# Patient Record
Sex: MALE | Race: WHITE | HISPANIC OR LATINO | Employment: FULL TIME | URBAN - METROPOLITAN AREA
[De-identification: names, ages, dates, MRNs, and addresses within clinical notes are randomized per-mention and may not be internally consistent; named-entity substitution may affect disease eponyms.]

---

## 2018-05-22 ENCOUNTER — OFFICE VISIT (OUTPATIENT)
Dept: URGENT CARE | Facility: CLINIC | Age: 53
End: 2018-05-22
Payer: COMMERCIAL

## 2018-05-22 VITALS
BODY MASS INDEX: 34.1 KG/M2 | SYSTOLIC BLOOD PRESSURE: 118 MMHG | RESPIRATION RATE: 16 BRPM | HEIGHT: 68 IN | TEMPERATURE: 98 F | DIASTOLIC BLOOD PRESSURE: 78 MMHG | HEART RATE: 76 BPM | WEIGHT: 225 LBS | OXYGEN SATURATION: 98 %

## 2018-05-22 DIAGNOSIS — L03.031 PARONYCHIA OF GREAT TOE OF RIGHT FOOT: ICD-10-CM

## 2018-05-22 DIAGNOSIS — L60.0 INGROWN RIGHT BIG TOENAIL: ICD-10-CM

## 2018-05-22 PROCEDURE — 99204 OFFICE O/P NEW MOD 45 MIN: CPT | Performed by: NURSE PRACTITIONER

## 2018-05-22 RX ORDER — CEPHALEXIN 500 MG/1
500 CAPSULE ORAL 4 TIMES DAILY
Qty: 28 CAP | Refills: 0 | Status: SHIPPED | OUTPATIENT
Start: 2018-05-22 | End: 2018-05-29

## 2018-05-22 ASSESSMENT — ENCOUNTER SYMPTOMS: FEVER: 0

## 2018-05-23 NOTE — PROGRESS NOTES
"Subjective:      Iain Costa is a 53 y.o. male who presents with Ingrown Toenail ((R) big toe x 1 week)            This is a new problem. Pt reports right great toe nail has been infected for over one week. + pain, swelling and drainage from great toe nail. He has been trying warm soaks and a homeopathic topical treatment without any improvement. He reports it hurts to stand on his foot all day and work. He denies fever. He attempted to get a pedicure last week but it was too painful.        Review of Systems   Constitutional: Negative for fever.   Musculoskeletal:        Right great toe infection   All other systems reviewed and are negative.    History reviewed. No pertinent past medical history. History reviewed. No pertinent surgical history.   Social History     Social History   • Marital status:      Spouse name: N/A   • Number of children: N/A   • Years of education: N/A     Occupational History   • Not on file.     Social History Main Topics   • Smoking status: Never Smoker   • Smokeless tobacco: Never Used   • Alcohol use No   • Drug use: No   • Sexual activity: Not on file     Other Topics Concern   • Not on file     Social History Narrative   • No narrative on file          Objective:     /78   Pulse 76   Temp 36.7 °C (98 °F)   Resp 16   Ht 1.727 m (5' 8\")   Wt 102.1 kg (225 lb)   SpO2 98%   BMI 34.21 kg/m²      Physical Exam   Constitutional: He is oriented to person, place, and time. Vital signs are normal. He appears well-developed and well-nourished.   HENT:   Head: Normocephalic and atraumatic.   Eyes: EOM are normal. Pupils are equal, round, and reactive to light.   Neck: Normal range of motion.   Cardiovascular: Normal rate and regular rhythm.    Pulmonary/Chest: Effort normal.   Musculoskeletal: Normal range of motion.        Right foot: There is tenderness and swelling.        Feet:    Neurological: He is alert and oriented to person, place, and time.   Skin: Skin is warm " and dry. Capillary refill takes less than 2 seconds.   Psychiatric: He has a normal mood and affect. His speech is normal and behavior is normal. Thought content normal.   Vitals reviewed.              Assessment/Plan:     1. Paronychia of great toe of right foot  - cephALEXin (KEFLEX) 500 MG Cap; Take 1 Cap by mouth 4 times a day for 7 days.  Dispense: 28 Cap; Refill: 0    2. Ingrown right big toenail  - cephALEXin (KEFLEX) 500 MG Cap; Take 1 Cap by mouth 4 times a day for 7 days.  Dispense: 28 Cap; Refill: 0    Advised pt and family that attempting to remove toenail today would prove to be very painful and likely unsuccessful due to the swelling and infection, they understand  Warm Epsom salt soaks TID  Alternate tylenol and ibuprofen PRN pain  When toe begins to heal and infection clears up, he is then instructed to have a pedicure to trim overgrown toe nail, he and his wife understand  Strict ER precautions for new onset fever, worsening pain and swelling  Supportive care, differential diagnoses, and indications for immediate follow-up discussed with patient.    Pathogenesis of diagnosis discussed including typical length and natural progression.      Instructed to return to  or nearest emergency department if symptoms fail to improve, for any change in condition, further concerns, or new concerning symptoms.  Patient states understanding of the plan of care and discharge instructions.

## 2020-01-01 ENCOUNTER — APPOINTMENT (OUTPATIENT)
Dept: RADIOLOGY | Facility: MEDICAL CENTER | Age: 55
DRG: 870 | End: 2020-01-01
Attending: INTERNAL MEDICINE
Payer: COMMERCIAL

## 2020-01-01 ENCOUNTER — OFFICE VISIT (OUTPATIENT)
Dept: URGENT CARE | Facility: CLINIC | Age: 55
End: 2020-01-01
Payer: COMMERCIAL

## 2020-01-01 ENCOUNTER — HOSPITAL ENCOUNTER (OUTPATIENT)
Facility: MEDICAL CENTER | Age: 55
End: 2020-11-06
Attending: FAMILY MEDICINE
Payer: COMMERCIAL

## 2020-01-01 ENCOUNTER — APPOINTMENT (OUTPATIENT)
Dept: RADIOLOGY | Facility: MEDICAL CENTER | Age: 55
DRG: 870 | End: 2020-01-01
Attending: EMERGENCY MEDICINE
Payer: COMMERCIAL

## 2020-01-01 ENCOUNTER — HOSPITAL ENCOUNTER (OUTPATIENT)
Dept: RADIOLOGY | Facility: MEDICAL CENTER | Age: 55
End: 2020-11-18
Attending: INTERNAL MEDICINE
Payer: COMMERCIAL

## 2020-01-01 ENCOUNTER — APPOINTMENT (OUTPATIENT)
Dept: RADIOLOGY | Facility: IMAGING CENTER | Age: 55
End: 2020-01-01
Attending: FAMILY MEDICINE
Payer: COMMERCIAL

## 2020-01-01 ENCOUNTER — HOSPITAL ENCOUNTER (INPATIENT)
Facility: MEDICAL CENTER | Age: 55
LOS: 21 days | DRG: 870 | End: 2020-12-03
Attending: EMERGENCY MEDICINE | Admitting: INTERNAL MEDICINE
Payer: COMMERCIAL

## 2020-01-01 VITALS
OXYGEN SATURATION: 93 % | TEMPERATURE: 98.8 F | HEIGHT: 68 IN | RESPIRATION RATE: 20 BRPM | DIASTOLIC BLOOD PRESSURE: 78 MMHG | BODY MASS INDEX: 34.56 KG/M2 | HEART RATE: 90 BPM | WEIGHT: 228 LBS | SYSTOLIC BLOOD PRESSURE: 120 MMHG

## 2020-01-01 VITALS
SYSTOLIC BLOOD PRESSURE: 82 MMHG | BODY MASS INDEX: 35.68 KG/M2 | WEIGHT: 235.45 LBS | DIASTOLIC BLOOD PRESSURE: 44 MMHG | HEIGHT: 68 IN | TEMPERATURE: 95.4 F

## 2020-01-01 DIAGNOSIS — U07.1 ACUTE RESPIRATORY DISTRESS SYNDROME (ARDS) DUE TO COVID-19 VIRUS (HCC): ICD-10-CM

## 2020-01-01 DIAGNOSIS — J18.9 MULTIFOCAL PNEUMONIA: ICD-10-CM

## 2020-01-01 DIAGNOSIS — R05.9 COUGH: ICD-10-CM

## 2020-01-01 DIAGNOSIS — U07.1 COVID-19 VIRUS INFECTION: ICD-10-CM

## 2020-01-01 DIAGNOSIS — R06.02 SHORTNESS OF BREATH: ICD-10-CM

## 2020-01-01 DIAGNOSIS — J96.01 ACUTE RESPIRATORY FAILURE WITH HYPOXIA (HCC): ICD-10-CM

## 2020-01-01 DIAGNOSIS — J80 ACUTE RESPIRATORY DISTRESS SYNDROME (ARDS) DUE TO COVID-19 VIRUS (HCC): ICD-10-CM

## 2020-01-01 DIAGNOSIS — M79.10 MYALGIA: ICD-10-CM

## 2020-01-01 LAB
ACTION RANGE TRIGGERED IACRT: NO
ACTION RANGE TRIGGERED IACRT: YES
ALBUMIN SERPL BCP-MCNC: 2.1 G/DL (ref 3.2–4.9)
ALBUMIN SERPL BCP-MCNC: 2.2 G/DL (ref 3.2–4.9)
ALBUMIN SERPL BCP-MCNC: 2.2 G/DL (ref 3.2–4.9)
ALBUMIN SERPL BCP-MCNC: 2.3 G/DL (ref 3.2–4.9)
ALBUMIN SERPL BCP-MCNC: 2.4 G/DL (ref 3.2–4.9)
ALBUMIN SERPL BCP-MCNC: 2.4 G/DL (ref 3.2–4.9)
ALBUMIN SERPL BCP-MCNC: 2.5 G/DL (ref 3.2–4.9)
ALBUMIN SERPL BCP-MCNC: 2.7 G/DL (ref 3.2–4.9)
ALBUMIN SERPL BCP-MCNC: 2.8 G/DL (ref 3.2–4.9)
ALBUMIN SERPL BCP-MCNC: 3 G/DL (ref 3.2–4.9)
ALBUMIN SERPL BCP-MCNC: 3.8 G/DL (ref 3.2–4.9)
ALBUMIN/GLOB SERPL: 0.6 G/DL
ALBUMIN/GLOB SERPL: 0.7 G/DL
ALBUMIN/GLOB SERPL: 0.8 G/DL
ALBUMIN/GLOB SERPL: 1.2 G/DL
ALP SERPL-CCNC: 56 U/L (ref 30–99)
ALP SERPL-CCNC: 60 U/L (ref 30–99)
ALP SERPL-CCNC: 61 U/L (ref 30–99)
ALP SERPL-CCNC: 61 U/L (ref 30–99)
ALP SERPL-CCNC: 64 U/L (ref 30–99)
ALP SERPL-CCNC: 65 U/L (ref 30–99)
ALP SERPL-CCNC: 70 U/L (ref 30–99)
ALP SERPL-CCNC: 72 U/L (ref 30–99)
ALP SERPL-CCNC: 74 U/L (ref 30–99)
ALP SERPL-CCNC: 77 U/L (ref 30–99)
ALP SERPL-CCNC: 80 U/L (ref 30–99)
ALP SERPL-CCNC: 86 U/L (ref 30–99)
ALP SERPL-CCNC: 98 U/L (ref 30–99)
ALT SERPL-CCNC: 102 U/L (ref 2–50)
ALT SERPL-CCNC: 38 U/L (ref 2–50)
ALT SERPL-CCNC: 40 U/L (ref 2–50)
ALT SERPL-CCNC: 46 U/L (ref 2–50)
ALT SERPL-CCNC: 52 U/L (ref 2–50)
ALT SERPL-CCNC: 53 U/L (ref 2–50)
ALT SERPL-CCNC: 55 U/L (ref 2–50)
ALT SERPL-CCNC: 65 U/L (ref 2–50)
ALT SERPL-CCNC: 65 U/L (ref 2–50)
ALT SERPL-CCNC: 71 U/L (ref 2–50)
ALT SERPL-CCNC: 78 U/L (ref 2–50)
ALT SERPL-CCNC: 81 U/L (ref 2–50)
ALT SERPL-CCNC: 88 U/L (ref 2–50)
ANION GAP SERPL CALC-SCNC: 10 MMOL/L (ref 7–16)
ANION GAP SERPL CALC-SCNC: 11 MMOL/L (ref 7–16)
ANION GAP SERPL CALC-SCNC: 11 MMOL/L (ref 7–16)
ANION GAP SERPL CALC-SCNC: 12 MMOL/L (ref 7–16)
ANION GAP SERPL CALC-SCNC: 16 MMOL/L (ref 7–16)
ANION GAP SERPL CALC-SCNC: 2 MMOL/L (ref 7–16)
ANION GAP SERPL CALC-SCNC: 3 MMOL/L (ref 7–16)
ANION GAP SERPL CALC-SCNC: 3 MMOL/L (ref 7–16)
ANION GAP SERPL CALC-SCNC: 4 MMOL/L (ref 7–16)
ANION GAP SERPL CALC-SCNC: 4 MMOL/L (ref 7–16)
ANION GAP SERPL CALC-SCNC: 5 MMOL/L (ref 7–16)
ANION GAP SERPL CALC-SCNC: 6 MMOL/L (ref 7–16)
ANION GAP SERPL CALC-SCNC: 7 MMOL/L (ref 7–16)
ANION GAP SERPL CALC-SCNC: 8 MMOL/L (ref 7–16)
ANISOCYTOSIS BLD QL SMEAR: ABNORMAL
ANISOCYTOSIS BLD QL SMEAR: ABNORMAL
APPEARANCE UR: CLEAR
APPEARANCE UR: CLEAR
AST SERPL-CCNC: 35 U/L (ref 12–45)
AST SERPL-CCNC: 36 U/L (ref 12–45)
AST SERPL-CCNC: 38 U/L (ref 12–45)
AST SERPL-CCNC: 44 U/L (ref 12–45)
AST SERPL-CCNC: 46 U/L (ref 12–45)
AST SERPL-CCNC: 52 U/L (ref 12–45)
AST SERPL-CCNC: 55 U/L (ref 12–45)
AST SERPL-CCNC: 61 U/L (ref 12–45)
AST SERPL-CCNC: 65 U/L (ref 12–45)
AST SERPL-CCNC: 72 U/L (ref 12–45)
AST SERPL-CCNC: 85 U/L (ref 12–45)
BACTERIA #/AREA URNS HPF: ABNORMAL /HPF
BACTERIA #/AREA URNS HPF: NEGATIVE /HPF
BACTERIA BLD CULT: ABNORMAL
BACTERIA BLD CULT: ABNORMAL
BACTERIA BLD CULT: NORMAL
BACTERIA UR CULT: NORMAL
BASE EXCESS BLDA CALC-SCNC: -3 MMOL/L (ref -4–3)
BASE EXCESS BLDA CALC-SCNC: -3 MMOL/L (ref -4–3)
BASE EXCESS BLDA CALC-SCNC: -4 MMOL/L (ref -4–3)
BASE EXCESS BLDA CALC-SCNC: 1 MMOL/L (ref -4–3)
BASE EXCESS BLDA CALC-SCNC: 10 MMOL/L (ref -4–3)
BASE EXCESS BLDA CALC-SCNC: 10 MMOL/L (ref -4–3)
BASE EXCESS BLDA CALC-SCNC: 11 MMOL/L (ref -4–3)
BASE EXCESS BLDA CALC-SCNC: 12 MMOL/L (ref -4–3)
BASE EXCESS BLDA CALC-SCNC: 12 MMOL/L (ref -4–3)
BASE EXCESS BLDA CALC-SCNC: 13 MMOL/L (ref -4–3)
BASE EXCESS BLDA CALC-SCNC: 13 MMOL/L (ref -4–3)
BASE EXCESS BLDA CALC-SCNC: 4 MMOL/L (ref -4–3)
BASE EXCESS BLDA CALC-SCNC: 5 MMOL/L (ref -4–3)
BASE EXCESS BLDA CALC-SCNC: 7 MMOL/L (ref -4–3)
BASE EXCESS BLDA CALC-SCNC: 8 MMOL/L (ref -4–3)
BASE EXCESS BLDA CALC-SCNC: 9 MMOL/L (ref -4–3)
BASO STIPL BLD QL SMEAR: NORMAL
BASOPHILS # BLD AUTO: 0 % (ref 0–1.8)
BASOPHILS # BLD AUTO: 0.1 % (ref 0–1.8)
BASOPHILS # BLD AUTO: 0.2 % (ref 0–1.8)
BASOPHILS # BLD AUTO: 0.3 % (ref 0–1.8)
BASOPHILS # BLD AUTO: 0.3 % (ref 0–1.8)
BASOPHILS # BLD AUTO: 0.4 % (ref 0–1.8)
BASOPHILS # BLD AUTO: 0.5 % (ref 0–1.8)
BASOPHILS # BLD AUTO: 0.9 % (ref 0–1.8)
BASOPHILS # BLD: 0 K/UL (ref 0–0.12)
BASOPHILS # BLD: 0.01 K/UL (ref 0–0.12)
BASOPHILS # BLD: 0.02 K/UL (ref 0–0.12)
BASOPHILS # BLD: 0.03 K/UL (ref 0–0.12)
BASOPHILS # BLD: 0.03 K/UL (ref 0–0.12)
BASOPHILS # BLD: 0.04 K/UL (ref 0–0.12)
BASOPHILS # BLD: 0.05 K/UL (ref 0–0.12)
BASOPHILS # BLD: 0.06 K/UL (ref 0–0.12)
BASOPHILS # BLD: 0.06 K/UL (ref 0–0.12)
BASOPHILS # BLD: 0.09 K/UL (ref 0–0.12)
BILIRUB SERPL-MCNC: 0.4 MG/DL (ref 0.1–1.5)
BILIRUB SERPL-MCNC: 0.5 MG/DL (ref 0.1–1.5)
BILIRUB SERPL-MCNC: 0.5 MG/DL (ref 0.1–1.5)
BILIRUB SERPL-MCNC: 0.6 MG/DL (ref 0.1–1.5)
BILIRUB SERPL-MCNC: 0.7 MG/DL (ref 0.1–1.5)
BILIRUB SERPL-MCNC: 0.8 MG/DL (ref 0.1–1.5)
BILIRUB SERPL-MCNC: 0.8 MG/DL (ref 0.1–1.5)
BILIRUB SERPL-MCNC: 0.9 MG/DL (ref 0.1–1.5)
BILIRUB UR QL STRIP.AUTO: NEGATIVE
BILIRUB UR QL STRIP.AUTO: NEGATIVE
BODY TEMPERATURE: ABNORMAL DEGREES
BUN SERPL-MCNC: 12 MG/DL (ref 8–22)
BUN SERPL-MCNC: 12 MG/DL (ref 8–22)
BUN SERPL-MCNC: 16 MG/DL (ref 8–22)
BUN SERPL-MCNC: 17 MG/DL (ref 8–22)
BUN SERPL-MCNC: 18 MG/DL (ref 8–22)
BUN SERPL-MCNC: 19 MG/DL (ref 8–22)
BUN SERPL-MCNC: 21 MG/DL (ref 8–22)
BUN SERPL-MCNC: 21 MG/DL (ref 8–22)
BUN SERPL-MCNC: 22 MG/DL (ref 8–22)
BUN SERPL-MCNC: 22 MG/DL (ref 8–22)
BUN SERPL-MCNC: 23 MG/DL (ref 8–22)
BUN SERPL-MCNC: 24 MG/DL (ref 8–22)
BUN SERPL-MCNC: 25 MG/DL (ref 8–22)
BUN SERPL-MCNC: 26 MG/DL (ref 8–22)
BUN SERPL-MCNC: 29 MG/DL (ref 8–22)
BUN SERPL-MCNC: 31 MG/DL (ref 8–22)
CALCIUM SERPL-MCNC: 7.9 MG/DL (ref 8.5–10.5)
CALCIUM SERPL-MCNC: 8 MG/DL (ref 8.5–10.5)
CALCIUM SERPL-MCNC: 8.1 MG/DL (ref 8.5–10.5)
CALCIUM SERPL-MCNC: 8.2 MG/DL (ref 8.5–10.5)
CALCIUM SERPL-MCNC: 8.3 MG/DL (ref 8.5–10.5)
CALCIUM SERPL-MCNC: 8.3 MG/DL (ref 8.5–10.5)
CALCIUM SERPL-MCNC: 8.4 MG/DL (ref 8.5–10.5)
CALCIUM SERPL-MCNC: 8.5 MG/DL (ref 8.5–10.5)
CALCIUM SERPL-MCNC: 8.6 MG/DL (ref 8.5–10.5)
CALCIUM SERPL-MCNC: 8.7 MG/DL (ref 8.5–10.5)
CALCIUM SERPL-MCNC: 8.9 MG/DL (ref 8.5–10.5)
CALCIUM SERPL-MCNC: 9 MG/DL (ref 8.5–10.5)
CALCIUM SERPL-MCNC: 9.1 MG/DL (ref 8.5–10.5)
CALCIUM SERPL-MCNC: 9.1 MG/DL (ref 8.5–10.5)
CHLORIDE SERPL-SCNC: 100 MMOL/L (ref 96–112)
CHLORIDE SERPL-SCNC: 100 MMOL/L (ref 96–112)
CHLORIDE SERPL-SCNC: 101 MMOL/L (ref 96–112)
CHLORIDE SERPL-SCNC: 105 MMOL/L (ref 96–112)
CHLORIDE SERPL-SCNC: 89 MMOL/L (ref 96–112)
CHLORIDE SERPL-SCNC: 90 MMOL/L (ref 96–112)
CHLORIDE SERPL-SCNC: 91 MMOL/L (ref 96–112)
CHLORIDE SERPL-SCNC: 92 MMOL/L (ref 96–112)
CHLORIDE SERPL-SCNC: 93 MMOL/L (ref 96–112)
CHLORIDE SERPL-SCNC: 94 MMOL/L (ref 96–112)
CHLORIDE SERPL-SCNC: 95 MMOL/L (ref 96–112)
CHLORIDE SERPL-SCNC: 96 MMOL/L (ref 96–112)
CHLORIDE SERPL-SCNC: 96 MMOL/L (ref 96–112)
CHLORIDE SERPL-SCNC: 97 MMOL/L (ref 96–112)
CHLORIDE SERPL-SCNC: 99 MMOL/L (ref 96–112)
CO2 BLDA-SCNC: 23 MMOL/L (ref 20–33)
CO2 BLDA-SCNC: 28 MMOL/L (ref 20–33)
CO2 BLDA-SCNC: 30 MMOL/L (ref 20–33)
CO2 BLDA-SCNC: 32 MMOL/L (ref 20–33)
CO2 BLDA-SCNC: 32 MMOL/L (ref 20–33)
CO2 BLDA-SCNC: 34 MMOL/L (ref 20–33)
CO2 BLDA-SCNC: 35 MMOL/L (ref 20–33)
CO2 BLDA-SCNC: 35 MMOL/L (ref 20–33)
CO2 BLDA-SCNC: 36 MMOL/L (ref 20–33)
CO2 BLDA-SCNC: 36 MMOL/L (ref 20–33)
CO2 BLDA-SCNC: 37 MMOL/L (ref 20–33)
CO2 BLDA-SCNC: 37 MMOL/L (ref 20–33)
CO2 BLDA-SCNC: 38 MMOL/L (ref 20–33)
CO2 BLDA-SCNC: 39 MMOL/L (ref 20–33)
CO2 BLDA-SCNC: 40 MMOL/L (ref 20–33)
CO2 BLDA-SCNC: 41 MMOL/L (ref 20–33)
CO2 BLDA-SCNC: 42 MMOL/L (ref 20–33)
CO2 BLDA-SCNC: 43 MMOL/L (ref 20–33)
CO2 BLDA-SCNC: 43 MMOL/L (ref 20–33)
CO2 BLDA-SCNC: 45 MMOL/L (ref 20–33)
CO2 SERPL-SCNC: 20 MMOL/L (ref 20–33)
CO2 SERPL-SCNC: 21 MMOL/L (ref 20–33)
CO2 SERPL-SCNC: 26 MMOL/L (ref 20–33)
CO2 SERPL-SCNC: 26 MMOL/L (ref 20–33)
CO2 SERPL-SCNC: 29 MMOL/L (ref 20–33)
CO2 SERPL-SCNC: 30 MMOL/L (ref 20–33)
CO2 SERPL-SCNC: 30 MMOL/L (ref 20–33)
CO2 SERPL-SCNC: 33 MMOL/L (ref 20–33)
CO2 SERPL-SCNC: 34 MMOL/L (ref 20–33)
CO2 SERPL-SCNC: 35 MMOL/L (ref 20–33)
CO2 SERPL-SCNC: 36 MMOL/L (ref 20–33)
CO2 SERPL-SCNC: 36 MMOL/L (ref 20–33)
CO2 SERPL-SCNC: 37 MMOL/L (ref 20–33)
CO2 SERPL-SCNC: 37 MMOL/L (ref 20–33)
CO2 SERPL-SCNC: 38 MMOL/L (ref 20–33)
CO2 SERPL-SCNC: 39 MMOL/L (ref 20–33)
CO2 SERPL-SCNC: 39 MMOL/L (ref 20–33)
COLOR UR: ABNORMAL
COLOR UR: YELLOW
CORTIS SERPL-MCNC: 27.5 UG/DL (ref 0–23)
COVID ORDER STATUS COVID19: NORMAL
CREAT SERPL-MCNC: 0.21 MG/DL (ref 0.5–1.4)
CREAT SERPL-MCNC: 0.28 MG/DL (ref 0.5–1.4)
CREAT SERPL-MCNC: 0.29 MG/DL (ref 0.5–1.4)
CREAT SERPL-MCNC: 0.32 MG/DL (ref 0.5–1.4)
CREAT SERPL-MCNC: 0.34 MG/DL (ref 0.5–1.4)
CREAT SERPL-MCNC: 0.35 MG/DL (ref 0.5–1.4)
CREAT SERPL-MCNC: 0.37 MG/DL (ref 0.5–1.4)
CREAT SERPL-MCNC: 0.39 MG/DL (ref 0.5–1.4)
CREAT SERPL-MCNC: 0.41 MG/DL (ref 0.5–1.4)
CREAT SERPL-MCNC: 0.43 MG/DL (ref 0.5–1.4)
CREAT SERPL-MCNC: 0.44 MG/DL (ref 0.5–1.4)
CREAT SERPL-MCNC: 0.44 MG/DL (ref 0.5–1.4)
CREAT SERPL-MCNC: 0.49 MG/DL (ref 0.5–1.4)
CREAT SERPL-MCNC: 0.52 MG/DL (ref 0.5–1.4)
CREAT SERPL-MCNC: 0.54 MG/DL (ref 0.5–1.4)
CREAT SERPL-MCNC: 0.57 MG/DL (ref 0.5–1.4)
CREAT SERPL-MCNC: 0.67 MG/DL (ref 0.5–1.4)
CREAT SERPL-MCNC: 0.67 MG/DL (ref 0.5–1.4)
CREAT SERPL-MCNC: 0.69 MG/DL (ref 0.5–1.4)
CREAT SERPL-MCNC: 0.74 MG/DL (ref 0.5–1.4)
CREAT SERPL-MCNC: 0.79 MG/DL (ref 0.5–1.4)
CREAT SERPL-MCNC: 0.83 MG/DL (ref 0.5–1.4)
CRP SERPL HS-MCNC: 17.02 MG/DL (ref 0–0.75)
CRP SERPL HS-MCNC: 25.08 MG/DL (ref 0–0.75)
CRP SERPL HS-MCNC: 29.76 MG/DL (ref 0–0.75)
CRP SERPL HS-MCNC: 29.92 MG/DL (ref 0–0.75)
CRP SERPL HS-MCNC: 32.77 MG/DL (ref 0–0.75)
CRP SERPL HS-MCNC: 8.42 MG/DL (ref 0–0.75)
D DIMER PPP IA.FEU-MCNC: 0.32 UG/ML (FEU) (ref 0–0.5)
D DIMER PPP IA.FEU-MCNC: 1.7 UG/ML (FEU) (ref 0–0.5)
EKG IMPRESSION: NORMAL
EOSINOPHIL # BLD AUTO: 0 K/UL (ref 0–0.51)
EOSINOPHIL # BLD AUTO: 0.01 K/UL (ref 0–0.51)
EOSINOPHIL # BLD AUTO: 0.03 K/UL (ref 0–0.51)
EOSINOPHIL # BLD AUTO: 0.08 K/UL (ref 0–0.51)
EOSINOPHIL # BLD AUTO: 0.09 K/UL (ref 0–0.51)
EOSINOPHIL # BLD AUTO: 0.1 K/UL (ref 0–0.51)
EOSINOPHIL # BLD AUTO: 0.17 K/UL (ref 0–0.51)
EOSINOPHIL # BLD AUTO: 0.17 K/UL (ref 0–0.51)
EOSINOPHIL # BLD AUTO: 0.22 K/UL (ref 0–0.51)
EOSINOPHIL # BLD AUTO: 0.28 K/UL (ref 0–0.51)
EOSINOPHIL # BLD AUTO: 0.29 K/UL (ref 0–0.51)
EOSINOPHIL # BLD AUTO: 0.3 K/UL (ref 0–0.51)
EOSINOPHIL # BLD AUTO: 0.3 K/UL (ref 0–0.51)
EOSINOPHIL # BLD AUTO: 0.46 K/UL (ref 0–0.51)
EOSINOPHIL # BLD AUTO: 0.6 K/UL (ref 0–0.51)
EOSINOPHIL # BLD AUTO: 0.65 K/UL (ref 0–0.51)
EOSINOPHIL # BLD AUTO: 0.83 K/UL (ref 0–0.51)
EOSINOPHIL # BLD AUTO: 1.44 K/UL (ref 0–0.51)
EOSINOPHIL NFR BLD: 0 % (ref 0–6.9)
EOSINOPHIL NFR BLD: 0.1 % (ref 0–6.9)
EOSINOPHIL NFR BLD: 0.2 % (ref 0–6.9)
EOSINOPHIL NFR BLD: 0.4 % (ref 0–6.9)
EOSINOPHIL NFR BLD: 0.8 % (ref 0–6.9)
EOSINOPHIL NFR BLD: 0.9 % (ref 0–6.9)
EOSINOPHIL NFR BLD: 0.9 % (ref 0–6.9)
EOSINOPHIL NFR BLD: 1 % (ref 0–6.9)
EOSINOPHIL NFR BLD: 1 % (ref 0–6.9)
EOSINOPHIL NFR BLD: 1.7 % (ref 0–6.9)
EOSINOPHIL NFR BLD: 14 % (ref 0–6.9)
EOSINOPHIL NFR BLD: 2.5 % (ref 0–6.9)
EOSINOPHIL NFR BLD: 2.5 % (ref 0–6.9)
EOSINOPHIL NFR BLD: 3 % (ref 0–6.9)
EOSINOPHIL NFR BLD: 4.5 % (ref 0–6.9)
EOSINOPHIL NFR BLD: 5.1 % (ref 0–6.9)
EOSINOPHIL NFR BLD: 5.3 % (ref 0–6.9)
EOSINOPHIL NFR BLD: 7.7 % (ref 0–6.9)
EPI CELLS #/AREA URNS HPF: NEGATIVE /HPF
EPI CELLS #/AREA URNS HPF: NEGATIVE /HPF
ERYTHROCYTE [DISTWIDTH] IN BLOOD BY AUTOMATED COUNT: 40.4 FL (ref 35.9–50)
ERYTHROCYTE [DISTWIDTH] IN BLOOD BY AUTOMATED COUNT: 41 FL (ref 35.9–50)
ERYTHROCYTE [DISTWIDTH] IN BLOOD BY AUTOMATED COUNT: 41.1 FL (ref 35.9–50)
ERYTHROCYTE [DISTWIDTH] IN BLOOD BY AUTOMATED COUNT: 41.5 FL (ref 35.9–50)
ERYTHROCYTE [DISTWIDTH] IN BLOOD BY AUTOMATED COUNT: 42.7 FL (ref 35.9–50)
ERYTHROCYTE [DISTWIDTH] IN BLOOD BY AUTOMATED COUNT: 42.8 FL (ref 35.9–50)
ERYTHROCYTE [DISTWIDTH] IN BLOOD BY AUTOMATED COUNT: 42.8 FL (ref 35.9–50)
ERYTHROCYTE [DISTWIDTH] IN BLOOD BY AUTOMATED COUNT: 42.9 FL (ref 35.9–50)
ERYTHROCYTE [DISTWIDTH] IN BLOOD BY AUTOMATED COUNT: 42.9 FL (ref 35.9–50)
ERYTHROCYTE [DISTWIDTH] IN BLOOD BY AUTOMATED COUNT: 43.2 FL (ref 35.9–50)
ERYTHROCYTE [DISTWIDTH] IN BLOOD BY AUTOMATED COUNT: 43.5 FL (ref 35.9–50)
ERYTHROCYTE [DISTWIDTH] IN BLOOD BY AUTOMATED COUNT: 43.7 FL (ref 35.9–50)
ERYTHROCYTE [DISTWIDTH] IN BLOOD BY AUTOMATED COUNT: 44 FL (ref 35.9–50)
ERYTHROCYTE [DISTWIDTH] IN BLOOD BY AUTOMATED COUNT: 44.9 FL (ref 35.9–50)
ERYTHROCYTE [DISTWIDTH] IN BLOOD BY AUTOMATED COUNT: 45.1 FL (ref 35.9–50)
ERYTHROCYTE [DISTWIDTH] IN BLOOD BY AUTOMATED COUNT: 45.1 FL (ref 35.9–50)
ERYTHROCYTE [DISTWIDTH] IN BLOOD BY AUTOMATED COUNT: 45.7 FL (ref 35.9–50)
ERYTHROCYTE [DISTWIDTH] IN BLOOD BY AUTOMATED COUNT: 47.9 FL (ref 35.9–50)
ERYTHROCYTE [DISTWIDTH] IN BLOOD BY AUTOMATED COUNT: 48.1 FL (ref 35.9–50)
ERYTHROCYTE [DISTWIDTH] IN BLOOD BY AUTOMATED COUNT: 48.2 FL (ref 35.9–50)
ERYTHROCYTE [DISTWIDTH] IN BLOOD BY AUTOMATED COUNT: 49.2 FL (ref 35.9–50)
ERYTHROCYTE [DISTWIDTH] IN BLOOD BY AUTOMATED COUNT: 50.1 FL (ref 35.9–50)
GLOBULIN SER CALC-MCNC: 3.3 G/DL (ref 1.9–3.5)
GLOBULIN SER CALC-MCNC: 3.5 G/DL (ref 1.9–3.5)
GLOBULIN SER CALC-MCNC: 3.6 G/DL (ref 1.9–3.5)
GLOBULIN SER CALC-MCNC: 3.6 G/DL (ref 1.9–3.5)
GLOBULIN SER CALC-MCNC: 3.7 G/DL (ref 1.9–3.5)
GLOBULIN SER CALC-MCNC: 3.8 G/DL (ref 1.9–3.5)
GLOBULIN SER CALC-MCNC: 4 G/DL (ref 1.9–3.5)
GLUCOSE BLD-MCNC: 111 MG/DL (ref 65–99)
GLUCOSE BLD-MCNC: 114 MG/DL (ref 65–99)
GLUCOSE BLD-MCNC: 120 MG/DL (ref 65–99)
GLUCOSE BLD-MCNC: 122 MG/DL (ref 65–99)
GLUCOSE BLD-MCNC: 124 MG/DL (ref 65–99)
GLUCOSE BLD-MCNC: 125 MG/DL (ref 65–99)
GLUCOSE BLD-MCNC: 126 MG/DL (ref 65–99)
GLUCOSE BLD-MCNC: 127 MG/DL (ref 65–99)
GLUCOSE BLD-MCNC: 129 MG/DL (ref 65–99)
GLUCOSE BLD-MCNC: 132 MG/DL (ref 65–99)
GLUCOSE BLD-MCNC: 133 MG/DL (ref 65–99)
GLUCOSE BLD-MCNC: 135 MG/DL (ref 65–99)
GLUCOSE BLD-MCNC: 144 MG/DL (ref 65–99)
GLUCOSE BLD-MCNC: 146 MG/DL (ref 65–99)
GLUCOSE BLD-MCNC: 147 MG/DL (ref 65–99)
GLUCOSE BLD-MCNC: 148 MG/DL (ref 65–99)
GLUCOSE BLD-MCNC: 148 MG/DL (ref 65–99)
GLUCOSE BLD-MCNC: 150 MG/DL (ref 65–99)
GLUCOSE BLD-MCNC: 150 MG/DL (ref 65–99)
GLUCOSE BLD-MCNC: 151 MG/DL (ref 65–99)
GLUCOSE BLD-MCNC: 155 MG/DL (ref 65–99)
GLUCOSE BLD-MCNC: 156 MG/DL (ref 65–99)
GLUCOSE BLD-MCNC: 156 MG/DL (ref 65–99)
GLUCOSE BLD-MCNC: 167 MG/DL (ref 65–99)
GLUCOSE BLD-MCNC: 171 MG/DL (ref 65–99)
GLUCOSE BLD-MCNC: 173 MG/DL (ref 65–99)
GLUCOSE BLD-MCNC: 177 MG/DL (ref 65–99)
GLUCOSE BLD-MCNC: 178 MG/DL (ref 65–99)
GLUCOSE BLD-MCNC: 180 MG/DL (ref 65–99)
GLUCOSE BLD-MCNC: 183 MG/DL (ref 65–99)
GLUCOSE BLD-MCNC: 188 MG/DL (ref 65–99)
GLUCOSE BLD-MCNC: 190 MG/DL (ref 65–99)
GLUCOSE BLD-MCNC: 195 MG/DL (ref 65–99)
GLUCOSE BLD-MCNC: 200 MG/DL (ref 65–99)
GLUCOSE SERPL-MCNC: 111 MG/DL (ref 65–99)
GLUCOSE SERPL-MCNC: 112 MG/DL (ref 65–99)
GLUCOSE SERPL-MCNC: 112 MG/DL (ref 65–99)
GLUCOSE SERPL-MCNC: 115 MG/DL (ref 65–99)
GLUCOSE SERPL-MCNC: 116 MG/DL (ref 65–99)
GLUCOSE SERPL-MCNC: 117 MG/DL (ref 65–99)
GLUCOSE SERPL-MCNC: 129 MG/DL (ref 65–99)
GLUCOSE SERPL-MCNC: 130 MG/DL (ref 65–99)
GLUCOSE SERPL-MCNC: 132 MG/DL (ref 65–99)
GLUCOSE SERPL-MCNC: 136 MG/DL (ref 65–99)
GLUCOSE SERPL-MCNC: 138 MG/DL (ref 65–99)
GLUCOSE SERPL-MCNC: 139 MG/DL (ref 65–99)
GLUCOSE SERPL-MCNC: 140 MG/DL (ref 65–99)
GLUCOSE SERPL-MCNC: 142 MG/DL (ref 65–99)
GLUCOSE SERPL-MCNC: 143 MG/DL (ref 65–99)
GLUCOSE SERPL-MCNC: 146 MG/DL (ref 65–99)
GLUCOSE SERPL-MCNC: 148 MG/DL (ref 65–99)
GLUCOSE SERPL-MCNC: 152 MG/DL (ref 65–99)
GLUCOSE SERPL-MCNC: 165 MG/DL (ref 65–99)
GLUCOSE SERPL-MCNC: 171 MG/DL (ref 65–99)
GLUCOSE SERPL-MCNC: 175 MG/DL (ref 65–99)
GLUCOSE SERPL-MCNC: 93 MG/DL (ref 65–99)
GLUCOSE UR STRIP.AUTO-MCNC: NEGATIVE MG/DL
GLUCOSE UR STRIP.AUTO-MCNC: NEGATIVE MG/DL
HCO3 BLDA-SCNC: 22.1 MMOL/L (ref 17–25)
HCO3 BLDA-SCNC: 25.9 MMOL/L (ref 17–25)
HCO3 BLDA-SCNC: 27.8 MMOL/L (ref 17–25)
HCO3 BLDA-SCNC: 29.7 MMOL/L (ref 17–25)
HCO3 BLDA-SCNC: 31.3 MMOL/L (ref 17–25)
HCO3 BLDA-SCNC: 32.5 MMOL/L (ref 17–25)
HCO3 BLDA-SCNC: 33.5 MMOL/L (ref 17–25)
HCO3 BLDA-SCNC: 33.8 MMOL/L (ref 17–25)
HCO3 BLDA-SCNC: 34.3 MMOL/L (ref 17–25)
HCO3 BLDA-SCNC: 34.6 MMOL/L (ref 17–25)
HCO3 BLDA-SCNC: 34.6 MMOL/L (ref 17–25)
HCO3 BLDA-SCNC: 35.4 MMOL/L (ref 17–25)
HCO3 BLDA-SCNC: 36 MMOL/L (ref 17–25)
HCO3 BLDA-SCNC: 36.4 MMOL/L (ref 17–25)
HCO3 BLDA-SCNC: 36.5 MMOL/L (ref 17–25)
HCO3 BLDA-SCNC: 36.5 MMOL/L (ref 17–25)
HCO3 BLDA-SCNC: 36.7 MMOL/L (ref 17–25)
HCO3 BLDA-SCNC: 37.9 MMOL/L (ref 17–25)
HCO3 BLDA-SCNC: 38.1 MMOL/L (ref 17–25)
HCO3 BLDA-SCNC: 38.3 MMOL/L (ref 17–25)
HCO3 BLDA-SCNC: 38.7 MMOL/L (ref 17–25)
HCO3 BLDA-SCNC: 39.2 MMOL/L (ref 17–25)
HCO3 BLDA-SCNC: 39.5 MMOL/L (ref 17–25)
HCO3 BLDA-SCNC: 40 MMOL/L (ref 17–25)
HCO3 BLDA-SCNC: 40.7 MMOL/L (ref 17–25)
HCO3 BLDA-SCNC: 41.4 MMOL/L (ref 17–25)
HCT VFR BLD AUTO: 33.4 % (ref 42–52)
HCT VFR BLD AUTO: 34.1 % (ref 42–52)
HCT VFR BLD AUTO: 34.1 % (ref 42–52)
HCT VFR BLD AUTO: 34.9 % (ref 42–52)
HCT VFR BLD AUTO: 35.2 % (ref 42–52)
HCT VFR BLD AUTO: 36.4 % (ref 42–52)
HCT VFR BLD AUTO: 36.9 % (ref 42–52)
HCT VFR BLD AUTO: 38.4 % (ref 42–52)
HCT VFR BLD AUTO: 38.4 % (ref 42–52)
HCT VFR BLD AUTO: 38.6 % (ref 42–52)
HCT VFR BLD AUTO: 39.7 % (ref 42–52)
HCT VFR BLD AUTO: 39.8 % (ref 42–52)
HCT VFR BLD AUTO: 40 % (ref 42–52)
HCT VFR BLD AUTO: 41.8 % (ref 42–52)
HCT VFR BLD AUTO: 42.6 % (ref 42–52)
HCT VFR BLD AUTO: 43.9 % (ref 42–52)
HCT VFR BLD AUTO: 44.5 % (ref 42–52)
HCT VFR BLD AUTO: 45.9 % (ref 42–52)
HCT VFR BLD AUTO: 46.1 % (ref 42–52)
HCT VFR BLD AUTO: 47.7 % (ref 42–52)
HCT VFR BLD AUTO: 48.1 % (ref 42–52)
HCT VFR BLD AUTO: 48.2 % (ref 42–52)
HGB BLD-MCNC: 10.3 G/DL (ref 14–18)
HGB BLD-MCNC: 10.5 G/DL (ref 14–18)
HGB BLD-MCNC: 10.6 G/DL (ref 14–18)
HGB BLD-MCNC: 10.7 G/DL (ref 14–18)
HGB BLD-MCNC: 10.9 G/DL (ref 14–18)
HGB BLD-MCNC: 11.3 G/DL (ref 14–18)
HGB BLD-MCNC: 11.4 G/DL (ref 14–18)
HGB BLD-MCNC: 12.5 G/DL (ref 14–18)
HGB BLD-MCNC: 12.5 G/DL (ref 14–18)
HGB BLD-MCNC: 12.6 G/DL (ref 14–18)
HGB BLD-MCNC: 13 G/DL (ref 14–18)
HGB BLD-MCNC: 13.1 G/DL (ref 14–18)
HGB BLD-MCNC: 14 G/DL (ref 14–18)
HGB BLD-MCNC: 14.8 G/DL (ref 14–18)
HGB BLD-MCNC: 14.9 G/DL (ref 14–18)
HGB BLD-MCNC: 15 G/DL (ref 14–18)
HGB BLD-MCNC: 15 G/DL (ref 14–18)
HGB BLD-MCNC: 15.4 G/DL (ref 14–18)
HGB BLD-MCNC: 15.8 G/DL (ref 14–18)
HGB BLD-MCNC: 15.9 G/DL (ref 14–18)
HGB BLD-MCNC: NORMAL G/DL (ref 14–18)
HOROWITZ INDEX BLDA+IHG-RTO: 110 MM[HG]
HOROWITZ INDEX BLDA+IHG-RTO: 111 MM[HG]
HOROWITZ INDEX BLDA+IHG-RTO: 137 MM[HG]
HOROWITZ INDEX BLDA+IHG-RTO: 153 MM[HG]
HOROWITZ INDEX BLDA+IHG-RTO: 178 MM[HG]
HOROWITZ INDEX BLDA+IHG-RTO: 180 MM[HG]
HOROWITZ INDEX BLDA+IHG-RTO: 210 MM[HG]
HOROWITZ INDEX BLDA+IHG-RTO: 35 MM[HG]
HOROWITZ INDEX BLDA+IHG-RTO: 44 MM[HG]
HOROWITZ INDEX BLDA+IHG-RTO: 45 MM[HG]
HOROWITZ INDEX BLDA+IHG-RTO: 47 MM[HG]
HOROWITZ INDEX BLDA+IHG-RTO: 53 MM[HG]
HOROWITZ INDEX BLDA+IHG-RTO: 56 MM[HG]
HOROWITZ INDEX BLDA+IHG-RTO: 59 MM[HG]
HOROWITZ INDEX BLDA+IHG-RTO: 59 MM[HG]
HOROWITZ INDEX BLDA+IHG-RTO: 68 MM[HG]
HOROWITZ INDEX BLDA+IHG-RTO: 70 MM[HG]
HOROWITZ INDEX BLDA+IHG-RTO: 75 MM[HG]
HOROWITZ INDEX BLDA+IHG-RTO: 78 MM[HG]
HOROWITZ INDEX BLDA+IHG-RTO: 80 MM[HG]
HOROWITZ INDEX BLDA+IHG-RTO: 84 MM[HG]
HOROWITZ INDEX BLDA+IHG-RTO: 86 MM[HG]
HOROWITZ INDEX BLDA+IHG-RTO: 87 MM[HG]
HOROWITZ INDEX BLDA+IHG-RTO: 90 MM[HG]
HOROWITZ INDEX BLDA+IHG-RTO: 95 MM[HG]
HOROWITZ INDEX BLDA+IHG-RTO: 98 MM[HG]
HYALINE CASTS #/AREA URNS LPF: ABNORMAL /LPF
HYALINE CASTS #/AREA URNS LPF: ABNORMAL /LPF
HYPOCHROMIA BLD QL SMEAR: ABNORMAL
IL6 SERPL-MCNC: 41.7 PG/ML
IMM GRANULOCYTES # BLD AUTO: 0.01 K/UL (ref 0–0.11)
IMM GRANULOCYTES # BLD AUTO: 0.14 K/UL (ref 0–0.11)
IMM GRANULOCYTES # BLD AUTO: 0.15 K/UL (ref 0–0.11)
IMM GRANULOCYTES # BLD AUTO: 0.16 K/UL (ref 0–0.11)
IMM GRANULOCYTES # BLD AUTO: 0.25 K/UL (ref 0–0.11)
IMM GRANULOCYTES # BLD AUTO: 0.29 K/UL (ref 0–0.11)
IMM GRANULOCYTES # BLD AUTO: 0.31 K/UL (ref 0–0.11)
IMM GRANULOCYTES # BLD AUTO: 0.31 K/UL (ref 0–0.11)
IMM GRANULOCYTES # BLD AUTO: 0.39 K/UL (ref 0–0.11)
IMM GRANULOCYTES # BLD AUTO: 0.47 K/UL (ref 0–0.11)
IMM GRANULOCYTES NFR BLD AUTO: 0.2 % (ref 0–0.9)
IMM GRANULOCYTES NFR BLD AUTO: 0.9 % (ref 0–0.9)
IMM GRANULOCYTES NFR BLD AUTO: 1.1 % (ref 0–0.9)
IMM GRANULOCYTES NFR BLD AUTO: 1.2 % (ref 0–0.9)
IMM GRANULOCYTES NFR BLD AUTO: 1.4 % (ref 0–0.9)
IMM GRANULOCYTES NFR BLD AUTO: 1.6 % (ref 0–0.9)
IMM GRANULOCYTES NFR BLD AUTO: 2.6 % (ref 0–0.9)
IMM GRANULOCYTES NFR BLD AUTO: 2.9 % (ref 0–0.9)
IMM GRANULOCYTES NFR BLD AUTO: 4.6 % (ref 0–0.9)
IMM GRANULOCYTES NFR BLD AUTO: 5 % (ref 0–0.9)
INST. QUALIFIED PATIENT IIQPT: YES
KETONES UR STRIP.AUTO-MCNC: NEGATIVE MG/DL
KETONES UR STRIP.AUTO-MCNC: NEGATIVE MG/DL
LACTATE BLD-SCNC: 1.7 MMOL/L (ref 0.5–2)
LEUKOCYTE ESTERASE UR QL STRIP.AUTO: NEGATIVE
LEUKOCYTE ESTERASE UR QL STRIP.AUTO: NEGATIVE
LYMPHOCYTES # BLD AUTO: 0.1 K/UL (ref 1–4.8)
LYMPHOCYTES # BLD AUTO: 0.39 K/UL (ref 1–4.8)
LYMPHOCYTES # BLD AUTO: 0.43 K/UL (ref 1–4.8)
LYMPHOCYTES # BLD AUTO: 0.52 K/UL (ref 1–4.8)
LYMPHOCYTES # BLD AUTO: 0.67 K/UL (ref 1–4.8)
LYMPHOCYTES # BLD AUTO: 0.69 K/UL (ref 1–4.8)
LYMPHOCYTES # BLD AUTO: 0.74 K/UL (ref 1–4.8)
LYMPHOCYTES # BLD AUTO: 0.77 K/UL (ref 1–4.8)
LYMPHOCYTES # BLD AUTO: 0.87 K/UL (ref 1–4.8)
LYMPHOCYTES # BLD AUTO: 0.87 K/UL (ref 1–4.8)
LYMPHOCYTES # BLD AUTO: 0.88 K/UL (ref 1–4.8)
LYMPHOCYTES # BLD AUTO: 0.94 K/UL (ref 1–4.8)
LYMPHOCYTES # BLD AUTO: 1 K/UL (ref 1–4.8)
LYMPHOCYTES # BLD AUTO: 1.01 K/UL (ref 1–4.8)
LYMPHOCYTES # BLD AUTO: 1.04 K/UL (ref 1–4.8)
LYMPHOCYTES # BLD AUTO: 1.09 K/UL (ref 1–4.8)
LYMPHOCYTES # BLD AUTO: 1.13 K/UL (ref 1–4.8)
LYMPHOCYTES # BLD AUTO: 1.14 K/UL (ref 1–4.8)
LYMPHOCYTES # BLD AUTO: 1.14 K/UL (ref 1–4.8)
LYMPHOCYTES # BLD AUTO: 1.18 K/UL (ref 1–4.8)
LYMPHOCYTES # BLD AUTO: 1.31 K/UL (ref 1–4.8)
LYMPHOCYTES # BLD AUTO: 1.53 K/UL (ref 1–4.8)
LYMPHOCYTES NFR BLD: 0.8 % (ref 22–41)
LYMPHOCYTES NFR BLD: 10.7 % (ref 22–41)
LYMPHOCYTES NFR BLD: 11.3 % (ref 22–41)
LYMPHOCYTES NFR BLD: 11.6 % (ref 22–41)
LYMPHOCYTES NFR BLD: 12 % (ref 22–41)
LYMPHOCYTES NFR BLD: 13 % (ref 22–41)
LYMPHOCYTES NFR BLD: 14.9 % (ref 22–41)
LYMPHOCYTES NFR BLD: 2.6 % (ref 22–41)
LYMPHOCYTES NFR BLD: 25.2 % (ref 22–41)
LYMPHOCYTES NFR BLD: 3.3 % (ref 22–41)
LYMPHOCYTES NFR BLD: 4.9 % (ref 22–41)
LYMPHOCYTES NFR BLD: 5.2 % (ref 22–41)
LYMPHOCYTES NFR BLD: 5.3 % (ref 22–41)
LYMPHOCYTES NFR BLD: 5.4 % (ref 22–41)
LYMPHOCYTES NFR BLD: 5.8 % (ref 22–41)
LYMPHOCYTES NFR BLD: 5.8 % (ref 22–41)
LYMPHOCYTES NFR BLD: 6.2 % (ref 22–41)
LYMPHOCYTES NFR BLD: 6.9 % (ref 22–41)
LYMPHOCYTES NFR BLD: 8.1 % (ref 22–41)
LYMPHOCYTES NFR BLD: 8.5 % (ref 22–41)
LYMPHOCYTES NFR BLD: 8.7 % (ref 22–41)
LYMPHOCYTES NFR BLD: 8.8 % (ref 22–41)
MAGNESIUM SERPL-MCNC: 1.9 MG/DL (ref 1.5–2.5)
MAGNESIUM SERPL-MCNC: 1.9 MG/DL (ref 1.5–2.5)
MAGNESIUM SERPL-MCNC: 2 MG/DL (ref 1.5–2.5)
MAGNESIUM SERPL-MCNC: 2 MG/DL (ref 1.5–2.5)
MAGNESIUM SERPL-MCNC: 2.1 MG/DL (ref 1.5–2.5)
MAGNESIUM SERPL-MCNC: 2.2 MG/DL (ref 1.5–2.5)
MAGNESIUM SERPL-MCNC: 2.3 MG/DL (ref 1.5–2.5)
MAGNESIUM SERPL-MCNC: 2.3 MG/DL (ref 1.5–2.5)
MAGNESIUM SERPL-MCNC: 2.4 MG/DL (ref 1.5–2.5)
MAGNESIUM SERPL-MCNC: 2.5 MG/DL (ref 1.5–2.5)
MAGNESIUM SERPL-MCNC: 2.7 MG/DL (ref 1.5–2.5)
MANUAL DIFF BLD: ABNORMAL
MANUAL DIFF BLD: NORMAL
MCH RBC QN AUTO: 29 PG (ref 27–33)
MCH RBC QN AUTO: 29 PG (ref 27–33)
MCH RBC QN AUTO: 29.2 PG (ref 27–33)
MCH RBC QN AUTO: 29.4 PG (ref 27–33)
MCH RBC QN AUTO: 29.5 PG (ref 27–33)
MCH RBC QN AUTO: 29.6 PG (ref 27–33)
MCH RBC QN AUTO: 29.7 PG (ref 27–33)
MCH RBC QN AUTO: 29.9 PG (ref 27–33)
MCH RBC QN AUTO: 30 PG (ref 27–33)
MCH RBC QN AUTO: 30.1 PG (ref 27–33)
MCH RBC QN AUTO: 30.1 PG (ref 27–33)
MCH RBC QN AUTO: 30.2 PG (ref 27–33)
MCH RBC QN AUTO: 30.3 PG (ref 27–33)
MCH RBC QN AUTO: 30.4 PG (ref 27–33)
MCH RBC QN AUTO: 30.4 PG (ref 27–33)
MCH RBC QN AUTO: 30.6 PG (ref 27–33)
MCHC RBC AUTO-ENTMCNC: 29.1 G/DL (ref 33.7–35.3)
MCHC RBC AUTO-ENTMCNC: 30.4 G/DL (ref 33.7–35.3)
MCHC RBC AUTO-ENTMCNC: 30.6 G/DL (ref 33.7–35.3)
MCHC RBC AUTO-ENTMCNC: 30.8 G/DL (ref 33.7–35.3)
MCHC RBC AUTO-ENTMCNC: 30.8 G/DL (ref 33.7–35.3)
MCHC RBC AUTO-ENTMCNC: 31.3 G/DL (ref 33.7–35.3)
MCHC RBC AUTO-ENTMCNC: 32 G/DL (ref 33.7–35.3)
MCHC RBC AUTO-ENTMCNC: 32 G/DL (ref 33.7–35.3)
MCHC RBC AUTO-ENTMCNC: 32.2 G/DL (ref 33.7–35.3)
MCHC RBC AUTO-ENTMCNC: 32.5 G/DL (ref 33.7–35.3)
MCHC RBC AUTO-ENTMCNC: 32.5 G/DL (ref 33.7–35.3)
MCHC RBC AUTO-ENTMCNC: 32.6 G/DL (ref 33.7–35.3)
MCHC RBC AUTO-ENTMCNC: 32.7 G/DL (ref 33.7–35.3)
MCHC RBC AUTO-ENTMCNC: 32.8 G/DL (ref 33.7–35.3)
MCHC RBC AUTO-ENTMCNC: 32.9 G/DL (ref 33.7–35.3)
MCHC RBC AUTO-ENTMCNC: 33.3 G/DL (ref 33.7–35.3)
MCHC RBC AUTO-ENTMCNC: 33.7 G/DL (ref 33.7–35.3)
MCHC RBC AUTO-ENTMCNC: 33.9 G/DL (ref 33.7–35.3)
MCV RBC AUTO: 88.5 FL (ref 81.4–97.8)
MCV RBC AUTO: 88.6 FL (ref 81.4–97.8)
MCV RBC AUTO: 89.7 FL (ref 81.4–97.8)
MCV RBC AUTO: 91 FL (ref 81.4–97.8)
MCV RBC AUTO: 91.5 FL (ref 81.4–97.8)
MCV RBC AUTO: 91.9 FL (ref 81.4–97.8)
MCV RBC AUTO: 91.9 FL (ref 81.4–97.8)
MCV RBC AUTO: 92.3 FL (ref 81.4–97.8)
MCV RBC AUTO: 92.9 FL (ref 81.4–97.8)
MCV RBC AUTO: 93 FL (ref 81.4–97.8)
MCV RBC AUTO: 93.1 FL (ref 81.4–97.8)
MCV RBC AUTO: 93.1 FL (ref 81.4–97.8)
MCV RBC AUTO: 93.2 FL (ref 81.4–97.8)
MCV RBC AUTO: 93.3 FL (ref 81.4–97.8)
MCV RBC AUTO: 93.5 FL (ref 81.4–97.8)
MCV RBC AUTO: 93.9 FL (ref 81.4–97.8)
MCV RBC AUTO: 94.3 FL (ref 81.4–97.8)
MCV RBC AUTO: 95 FL (ref 81.4–97.8)
MCV RBC AUTO: 95.4 FL (ref 81.4–97.8)
MCV RBC AUTO: 95.7 FL (ref 81.4–97.8)
MCV RBC AUTO: 97.9 FL (ref 81.4–97.8)
MCV RBC AUTO: 99.7 FL (ref 81.4–97.8)
METAMYELOCYTES NFR BLD MANUAL: 0.8 %
METAMYELOCYTES NFR BLD MANUAL: 0.9 %
MICRO URNS: ABNORMAL
MICRO URNS: ABNORMAL
MICROCYTES BLD QL SMEAR: ABNORMAL
MONOCYTES # BLD AUTO: 0 K/UL (ref 0–0.85)
MONOCYTES # BLD AUTO: 0 K/UL (ref 0–0.85)
MONOCYTES # BLD AUTO: 0.12 K/UL (ref 0–0.85)
MONOCYTES # BLD AUTO: 0.13 K/UL (ref 0–0.85)
MONOCYTES # BLD AUTO: 0.15 K/UL (ref 0–0.85)
MONOCYTES # BLD AUTO: 0.19 K/UL (ref 0–0.85)
MONOCYTES # BLD AUTO: 0.19 K/UL (ref 0–0.85)
MONOCYTES # BLD AUTO: 0.21 K/UL (ref 0–0.85)
MONOCYTES # BLD AUTO: 0.22 K/UL (ref 0–0.85)
MONOCYTES # BLD AUTO: 0.28 K/UL (ref 0–0.85)
MONOCYTES # BLD AUTO: 0.29 K/UL (ref 0–0.85)
MONOCYTES # BLD AUTO: 0.3 K/UL (ref 0–0.85)
MONOCYTES # BLD AUTO: 0.3 K/UL (ref 0–0.85)
MONOCYTES # BLD AUTO: 0.33 K/UL (ref 0–0.85)
MONOCYTES # BLD AUTO: 0.34 K/UL (ref 0–0.85)
MONOCYTES # BLD AUTO: 0.35 K/UL (ref 0–0.85)
MONOCYTES # BLD AUTO: 0.37 K/UL (ref 0–0.85)
MONOCYTES # BLD AUTO: 0.42 K/UL (ref 0–0.85)
MONOCYTES # BLD AUTO: 0.43 K/UL (ref 0–0.85)
MONOCYTES # BLD AUTO: 0.48 K/UL (ref 0–0.85)
MONOCYTES # BLD AUTO: 0.52 K/UL (ref 0–0.85)
MONOCYTES # BLD AUTO: 0.61 K/UL (ref 0–0.85)
MONOCYTES NFR BLD AUTO: 0 % (ref 0–13.4)
MONOCYTES NFR BLD AUTO: 0 % (ref 0–13.4)
MONOCYTES NFR BLD AUTO: 0.9 % (ref 0–13.4)
MONOCYTES NFR BLD AUTO: 0.9 % (ref 0–13.4)
MONOCYTES NFR BLD AUTO: 1 % (ref 0–13.4)
MONOCYTES NFR BLD AUTO: 1.8 % (ref 0–13.4)
MONOCYTES NFR BLD AUTO: 1.8 % (ref 0–13.4)
MONOCYTES NFR BLD AUTO: 2.3 % (ref 0–13.4)
MONOCYTES NFR BLD AUTO: 2.4 % (ref 0–13.4)
MONOCYTES NFR BLD AUTO: 2.5 % (ref 0–13.4)
MONOCYTES NFR BLD AUTO: 2.6 % (ref 0–13.4)
MONOCYTES NFR BLD AUTO: 2.7 % (ref 0–13.4)
MONOCYTES NFR BLD AUTO: 2.8 % (ref 0–13.4)
MONOCYTES NFR BLD AUTO: 2.9 % (ref 0–13.4)
MONOCYTES NFR BLD AUTO: 3 % (ref 0–13.4)
MONOCYTES NFR BLD AUTO: 3 % (ref 0–13.4)
MONOCYTES NFR BLD AUTO: 3.1 % (ref 0–13.4)
MONOCYTES NFR BLD AUTO: 3.5 % (ref 0–13.4)
MONOCYTES NFR BLD AUTO: 4.1 % (ref 0–13.4)
MONOCYTES NFR BLD AUTO: 4.3 % (ref 0–13.4)
MONOCYTES NFR BLD AUTO: 4.4 % (ref 0–13.4)
MONOCYTES NFR BLD AUTO: 4.7 % (ref 0–13.4)
MORPHOLOGY BLD-IMP: NORMAL
MRSA DNA SPEC QL NAA+PROBE: NORMAL
MYELOCYTES NFR BLD MANUAL: 0.9 %
MYELOCYTES NFR BLD MANUAL: 0.9 %
MYELOCYTES NFR BLD MANUAL: 1.8 %
MYELOCYTES NFR BLD MANUAL: 2.6 %
MYELOCYTES NFR BLD MANUAL: 3.6 %
MYELOCYTES NFR BLD MANUAL: 7 %
NEUTROPHILS # BLD AUTO: 10.18 K/UL (ref 1.82–7.42)
NEUTROPHILS # BLD AUTO: 10.31 K/UL (ref 1.82–7.42)
NEUTROPHILS # BLD AUTO: 10.7 K/UL (ref 1.82–7.42)
NEUTROPHILS # BLD AUTO: 11.1 K/UL (ref 1.82–7.42)
NEUTROPHILS # BLD AUTO: 11.2 K/UL (ref 1.82–7.42)
NEUTROPHILS # BLD AUTO: 12.26 K/UL (ref 1.82–7.42)
NEUTROPHILS # BLD AUTO: 12.39 K/UL (ref 1.82–7.42)
NEUTROPHILS # BLD AUTO: 14.35 K/UL (ref 1.82–7.42)
NEUTROPHILS # BLD AUTO: 14.6 K/UL (ref 1.82–7.42)
NEUTROPHILS # BLD AUTO: 15.4 K/UL (ref 1.82–7.42)
NEUTROPHILS # BLD AUTO: 19.09 K/UL (ref 1.82–7.42)
NEUTROPHILS # BLD AUTO: 2.79 K/UL (ref 1.82–7.42)
NEUTROPHILS # BLD AUTO: 6.48 K/UL (ref 1.82–7.42)
NEUTROPHILS # BLD AUTO: 6.78 K/UL (ref 1.82–7.42)
NEUTROPHILS # BLD AUTO: 7.79 K/UL (ref 1.82–7.42)
NEUTROPHILS # BLD AUTO: 7.93 K/UL (ref 1.82–7.42)
NEUTROPHILS # BLD AUTO: 7.94 K/UL (ref 1.82–7.42)
NEUTROPHILS # BLD AUTO: 8.25 K/UL (ref 1.82–7.42)
NEUTROPHILS # BLD AUTO: 8.4 K/UL (ref 1.82–7.42)
NEUTROPHILS # BLD AUTO: 8.59 K/UL (ref 1.82–7.42)
NEUTROPHILS # BLD AUTO: 8.88 K/UL (ref 1.82–7.42)
NEUTROPHILS # BLD AUTO: 9.3 K/UL (ref 1.82–7.42)
NEUTROPHILS NFR BLD: 55.3 % (ref 44–72)
NEUTROPHILS NFR BLD: 64.9 % (ref 44–72)
NEUTROPHILS NFR BLD: 66.1 % (ref 44–72)
NEUTROPHILS NFR BLD: 69.7 % (ref 44–72)
NEUTROPHILS NFR BLD: 77.7 % (ref 44–72)
NEUTROPHILS NFR BLD: 77.7 % (ref 44–72)
NEUTROPHILS NFR BLD: 78.4 % (ref 44–72)
NEUTROPHILS NFR BLD: 80.5 % (ref 44–72)
NEUTROPHILS NFR BLD: 81.7 % (ref 44–72)
NEUTROPHILS NFR BLD: 82 % (ref 44–72)
NEUTROPHILS NFR BLD: 82.3 % (ref 44–72)
NEUTROPHILS NFR BLD: 84.2 % (ref 44–72)
NEUTROPHILS NFR BLD: 85.9 % (ref 44–72)
NEUTROPHILS NFR BLD: 86.8 % (ref 44–72)
NEUTROPHILS NFR BLD: 87 % (ref 44–72)
NEUTROPHILS NFR BLD: 87.8 % (ref 44–72)
NEUTROPHILS NFR BLD: 89.6 % (ref 44–72)
NEUTROPHILS NFR BLD: 89.6 % (ref 44–72)
NEUTROPHILS NFR BLD: 90.1 % (ref 44–72)
NEUTROPHILS NFR BLD: 91.8 % (ref 44–72)
NEUTROPHILS NFR BLD: 92.2 % (ref 44–72)
NEUTROPHILS NFR BLD: 92.5 % (ref 44–72)
NEUTS BAND NFR BLD MANUAL: 0.8 % (ref 0–10)
NEUTS BAND NFR BLD MANUAL: 0.9 % (ref 0–10)
NEUTS BAND NFR BLD MANUAL: 1.7 % (ref 0–10)
NEUTS BAND NFR BLD MANUAL: 12.2 % (ref 0–10)
NEUTS BAND NFR BLD MANUAL: 13.6 % (ref 0–10)
NEUTS BAND NFR BLD MANUAL: 26.3 % (ref 0–10)
NEUTS BAND NFR BLD MANUAL: 6.1 % (ref 0–10)
NITRITE UR QL STRIP.AUTO: NEGATIVE
NITRITE UR QL STRIP.AUTO: NEGATIVE
NRBC # BLD AUTO: 0 K/UL
NRBC # BLD AUTO: 0.02 K/UL
NRBC # BLD AUTO: 0.03 K/UL
NRBC # BLD AUTO: 0.03 K/UL
NRBC # BLD AUTO: 0.04 K/UL
NRBC # BLD AUTO: 0.04 K/UL
NRBC # BLD AUTO: 0.06 K/UL
NRBC BLD-RTO: 0 /100 WBC
NRBC BLD-RTO: 0.2 /100 WBC
NRBC BLD-RTO: 0.2 /100 WBC
NRBC BLD-RTO: 0.3 /100 WBC
NRBC BLD-RTO: 0.3 /100 WBC
NRBC BLD-RTO: 0.4 /100 WBC
NRBC BLD-RTO: 0.5 /100 WBC
NT-PROBNP SERPL IA-MCNC: 174 PG/ML (ref 0–125)
O2/TOTAL GAS SETTING VFR VENT: 100 %
O2/TOTAL GAS SETTING VFR VENT: 50 %
O2/TOTAL GAS SETTING VFR VENT: 60 %
O2/TOTAL GAS SETTING VFR VENT: 70 %
O2/TOTAL GAS SETTING VFR VENT: 80 %
O2/TOTAL GAS SETTING VFR VENT: 90 %
OVALOCYTES BLD QL SMEAR: NORMAL
PCO2 BLDA: 37.8 MMHG (ref 26–37)
PCO2 BLDA: 40 MMHG (ref 26–37)
PCO2 BLDA: 47.5 MMHG (ref 26–37)
PCO2 BLDA: 49 MMHG (ref 26–37)
PCO2 BLDA: 49.3 MMHG (ref 26–37)
PCO2 BLDA: 50.7 MMHG (ref 26–37)
PCO2 BLDA: 51.3 MMHG (ref 26–37)
PCO2 BLDA: 51.3 MMHG (ref 26–37)
PCO2 BLDA: 51.6 MMHG (ref 26–37)
PCO2 BLDA: 55.3 MMHG (ref 26–37)
PCO2 BLDA: 56.8 MMHG (ref 26–37)
PCO2 BLDA: 57.4 MMHG (ref 26–37)
PCO2 BLDA: 57.6 MMHG (ref 26–37)
PCO2 BLDA: 57.9 MMHG (ref 26–37)
PCO2 BLDA: 60.8 MMHG (ref 26–37)
PCO2 BLDA: 64 MMHG (ref 26–37)
PCO2 BLDA: 66 MMHG (ref 26–37)
PCO2 BLDA: 70.4 MMHG (ref 26–37)
PCO2 BLDA: 71.6 MMHG (ref 26–37)
PCO2 BLDA: 74.1 MMHG (ref 26–37)
PCO2 BLDA: 78.7 MMHG (ref 26–37)
PCO2 BLDA: 85.4 MMHG (ref 26–37)
PCO2 BLDA: 87 MMHG (ref 26–37)
PCO2 BLDA: 89.9 MMHG (ref 26–37)
PCO2 BLDA: >100 MMHG (ref 26–37)
PCO2 BLDA: >100 MMHG (ref 26–37)
PCO2 TEMP ADJ BLDA: 36 MMHG (ref 26–37)
PCO2 TEMP ADJ BLDA: 39.4 MMHG (ref 26–37)
PCO2 TEMP ADJ BLDA: 45.8 MMHG (ref 26–37)
PCO2 TEMP ADJ BLDA: 51.1 MMHG (ref 26–37)
PCO2 TEMP ADJ BLDA: 51.8 MMHG (ref 26–37)
PCO2 TEMP ADJ BLDA: 52 MMHG (ref 26–37)
PCO2 TEMP ADJ BLDA: 52.2 MMHG (ref 26–37)
PCO2 TEMP ADJ BLDA: 52.8 MMHG (ref 26–37)
PCO2 TEMP ADJ BLDA: 54.3 MMHG (ref 26–37)
PCO2 TEMP ADJ BLDA: 55.3 MMHG (ref 26–37)
PCO2 TEMP ADJ BLDA: 56.3 MMHG (ref 26–37)
PCO2 TEMP ADJ BLDA: 57.9 MMHG (ref 26–37)
PCO2 TEMP ADJ BLDA: 58.4 MMHG (ref 26–37)
PCO2 TEMP ADJ BLDA: 58.6 MMHG (ref 26–37)
PCO2 TEMP ADJ BLDA: 59.6 MMHG (ref 26–37)
PCO2 TEMP ADJ BLDA: 63.7 MMHG (ref 26–37)
PCO2 TEMP ADJ BLDA: 66.8 MMHG (ref 26–37)
PCO2 TEMP ADJ BLDA: 69.4 MMHG (ref 26–37)
PCO2 TEMP ADJ BLDA: 69.8 MMHG (ref 26–37)
PCO2 TEMP ADJ BLDA: 75.1 MMHG (ref 26–37)
PCO2 TEMP ADJ BLDA: 81.9 MMHG (ref 26–37)
PCO2 TEMP ADJ BLDA: 85.5 MMHG (ref 26–37)
PCO2 TEMP ADJ BLDA: 86 MMHG (ref 26–37)
PCO2 TEMP ADJ BLDA: 90.8 MMHG (ref 26–37)
PCO2 TEMP ADJ BLDA: 98 MMHG (ref 26–37)
PCO2 TEMP ADJ BLDA: >100 MMHG (ref 26–37)
PH BLDA: 7.16 [PH] (ref 7.4–7.5)
PH BLDA: 7.17 [PH] (ref 7.4–7.5)
PH BLDA: 7.19 [PH] (ref 7.4–7.5)
PH BLDA: 7.2 [PH] (ref 7.4–7.5)
PH BLDA: 7.22 [PH] (ref 7.4–7.5)
PH BLDA: 7.23 [PH] (ref 7.4–7.5)
PH BLDA: 7.26 [PH] (ref 7.4–7.5)
PH BLDA: 7.27 [PH] (ref 7.4–7.5)
PH BLDA: 7.3 [PH] (ref 7.4–7.5)
PH BLDA: 7.35 [PH] (ref 7.4–7.5)
PH BLDA: 7.35 [PH] (ref 7.4–7.5)
PH BLDA: 7.36 [PH] (ref 7.4–7.5)
PH BLDA: 7.38 [PH] (ref 7.4–7.5)
PH BLDA: 7.4 [PH] (ref 7.4–7.5)
PH BLDA: 7.4 [PH] (ref 7.4–7.5)
PH BLDA: 7.41 [PH] (ref 7.4–7.5)
PH BLDA: 7.43 [PH] (ref 7.4–7.5)
PH BLDA: 7.43 [PH] (ref 7.4–7.5)
PH BLDA: 7.44 [PH] (ref 7.4–7.5)
PH BLDA: 7.44 [PH] (ref 7.4–7.5)
PH BLDA: 7.46 [PH] (ref 7.4–7.5)
PH BLDA: 7.46 [PH] (ref 7.4–7.5)
PH BLDA: 7.47 [PH] (ref 7.4–7.5)
PH BLDA: 7.47 [PH] (ref 7.4–7.5)
PH BLDA: 7.48 [PH] (ref 7.4–7.5)
PH BLDA: 7.53 [PH] (ref 7.4–7.5)
PH TEMP ADJ BLDA: 7.14 [PH] (ref 7.4–7.5)
PH TEMP ADJ BLDA: 7.18 [PH] (ref 7.4–7.5)
PH TEMP ADJ BLDA: 7.2 [PH] (ref 7.4–7.5)
PH TEMP ADJ BLDA: 7.2 [PH] (ref 7.4–7.5)
PH TEMP ADJ BLDA: 7.21 [PH] (ref 7.4–7.5)
PH TEMP ADJ BLDA: 7.23 [PH] (ref 7.4–7.5)
PH TEMP ADJ BLDA: 7.28 [PH] (ref 7.4–7.5)
PH TEMP ADJ BLDA: 7.28 [PH] (ref 7.4–7.5)
PH TEMP ADJ BLDA: 7.3 [PH] (ref 7.4–7.5)
PH TEMP ADJ BLDA: 7.35 [PH] (ref 7.4–7.5)
PH TEMP ADJ BLDA: 7.36 [PH] (ref 7.4–7.5)
PH TEMP ADJ BLDA: 7.36 [PH] (ref 7.4–7.5)
PH TEMP ADJ BLDA: 7.38 [PH] (ref 7.4–7.5)
PH TEMP ADJ BLDA: 7.4 [PH] (ref 7.4–7.5)
PH TEMP ADJ BLDA: 7.4 [PH] (ref 7.4–7.5)
PH TEMP ADJ BLDA: 7.41 [PH] (ref 7.4–7.5)
PH TEMP ADJ BLDA: 7.42 [PH] (ref 7.4–7.5)
PH TEMP ADJ BLDA: 7.42 [PH] (ref 7.4–7.5)
PH TEMP ADJ BLDA: 7.43 [PH] (ref 7.4–7.5)
PH TEMP ADJ BLDA: 7.44 [PH] (ref 7.4–7.5)
PH TEMP ADJ BLDA: 7.46 [PH] (ref 7.4–7.5)
PH TEMP ADJ BLDA: 7.47 [PH] (ref 7.4–7.5)
PH TEMP ADJ BLDA: 7.48 [PH] (ref 7.4–7.5)
PH TEMP ADJ BLDA: 7.54 [PH] (ref 7.4–7.5)
PH UR STRIP.AUTO: 5.5 [PH] (ref 5–8)
PH UR STRIP.AUTO: 6 [PH] (ref 5–8)
PHOSPHATE SERPL-MCNC: 1.7 MG/DL (ref 2.5–4.5)
PHOSPHATE SERPL-MCNC: 1.9 MG/DL (ref 2.5–4.5)
PHOSPHATE SERPL-MCNC: 2.1 MG/DL (ref 2.5–4.5)
PHOSPHATE SERPL-MCNC: 2.3 MG/DL (ref 2.5–4.5)
PHOSPHATE SERPL-MCNC: 2.3 MG/DL (ref 2.5–4.5)
PHOSPHATE SERPL-MCNC: 2.4 MG/DL (ref 2.5–4.5)
PHOSPHATE SERPL-MCNC: 2.6 MG/DL (ref 2.5–4.5)
PHOSPHATE SERPL-MCNC: 2.7 MG/DL (ref 2.5–4.5)
PHOSPHATE SERPL-MCNC: 2.7 MG/DL (ref 2.5–4.5)
PHOSPHATE SERPL-MCNC: 2.8 MG/DL (ref 2.5–4.5)
PHOSPHATE SERPL-MCNC: 2.9 MG/DL (ref 2.5–4.5)
PHOSPHATE SERPL-MCNC: 3.3 MG/DL (ref 2.5–4.5)
PHOSPHATE SERPL-MCNC: 4.5 MG/DL (ref 2.5–4.5)
PHOSPHATE SERPL-MCNC: 6.6 MG/DL (ref 2.5–4.5)
PLATELET # BLD AUTO: 147 K/UL (ref 164–446)
PLATELET # BLD AUTO: 181 K/UL (ref 164–446)
PLATELET # BLD AUTO: 198 K/UL (ref 164–446)
PLATELET # BLD AUTO: 221 K/UL (ref 164–446)
PLATELET # BLD AUTO: 224 K/UL (ref 164–446)
PLATELET # BLD AUTO: 227 K/UL (ref 164–446)
PLATELET # BLD AUTO: 229 K/UL (ref 164–446)
PLATELET # BLD AUTO: 232 K/UL (ref 164–446)
PLATELET # BLD AUTO: 238 K/UL (ref 164–446)
PLATELET # BLD AUTO: 242 K/UL (ref 164–446)
PLATELET # BLD AUTO: 242 K/UL (ref 164–446)
PLATELET # BLD AUTO: 251 K/UL (ref 164–446)
PLATELET # BLD AUTO: 256 K/UL (ref 164–446)
PLATELET # BLD AUTO: 260 K/UL (ref 164–446)
PLATELET # BLD AUTO: 268 K/UL (ref 164–446)
PLATELET # BLD AUTO: 270 K/UL (ref 164–446)
PLATELET # BLD AUTO: 271 K/UL (ref 164–446)
PLATELET # BLD AUTO: 274 K/UL (ref 164–446)
PLATELET # BLD AUTO: 281 K/UL (ref 164–446)
PLATELET # BLD AUTO: 296 K/UL (ref 164–446)
PLATELET # BLD AUTO: 296 K/UL (ref 164–446)
PLATELET # BLD AUTO: 309 K/UL (ref 164–446)
PLATELET BLD QL SMEAR: NORMAL
PMV BLD AUTO: 10 FL (ref 9–12.9)
PMV BLD AUTO: 10.2 FL (ref 9–12.9)
PMV BLD AUTO: 10.3 FL (ref 9–12.9)
PMV BLD AUTO: 10.3 FL (ref 9–12.9)
PMV BLD AUTO: 10.4 FL (ref 9–12.9)
PMV BLD AUTO: 10.5 FL (ref 9–12.9)
PMV BLD AUTO: 10.6 FL (ref 9–12.9)
PMV BLD AUTO: 10.7 FL (ref 9–12.9)
PMV BLD AUTO: 10.9 FL (ref 9–12.9)
PMV BLD AUTO: 11 FL (ref 9–12.9)
PMV BLD AUTO: 11.2 FL (ref 9–12.9)
PMV BLD AUTO: 11.6 FL (ref 9–12.9)
PMV BLD AUTO: 11.8 FL (ref 9–12.9)
PO2 BLDA: 105 MMHG (ref 64–87)
PO2 BLDA: 126 MMHG (ref 64–87)
PO2 BLDA: 178 MMHG (ref 64–87)
PO2 BLDA: 35 MMHG (ref 64–87)
PO2 BLDA: 42 MMHG (ref 64–87)
PO2 BLDA: 44 MMHG (ref 64–87)
PO2 BLDA: 45 MMHG (ref 64–87)
PO2 BLDA: 47 MMHG (ref 64–87)
PO2 BLDA: 47 MMHG (ref 64–87)
PO2 BLDA: 53 MMHG (ref 64–87)
PO2 BLDA: 54 MMHG (ref 64–87)
PO2 BLDA: 56 MMHG (ref 64–87)
PO2 BLDA: 59 MMHG (ref 64–87)
PO2 BLDA: 60 MMHG (ref 64–87)
PO2 BLDA: 69 MMHG (ref 64–87)
PO2 BLDA: 70 MMHG (ref 64–87)
PO2 BLDA: 77 MMHG (ref 64–87)
PO2 BLDA: 78 MMHG (ref 64–87)
PO2 BLDA: 80 MMHG (ref 64–87)
PO2 BLDA: 82 MMHG (ref 64–87)
PO2 BLDA: 84 MMHG (ref 64–87)
PO2 BLDA: 87 MMHG (ref 64–87)
PO2 BLDA: 90 MMHG (ref 64–87)
PO2 BLDA: 92 MMHG (ref 64–87)
PO2 BLDA: 95 MMHG (ref 64–87)
PO2 BLDA: 98 MMHG (ref 64–87)
PO2 TEMP ADJ BLDA: 106 MMHG (ref 64–87)
PO2 TEMP ADJ BLDA: 127 MMHG (ref 64–87)
PO2 TEMP ADJ BLDA: 183 MMHG (ref 64–87)
PO2 TEMP ADJ BLDA: 38 MMHG (ref 64–87)
PO2 TEMP ADJ BLDA: 39 MMHG (ref 64–87)
PO2 TEMP ADJ BLDA: 41 MMHG (ref 64–87)
PO2 TEMP ADJ BLDA: 41 MMHG (ref 64–87)
PO2 TEMP ADJ BLDA: 44 MMHG (ref 64–87)
PO2 TEMP ADJ BLDA: 46 MMHG (ref 64–87)
PO2 TEMP ADJ BLDA: 52 MMHG (ref 64–87)
PO2 TEMP ADJ BLDA: 52 MMHG (ref 64–87)
PO2 TEMP ADJ BLDA: 54 MMHG (ref 64–87)
PO2 TEMP ADJ BLDA: 59 MMHG (ref 64–87)
PO2 TEMP ADJ BLDA: 61 MMHG (ref 64–87)
PO2 TEMP ADJ BLDA: 71 MMHG (ref 64–87)
PO2 TEMP ADJ BLDA: 76 MMHG (ref 64–87)
PO2 TEMP ADJ BLDA: 77 MMHG (ref 64–87)
PO2 TEMP ADJ BLDA: 82 MMHG (ref 64–87)
PO2 TEMP ADJ BLDA: 84 MMHG (ref 64–87)
PO2 TEMP ADJ BLDA: 87 MMHG (ref 64–87)
PO2 TEMP ADJ BLDA: 87 MMHG (ref 64–87)
PO2 TEMP ADJ BLDA: 88 MMHG (ref 64–87)
PO2 TEMP ADJ BLDA: 92 MMHG (ref 64–87)
PO2 TEMP ADJ BLDA: 92 MMHG (ref 64–87)
PO2 TEMP ADJ BLDA: 93 MMHG (ref 64–87)
PO2 TEMP ADJ BLDA: 94 MMHG (ref 64–87)
POIKILOCYTOSIS BLD QL SMEAR: NORMAL
POLYCHROMASIA BLD QL SMEAR: NORMAL
POTASSIUM SERPL-SCNC: 3.1 MMOL/L (ref 3.6–5.5)
POTASSIUM SERPL-SCNC: 3.2 MMOL/L (ref 3.6–5.5)
POTASSIUM SERPL-SCNC: 3.3 MMOL/L (ref 3.6–5.5)
POTASSIUM SERPL-SCNC: 3.5 MMOL/L (ref 3.6–5.5)
POTASSIUM SERPL-SCNC: 3.6 MMOL/L (ref 3.6–5.5)
POTASSIUM SERPL-SCNC: 3.6 MMOL/L (ref 3.6–5.5)
POTASSIUM SERPL-SCNC: 3.7 MMOL/L (ref 3.6–5.5)
POTASSIUM SERPL-SCNC: 3.7 MMOL/L (ref 3.6–5.5)
POTASSIUM SERPL-SCNC: 3.8 MMOL/L (ref 3.6–5.5)
POTASSIUM SERPL-SCNC: 3.8 MMOL/L (ref 3.6–5.5)
POTASSIUM SERPL-SCNC: 3.9 MMOL/L (ref 3.6–5.5)
POTASSIUM SERPL-SCNC: 4 MMOL/L (ref 3.6–5.5)
POTASSIUM SERPL-SCNC: 4 MMOL/L (ref 3.6–5.5)
POTASSIUM SERPL-SCNC: 4.1 MMOL/L (ref 3.6–5.5)
POTASSIUM SERPL-SCNC: 4.1 MMOL/L (ref 3.6–5.5)
POTASSIUM SERPL-SCNC: 4.2 MMOL/L (ref 3.6–5.5)
POTASSIUM SERPL-SCNC: 4.2 MMOL/L (ref 3.6–5.5)
POTASSIUM SERPL-SCNC: 4.8 MMOL/L (ref 3.6–5.5)
POTASSIUM SERPL-SCNC: 4.8 MMOL/L (ref 3.6–5.5)
PREALB SERPL-MCNC: 17.6 MG/DL (ref 18–38)
PREALB SERPL-MCNC: 7.7 MG/DL (ref 18–38)
PREALB SERPL-MCNC: 9.7 MG/DL (ref 18–38)
PROCALCITONIN SERPL-MCNC: 0.23 NG/ML
PROCALCITONIN SERPL-MCNC: 0.4 NG/ML
PROCALCITONIN SERPL-MCNC: <0.05 NG/ML
PROMYELOCYTES NFR BLD MANUAL: 0.9 %
PROMYELOCYTES NFR BLD MANUAL: 1.7 %
PROT SERPL-MCNC: 5.6 G/DL (ref 6–8.2)
PROT SERPL-MCNC: 5.7 G/DL (ref 6–8.2)
PROT SERPL-MCNC: 5.7 G/DL (ref 6–8.2)
PROT SERPL-MCNC: 5.9 G/DL (ref 6–8.2)
PROT SERPL-MCNC: 6 G/DL (ref 6–8.2)
PROT SERPL-MCNC: 6 G/DL (ref 6–8.2)
PROT SERPL-MCNC: 6.3 G/DL (ref 6–8.2)
PROT SERPL-MCNC: 6.3 G/DL (ref 6–8.2)
PROT SERPL-MCNC: 6.5 G/DL (ref 6–8.2)
PROT SERPL-MCNC: 7 G/DL (ref 6–8.2)
PROT SERPL-MCNC: 7.1 G/DL (ref 6–8.2)
PROT UR QL STRIP: 30 MG/DL
PROT UR QL STRIP: NEGATIVE MG/DL
RBC # BLD AUTO: 3.49 M/UL (ref 4.7–6.1)
RBC # BLD AUTO: 3.59 M/UL (ref 4.7–6.1)
RBC # BLD AUTO: 3.65 M/UL (ref 4.7–6.1)
RBC # BLD AUTO: 3.67 M/UL (ref 4.7–6.1)
RBC # BLD AUTO: 3.69 M/UL (ref 4.7–6.1)
RBC # BLD AUTO: 3.75 M/UL (ref 4.7–6.1)
RBC # BLD AUTO: 3.77 M/UL (ref 4.7–6.1)
RBC # BLD AUTO: 4.12 M/UL (ref 4.7–6.1)
RBC # BLD AUTO: 4.22 M/UL (ref 4.7–6.1)
RBC # BLD AUTO: 4.22 M/UL (ref 4.7–6.1)
RBC # BLD AUTO: 4.28 M/UL (ref 4.7–6.1)
RBC # BLD AUTO: 4.32 M/UL (ref 4.7–6.1)
RBC # BLD AUTO: 4.33 M/UL (ref 4.7–6.1)
RBC # BLD AUTO: 4.45 M/UL (ref 4.7–6.1)
RBC # BLD AUTO: 4.58 M/UL (ref 4.7–6.1)
RBC # BLD AUTO: 4.92 M/UL (ref 4.7–6.1)
RBC # BLD AUTO: 4.95 M/UL (ref 4.7–6.1)
RBC # BLD AUTO: 4.96 M/UL (ref 4.7–6.1)
RBC # BLD AUTO: 5.02 M/UL (ref 4.7–6.1)
RBC # BLD AUTO: 5.1 M/UL (ref 4.7–6.1)
RBC # BLD AUTO: 5.22 M/UL (ref 4.7–6.1)
RBC # BLD AUTO: 5.32 M/UL (ref 4.7–6.1)
RBC # URNS HPF: ABNORMAL /HPF
RBC # URNS HPF: ABNORMAL /HPF
RBC BLD AUTO: NORMAL
RBC BLD AUTO: NORMAL
RBC BLD AUTO: PRESENT
RBC UR QL AUTO: ABNORMAL
RBC UR QL AUTO: NEGATIVE
SAO2 % BLDA: 52 % (ref 93–99)
SAO2 % BLDA: 65 % (ref 93–99)
SAO2 % BLDA: 65 % (ref 93–99)
SAO2 % BLDA: 80 % (ref 93–99)
SAO2 % BLDA: 81 % (ref 93–99)
SAO2 % BLDA: 83 % (ref 93–99)
SAO2 % BLDA: 86 % (ref 93–99)
SAO2 % BLDA: 88 % (ref 93–99)
SAO2 % BLDA: 90 % (ref 93–99)
SAO2 % BLDA: 92 % (ref 93–99)
SAO2 % BLDA: 94 % (ref 93–99)
SAO2 % BLDA: 94 % (ref 93–99)
SAO2 % BLDA: 95 % (ref 93–99)
SAO2 % BLDA: 96 % (ref 93–99)
SAO2 % BLDA: 96 % (ref 93–99)
SAO2 % BLDA: 97 % (ref 93–99)
SAO2 % BLDA: 97 % (ref 93–99)
SAO2 % BLDA: 98 % (ref 93–99)
SAO2 % BLDA: 98 % (ref 93–99)
SAO2 % BLDA: 99 % (ref 93–99)
SAO2 % BLDA: 99 % (ref 93–99)
SARS-COV-2 RNA RESP QL NAA+PROBE: DETECTED
SIGNIFICANT IND 70042: ABNORMAL
SIGNIFICANT IND 70042: NORMAL
SITE SITE: ABNORMAL
SITE SITE: NORMAL
SMUDGE CELLS BLD QL SMEAR: NORMAL
SODIUM SERPL-SCNC: 129 MMOL/L (ref 135–145)
SODIUM SERPL-SCNC: 129 MMOL/L (ref 135–145)
SODIUM SERPL-SCNC: 131 MMOL/L (ref 135–145)
SODIUM SERPL-SCNC: 132 MMOL/L (ref 135–145)
SODIUM SERPL-SCNC: 133 MMOL/L (ref 135–145)
SODIUM SERPL-SCNC: 134 MMOL/L (ref 135–145)
SODIUM SERPL-SCNC: 134 MMOL/L (ref 135–145)
SODIUM SERPL-SCNC: 135 MMOL/L (ref 135–145)
SODIUM SERPL-SCNC: 136 MMOL/L (ref 135–145)
SODIUM SERPL-SCNC: 137 MMOL/L (ref 135–145)
SODIUM SERPL-SCNC: 137 MMOL/L (ref 135–145)
SODIUM SERPL-SCNC: 138 MMOL/L (ref 135–145)
SOURCE SOURCE: ABNORMAL
SOURCE SOURCE: NORMAL
SP GR UR STRIP.AUTO: 1.02
SP GR UR STRIP.AUTO: 1.03
SPECIMEN DRAWN FROM PATIENT: ABNORMAL
SPECIMEN SOURCE: ABNORMAL
STOMATOCYTES BLD QL SMEAR: NORMAL
TRIGL SERPL-MCNC: 160 MG/DL (ref 0–149)
TRIGL SERPL-MCNC: 193 MG/DL (ref 0–149)
TRIGL SERPL-MCNC: 288 MG/DL (ref 0–149)
TRIGL SERPL-MCNC: 335 MG/DL (ref 0–149)
TRIGL SERPL-MCNC: 371 MG/DL (ref 0–149)
TRIGL SERPL-MCNC: 513 MG/DL (ref 0–149)
TROPONIN T SERPL-MCNC: 13 NG/L (ref 6–19)
UROBILINOGEN UR STRIP.AUTO-MCNC: 0.2 MG/DL
UROBILINOGEN UR STRIP.AUTO-MCNC: 1 MG/DL
WBC # BLD AUTO: 10.1 K/UL (ref 4.8–10.8)
WBC # BLD AUTO: 10.2 K/UL (ref 4.8–10.8)
WBC # BLD AUTO: 10.3 K/UL (ref 4.8–10.8)
WBC # BLD AUTO: 10.8 K/UL (ref 4.8–10.8)
WBC # BLD AUTO: 11.4 K/UL (ref 4.8–10.8)
WBC # BLD AUTO: 11.9 K/UL (ref 4.8–10.8)
WBC # BLD AUTO: 11.9 K/UL (ref 4.8–10.8)
WBC # BLD AUTO: 12 K/UL (ref 4.8–10.8)
WBC # BLD AUTO: 12.7 K/UL (ref 4.8–10.8)
WBC # BLD AUTO: 13 K/UL (ref 4.8–10.8)
WBC # BLD AUTO: 13.2 K/UL (ref 4.8–10.8)
WBC # BLD AUTO: 13.4 K/UL (ref 4.8–10.8)
WBC # BLD AUTO: 16 K/UL (ref 4.8–10.8)
WBC # BLD AUTO: 16.3 K/UL (ref 4.8–10.8)
WBC # BLD AUTO: 16.4 K/UL (ref 4.8–10.8)
WBC # BLD AUTO: 21.2 K/UL (ref 4.8–10.8)
WBC # BLD AUTO: 4 K/UL (ref 4.8–10.8)
WBC # BLD AUTO: 7.9 K/UL (ref 4.8–10.8)
WBC # BLD AUTO: 9.5 K/UL (ref 4.8–10.8)
WBC # BLD AUTO: 9.8 K/UL (ref 4.8–10.8)
WBC #/AREA URNS HPF: ABNORMAL /HPF
WBC #/AREA URNS HPF: ABNORMAL /HPF

## 2020-01-01 PROCEDURE — 36600 WITHDRAWAL OF ARTERIAL BLOOD: CPT

## 2020-01-01 PROCEDURE — 80048 BASIC METABOLIC PNL TOTAL CA: CPT

## 2020-01-01 PROCEDURE — 700105 HCHG RX REV CODE 258: Performed by: INTERNAL MEDICINE

## 2020-01-01 PROCEDURE — 700102 HCHG RX REV CODE 250 W/ 637 OVERRIDE(OP): Performed by: INTERNAL MEDICINE

## 2020-01-01 PROCEDURE — 94760 N-INVAS EAR/PLS OXIMETRY 1: CPT

## 2020-01-01 PROCEDURE — 82803 BLOOD GASES ANY COMBINATION: CPT

## 2020-01-01 PROCEDURE — 82962 GLUCOSE BLOOD TEST: CPT

## 2020-01-01 PROCEDURE — 700111 HCHG RX REV CODE 636 W/ 250 OVERRIDE (IP): Performed by: INTERNAL MEDICINE

## 2020-01-01 PROCEDURE — 83735 ASSAY OF MAGNESIUM: CPT

## 2020-01-01 PROCEDURE — 5A1955Z RESPIRATORY VENTILATION, GREATER THAN 96 CONSECUTIVE HOURS: ICD-10-PCS | Performed by: INTERNAL MEDICINE

## 2020-01-01 PROCEDURE — 99292 CRITICAL CARE ADDL 30 MIN: CPT | Mod: 25 | Performed by: INTERNAL MEDICINE

## 2020-01-01 PROCEDURE — 37799 UNLISTED PX VASCULAR SURGERY: CPT

## 2020-01-01 PROCEDURE — 94003 VENT MGMT INPAT SUBQ DAY: CPT

## 2020-01-01 PROCEDURE — 82962 GLUCOSE BLOOD TEST: CPT | Mod: 91

## 2020-01-01 PROCEDURE — 80053 COMPREHEN METABOLIC PANEL: CPT

## 2020-01-01 PROCEDURE — 770022 HCHG ROOM/CARE - ICU (200)

## 2020-01-01 PROCEDURE — 94770 HCHG CO2 EXPIRED GAS DETERMINATION: CPT

## 2020-01-01 PROCEDURE — 84134 ASSAY OF PREALBUMIN: CPT

## 2020-01-01 PROCEDURE — 99291 CRITICAL CARE FIRST HOUR: CPT | Performed by: INTERNAL MEDICINE

## 2020-01-01 PROCEDURE — 71045 X-RAY EXAM CHEST 1 VIEW: CPT

## 2020-01-01 PROCEDURE — 700111 HCHG RX REV CODE 636 W/ 250 OVERRIDE (IP)

## 2020-01-01 PROCEDURE — 700101 HCHG RX REV CODE 250: Performed by: INTERNAL MEDICINE

## 2020-01-01 PROCEDURE — 85027 COMPLETE CBC AUTOMATED: CPT

## 2020-01-01 PROCEDURE — 85007 BL SMEAR W/DIFF WBC COUNT: CPT

## 2020-01-01 PROCEDURE — 84478 ASSAY OF TRIGLYCERIDES: CPT

## 2020-01-01 PROCEDURE — 85025 COMPLETE CBC W/AUTO DIFF WBC: CPT

## 2020-01-01 PROCEDURE — 99292 CRITICAL CARE ADDL 30 MIN: CPT | Performed by: INTERNAL MEDICINE

## 2020-01-01 PROCEDURE — 84484 ASSAY OF TROPONIN QUANT: CPT

## 2020-01-01 PROCEDURE — 31500 INSERT EMERGENCY AIRWAY: CPT | Performed by: INTERNAL MEDICINE

## 2020-01-01 PROCEDURE — 302146: Performed by: INTERNAL MEDICINE

## 2020-01-01 PROCEDURE — A9270 NON-COVERED ITEM OR SERVICE: HCPCS | Performed by: INTERNAL MEDICINE

## 2020-01-01 PROCEDURE — 5A1945Z RESPIRATORY VENTILATION, 24-96 CONSECUTIVE HOURS: ICD-10-PCS | Performed by: INTERNAL MEDICINE

## 2020-01-01 PROCEDURE — 87040 BLOOD CULTURE FOR BACTERIA: CPT | Mod: 91

## 2020-01-01 PROCEDURE — 86140 C-REACTIVE PROTEIN: CPT

## 2020-01-01 PROCEDURE — 99214 OFFICE O/P EST MOD 30 MIN: CPT | Performed by: FAMILY MEDICINE

## 2020-01-01 PROCEDURE — 93005 ELECTROCARDIOGRAM TRACING: CPT | Performed by: EMERGENCY MEDICINE

## 2020-01-01 PROCEDURE — 84100 ASSAY OF PHOSPHORUS: CPT

## 2020-01-01 PROCEDURE — 700101 HCHG RX REV CODE 250

## 2020-01-01 PROCEDURE — 99291 CRITICAL CARE FIRST HOUR: CPT

## 2020-01-01 PROCEDURE — 84145 PROCALCITONIN (PCT): CPT

## 2020-01-01 PROCEDURE — 87150 DNA/RNA AMPLIFIED PROBE: CPT

## 2020-01-01 PROCEDURE — 4A133J1 MONITORING OF ARTERIAL PULSE, PERIPHERAL, PERCUTANEOUS APPROACH: ICD-10-PCS | Performed by: INTERNAL MEDICINE

## 2020-01-01 PROCEDURE — 99291 CRITICAL CARE FIRST HOUR: CPT | Mod: 25 | Performed by: INTERNAL MEDICINE

## 2020-01-01 PROCEDURE — 92950 HEART/LUNG RESUSCITATION CPR: CPT

## 2020-01-01 PROCEDURE — 4A133B1 MONITORING OF ARTERIAL PRESSURE, PERIPHERAL, PERCUTANEOUS APPROACH: ICD-10-PCS | Performed by: INTERNAL MEDICINE

## 2020-01-01 PROCEDURE — 87077 CULTURE AEROBIC IDENTIFY: CPT

## 2020-01-01 PROCEDURE — 96365 THER/PROPH/DIAG IV INF INIT: CPT

## 2020-01-01 PROCEDURE — B548ZZA ULTRASONOGRAPHY OF SUPERIOR VENA CAVA, GUIDANCE: ICD-10-PCS | Performed by: INTERNAL MEDICINE

## 2020-01-01 PROCEDURE — 83520 IMMUNOASSAY QUANT NOS NONAB: CPT

## 2020-01-01 PROCEDURE — 36620 INSERTION CATHETER ARTERY: CPT | Performed by: INTERNAL MEDICINE

## 2020-01-01 PROCEDURE — 5A2204Z RESTORATION OF CARDIAC RHYTHM, SINGLE: ICD-10-PCS | Performed by: INTERNAL MEDICINE

## 2020-01-01 PROCEDURE — 82533 TOTAL CORTISOL: CPT

## 2020-01-01 PROCEDURE — 94002 VENT MGMT INPAT INIT DAY: CPT

## 2020-01-01 PROCEDURE — 0BH17EZ INSERTION OF ENDOTRACHEAL AIRWAY INTO TRACHEA, VIA NATURAL OR ARTIFICIAL OPENING: ICD-10-PCS | Performed by: INTERNAL MEDICINE

## 2020-01-01 PROCEDURE — 85379 FIBRIN DEGRADATION QUANT: CPT

## 2020-01-01 PROCEDURE — 82803 BLOOD GASES ANY COMBINATION: CPT | Mod: 91

## 2020-01-01 PROCEDURE — 83880 ASSAY OF NATRIURETIC PEPTIDE: CPT

## 2020-01-01 PROCEDURE — 302136 NUTRITION PUMP: Performed by: INTERNAL MEDICINE

## 2020-01-01 PROCEDURE — 94640 AIRWAY INHALATION TREATMENT: CPT

## 2020-01-01 PROCEDURE — 99152 MOD SED SAME PHYS/QHP 5/>YRS: CPT | Performed by: INTERNAL MEDICINE

## 2020-01-01 PROCEDURE — 96367 TX/PROPH/DG ADDL SEQ IV INF: CPT

## 2020-01-01 PROCEDURE — 700105 HCHG RX REV CODE 258: Performed by: EMERGENCY MEDICINE

## 2020-01-01 PROCEDURE — 87040 BLOOD CULTURE FOR BACTERIA: CPT

## 2020-01-01 PROCEDURE — 36556 INSERT NON-TUNNEL CV CATH: CPT | Performed by: INTERNAL MEDICINE

## 2020-01-01 PROCEDURE — 92950 HEART/LUNG RESUSCITATION CPR: CPT | Performed by: INTERNAL MEDICINE

## 2020-01-01 PROCEDURE — 71046 X-RAY EXAM CHEST 2 VIEWS: CPT | Mod: TC | Performed by: FAMILY MEDICINE

## 2020-01-01 PROCEDURE — 02HV33Z INSERTION OF INFUSION DEVICE INTO SUPERIOR VENA CAVA, PERCUTANEOUS APPROACH: ICD-10-PCS | Performed by: INTERNAL MEDICINE

## 2020-01-01 PROCEDURE — 87641 MR-STAPH DNA AMP PROBE: CPT

## 2020-01-01 PROCEDURE — 700111 HCHG RX REV CODE 636 W/ 250 OVERRIDE (IP): Performed by: EMERGENCY MEDICINE

## 2020-01-01 PROCEDURE — 92960 CARDIOVERSION ELECTRIC EXT: CPT

## 2020-01-01 PROCEDURE — U0003 INFECTIOUS AGENT DETECTION BY NUCLEIC ACID (DNA OR RNA); SEVERE ACUTE RESPIRATORY SYNDROME CORONAVIRUS 2 (SARS-COV-2) (CORONAVIRUS DISEASE [COVID-19]), AMPLIFIED PROBE TECHNIQUE, MAKING USE OF HIGH THROUGHPUT TECHNOLOGIES AS DESCRIBED BY CMS-2020-01-R: HCPCS

## 2020-01-01 PROCEDURE — XW033E5 INTRODUCTION OF REMDESIVIR ANTI-INFECTIVE INTO PERIPHERAL VEIN, PERCUTANEOUS APPROACH, NEW TECHNOLOGY GROUP 5: ICD-10-PCS | Performed by: INTERNAL MEDICINE

## 2020-01-01 PROCEDURE — 96375 TX/PRO/DX INJ NEW DRUG ADDON: CPT

## 2020-01-01 PROCEDURE — 03HY32Z INSERTION OF MONITORING DEVICE INTO UPPER ARTERY, PERCUTANEOUS APPROACH: ICD-10-PCS | Performed by: INTERNAL MEDICINE

## 2020-01-01 PROCEDURE — 81001 URINALYSIS AUTO W/SCOPE: CPT

## 2020-01-01 PROCEDURE — 74018 RADEX ABDOMEN 1 VIEW: CPT

## 2020-01-01 PROCEDURE — 87086 URINE CULTURE/COLONY COUNT: CPT

## 2020-01-01 PROCEDURE — 36556 INSERT NON-TUNNEL CV CATH: CPT | Mod: LT | Performed by: INTERNAL MEDICINE

## 2020-01-01 PROCEDURE — 83605 ASSAY OF LACTIC ACID: CPT

## 2020-01-01 PROCEDURE — 36415 COLL VENOUS BLD VENIPUNCTURE: CPT

## 2020-01-01 RX ORDER — ONDANSETRON 4 MG/1
4 TABLET, ORALLY DISINTEGRATING ORAL EVERY 4 HOURS PRN
Status: DISCONTINUED | OUTPATIENT
Start: 2020-01-01 | End: 2020-01-01

## 2020-01-01 RX ORDER — ALPRAZOLAM 0.5 MG/1
0.5 TABLET ORAL 3 TIMES DAILY
Status: DISCONTINUED | OUTPATIENT
Start: 2020-01-01 | End: 2020-01-01

## 2020-01-01 RX ORDER — DEXMEDETOMIDINE HYDROCHLORIDE 4 UG/ML
0-1.5 INJECTION, SOLUTION INTRAVENOUS CONTINUOUS
Status: DISCONTINUED | OUTPATIENT
Start: 2020-01-01 | End: 2020-01-01

## 2020-01-01 RX ORDER — OXYCODONE HYDROCHLORIDE 5 MG/1
5-10 TABLET ORAL EVERY 4 HOURS PRN
Status: DISCONTINUED | OUTPATIENT
Start: 2020-01-01 | End: 2020-01-01 | Stop reason: HOSPADM

## 2020-01-01 RX ORDER — AMOXICILLIN 250 MG
2 CAPSULE ORAL 2 TIMES DAILY
Status: DISCONTINUED | OUTPATIENT
Start: 2020-01-01 | End: 2020-01-01 | Stop reason: HOSPADM

## 2020-01-01 RX ORDER — MIDAZOLAM HYDROCHLORIDE 1 MG/ML
2 INJECTION INTRAMUSCULAR; INTRAVENOUS
Status: DISCONTINUED | OUTPATIENT
Start: 2020-01-01 | End: 2020-01-01 | Stop reason: HOSPADM

## 2020-01-01 RX ORDER — SODIUM CHLORIDE 9 MG/ML
1000 INJECTION, SOLUTION INTRAVENOUS ONCE
Status: COMPLETED | OUTPATIENT
Start: 2020-01-01 | End: 2020-01-01

## 2020-01-01 RX ORDER — FUROSEMIDE 10 MG/ML
20 INJECTION INTRAMUSCULAR; INTRAVENOUS
Status: DISCONTINUED | OUTPATIENT
Start: 2020-01-01 | End: 2020-01-01

## 2020-01-01 RX ORDER — POTASSIUM CHLORIDE 20 MEQ/1
40 TABLET, EXTENDED RELEASE ORAL EVERY 6 HOURS
Status: COMPLETED | OUTPATIENT
Start: 2020-01-01 | End: 2020-01-01

## 2020-01-01 RX ORDER — BENZONATATE 200 MG/1
200 CAPSULE ORAL 3 TIMES DAILY PRN
Qty: 30 CAP | Refills: 0 | Status: SHIPPED | OUTPATIENT
Start: 2020-01-01

## 2020-01-01 RX ORDER — FUROSEMIDE 10 MG/ML
20 INJECTION INTRAMUSCULAR; INTRAVENOUS EVERY 12 HOURS
Status: DISCONTINUED | OUTPATIENT
Start: 2020-01-01 | End: 2020-01-01 | Stop reason: HOSPADM

## 2020-01-01 RX ORDER — MIDODRINE HYDROCHLORIDE 5 MG/1
15 TABLET ORAL
Status: DISCONTINUED | OUTPATIENT
Start: 2020-01-01 | End: 2020-01-01 | Stop reason: HOSPADM

## 2020-01-01 RX ORDER — VECURONIUM BROMIDE 1 MG/ML
10 INJECTION, POWDER, LYOPHILIZED, FOR SOLUTION INTRAVENOUS ONCE
Status: COMPLETED | OUTPATIENT
Start: 2020-01-01 | End: 2020-01-01

## 2020-01-01 RX ORDER — BENZONATATE 100 MG/1
200 CAPSULE ORAL 3 TIMES DAILY PRN
Refills: 0 | Status: DISCONTINUED | OUTPATIENT
Start: 2020-01-01 | End: 2020-01-01

## 2020-01-01 RX ORDER — MAGNESIUM SULFATE HEPTAHYDRATE 500 MG/ML
2 INJECTION, SOLUTION INTRAMUSCULAR; INTRAVENOUS ONCE
Status: COMPLETED | OUTPATIENT
Start: 2020-01-01 | End: 2020-01-01

## 2020-01-01 RX ORDER — OXYCODONE HYDROCHLORIDE 5 MG/1
5 TABLET ORAL
Status: DISCONTINUED | OUTPATIENT
Start: 2020-01-01 | End: 2020-01-01

## 2020-01-01 RX ORDER — ACETAMINOPHEN 325 MG/1
650 TABLET ORAL EVERY 6 HOURS PRN
Status: DISCONTINUED | OUTPATIENT
Start: 2020-01-01 | End: 2020-01-01

## 2020-01-01 RX ORDER — LACTULOSE 20 G/30ML
30 SOLUTION ORAL 3 TIMES DAILY PRN
Status: DISCONTINUED | OUTPATIENT
Start: 2020-01-01 | End: 2020-01-01 | Stop reason: HOSPADM

## 2020-01-01 RX ORDER — BISACODYL 10 MG
10 SUPPOSITORY, RECTAL RECTAL
Status: DISCONTINUED | OUTPATIENT
Start: 2020-01-01 | End: 2020-01-01

## 2020-01-01 RX ORDER — DEXAMETHASONE SODIUM PHOSPHATE 4 MG/ML
6 INJECTION, SOLUTION INTRA-ARTICULAR; INTRALESIONAL; INTRAMUSCULAR; INTRAVENOUS; SOFT TISSUE DAILY
Status: DISCONTINUED | OUTPATIENT
Start: 2020-01-01 | End: 2020-01-01

## 2020-01-01 RX ORDER — ETOMIDATE 2 MG/ML
30 INJECTION INTRAVENOUS ONCE
Status: COMPLETED | OUTPATIENT
Start: 2020-01-01 | End: 2020-01-01

## 2020-01-01 RX ORDER — VECURONIUM BROMIDE 1 MG/ML
10 INJECTION, POWDER, LYOPHILIZED, FOR SOLUTION INTRAVENOUS
Status: DISCONTINUED | OUTPATIENT
Start: 2020-01-01 | End: 2020-01-01

## 2020-01-01 RX ORDER — MIDODRINE HYDROCHLORIDE 5 MG/1
5 TABLET ORAL
Status: DISCONTINUED | OUTPATIENT
Start: 2020-01-01 | End: 2020-01-01

## 2020-01-01 RX ORDER — ALPRAZOLAM 0.25 MG/1
0.25 TABLET ORAL 4 TIMES DAILY PRN
Status: DISCONTINUED | OUTPATIENT
Start: 2020-01-01 | End: 2020-01-01

## 2020-01-01 RX ORDER — IPRATROPIUM BROMIDE AND ALBUTEROL SULFATE 2.5; .5 MG/3ML; MG/3ML
3 SOLUTION RESPIRATORY (INHALATION)
Status: DISCONTINUED | OUTPATIENT
Start: 2020-01-01 | End: 2020-01-01 | Stop reason: HOSPADM

## 2020-01-01 RX ORDER — PHENYLEPHRINE HYDROCHLORIDE 10 MG/ML
INJECTION, SOLUTION INTRAMUSCULAR; INTRAVENOUS; SUBCUTANEOUS
Status: ACTIVE
Start: 2020-01-01 | End: 2020-01-01

## 2020-01-01 RX ORDER — ROCURONIUM BROMIDE 10 MG/ML
50 INJECTION, SOLUTION INTRAVENOUS ONCE
Status: COMPLETED | OUTPATIENT
Start: 2020-01-01 | End: 2020-01-01

## 2020-01-01 RX ORDER — AMOXICILLIN 250 MG
2 CAPSULE ORAL 2 TIMES DAILY
Status: DISCONTINUED | OUTPATIENT
Start: 2020-01-01 | End: 2020-01-01

## 2020-01-01 RX ORDER — EPINEPHRINE HCL IN 0.9 % NACL 4MG/250ML
0-10 PLASTIC BAG, INJECTION (ML) INTRAVENOUS CONTINUOUS
Status: DISCONTINUED | OUTPATIENT
Start: 2020-01-01 | End: 2020-01-01 | Stop reason: HOSPADM

## 2020-01-01 RX ORDER — MIDODRINE HYDROCHLORIDE 5 MG/1
10 TABLET ORAL
Status: DISCONTINUED | OUTPATIENT
Start: 2020-01-01 | End: 2020-01-01

## 2020-01-01 RX ORDER — MIDAZOLAM HYDROCHLORIDE 1 MG/ML
4 INJECTION INTRAMUSCULAR; INTRAVENOUS ONCE
Status: COMPLETED | OUTPATIENT
Start: 2020-01-01 | End: 2020-01-01

## 2020-01-01 RX ORDER — VECURONIUM BROMIDE 1 MG/ML
10 INJECTION, POWDER, LYOPHILIZED, FOR SOLUTION INTRAVENOUS ONCE
Status: DISCONTINUED | OUTPATIENT
Start: 2020-01-01 | End: 2020-01-01

## 2020-01-01 RX ORDER — MIDAZOLAM HYDROCHLORIDE 1 MG/ML
5 INJECTION INTRAMUSCULAR; INTRAVENOUS ONCE
Status: COMPLETED | OUTPATIENT
Start: 2020-01-01 | End: 2020-01-01

## 2020-01-01 RX ORDER — AMIODARONE HYDROCHLORIDE 150 MG/3ML
150 INJECTION, SOLUTION INTRAVENOUS ONCE
Status: COMPLETED | OUTPATIENT
Start: 2020-01-01 | End: 2020-01-01

## 2020-01-01 RX ORDER — MIDAZOLAM HYDROCHLORIDE 1 MG/ML
INJECTION INTRAMUSCULAR; INTRAVENOUS
Status: DISCONTINUED
Start: 2020-01-01 | End: 2020-01-01 | Stop reason: HOSPADM

## 2020-01-01 RX ORDER — PROMETHAZINE HYDROCHLORIDE 25 MG/1
12.5-25 TABLET ORAL EVERY 4 HOURS PRN
Status: DISCONTINUED | OUTPATIENT
Start: 2020-01-01 | End: 2020-01-01 | Stop reason: HOSPADM

## 2020-01-01 RX ORDER — VECURONIUM BROMIDE 1 MG/ML
0.1 INJECTION, POWDER, LYOPHILIZED, FOR SOLUTION INTRAVENOUS
Status: DISCONTINUED | OUTPATIENT
Start: 2020-01-01 | End: 2020-01-01

## 2020-01-01 RX ORDER — FAMOTIDINE 20 MG/1
20 TABLET, FILM COATED ORAL EVERY 12 HOURS
Status: DISCONTINUED | OUTPATIENT
Start: 2020-01-01 | End: 2020-01-01 | Stop reason: HOSPADM

## 2020-01-01 RX ORDER — QUETIAPINE FUMARATE 25 MG/1
50 TABLET, FILM COATED ORAL EVERY 8 HOURS
Status: DISCONTINUED | OUTPATIENT
Start: 2020-01-01 | End: 2020-01-01

## 2020-01-01 RX ORDER — MAGNESIUM SULFATE HEPTAHYDRATE 40 MG/ML
2 INJECTION, SOLUTION INTRAVENOUS ONCE
Status: COMPLETED | OUTPATIENT
Start: 2020-01-01 | End: 2020-01-01

## 2020-01-01 RX ORDER — POTASSIUM CHLORIDE 20 MEQ/1
40 TABLET, EXTENDED RELEASE ORAL ONCE
Status: COMPLETED | OUTPATIENT
Start: 2020-01-01 | End: 2020-01-01

## 2020-01-01 RX ORDER — DOXYCYCLINE HYCLATE 100 MG
100 TABLET ORAL 2 TIMES DAILY
Qty: 14 TAB | Refills: 0 | Status: SHIPPED | OUTPATIENT
Start: 2020-01-01 | End: 2020-01-01

## 2020-01-01 RX ORDER — LACTULOSE 20 G/30ML
30 SOLUTION ORAL 3 TIMES DAILY
Status: DISCONTINUED | OUTPATIENT
Start: 2020-01-01 | End: 2020-01-01

## 2020-01-01 RX ORDER — NOREPINEPHRINE BITARTRATE 0.03 MG/ML
0-30 INJECTION, SOLUTION INTRAVENOUS CONTINUOUS
Status: DISCONTINUED | OUTPATIENT
Start: 2020-01-01 | End: 2020-01-01 | Stop reason: HOSPADM

## 2020-01-01 RX ORDER — ROCURONIUM BROMIDE 10 MG/ML
100 INJECTION, SOLUTION INTRAVENOUS ONCE
Status: COMPLETED | OUTPATIENT
Start: 2020-01-01 | End: 2020-01-01

## 2020-01-01 RX ORDER — NOREPINEPHRINE BITARTRATE 0.03 MG/ML
0-30 INJECTION, SOLUTION INTRAVENOUS CONTINUOUS
Status: DISCONTINUED | OUTPATIENT
Start: 2020-01-01 | End: 2020-01-01

## 2020-01-01 RX ORDER — LACTULOSE 20 G/30ML
30 SOLUTION ORAL 3 TIMES DAILY PRN
Status: DISCONTINUED | OUTPATIENT
Start: 2020-01-01 | End: 2020-01-01

## 2020-01-01 RX ORDER — PROCHLORPERAZINE EDISYLATE 5 MG/ML
5-10 INJECTION INTRAMUSCULAR; INTRAVENOUS EVERY 4 HOURS PRN
Status: DISCONTINUED | OUTPATIENT
Start: 2020-01-01 | End: 2020-01-01 | Stop reason: HOSPADM

## 2020-01-01 RX ORDER — MIDAZOLAM HYDROCHLORIDE 1 MG/ML
10 INJECTION INTRAMUSCULAR; INTRAVENOUS ONCE
Status: COMPLETED | OUTPATIENT
Start: 2020-01-01 | End: 2020-01-01

## 2020-01-01 RX ORDER — POLYETHYLENE GLYCOL 3350 17 G/17G
1 POWDER, FOR SOLUTION ORAL
Status: DISCONTINUED | OUTPATIENT
Start: 2020-01-01 | End: 2020-01-01 | Stop reason: HOSPADM

## 2020-01-01 RX ORDER — PROMETHAZINE HYDROCHLORIDE 25 MG/1
12.5-25 TABLET ORAL EVERY 4 HOURS PRN
Status: DISCONTINUED | OUTPATIENT
Start: 2020-01-01 | End: 2020-01-01

## 2020-01-01 RX ORDER — BISACODYL 10 MG
10 SUPPOSITORY, RECTAL RECTAL
Status: DISCONTINUED | OUTPATIENT
Start: 2020-01-01 | End: 2020-01-01 | Stop reason: HOSPADM

## 2020-01-01 RX ORDER — SODIUM CHLORIDE 9 MG/ML
500 INJECTION, SOLUTION INTRAVENOUS ONCE
Status: COMPLETED | OUTPATIENT
Start: 2020-01-01 | End: 2020-01-01

## 2020-01-01 RX ORDER — FUROSEMIDE 10 MG/ML
INJECTION INTRAMUSCULAR; INTRAVENOUS
Status: ACTIVE
Start: 2020-01-01 | End: 2020-01-01

## 2020-01-01 RX ORDER — DEXMEDETOMIDINE HYDROCHLORIDE 4 UG/ML
.1-1.5 INJECTION, SOLUTION INTRAVENOUS CONTINUOUS
Status: DISCONTINUED | OUTPATIENT
Start: 2020-01-01 | End: 2020-01-01

## 2020-01-01 RX ORDER — MORPHINE SULFATE 4 MG/ML
2 INJECTION, SOLUTION INTRAMUSCULAR; INTRAVENOUS
Status: DISCONTINUED | OUTPATIENT
Start: 2020-01-01 | End: 2020-01-01

## 2020-01-01 RX ORDER — ROCURONIUM BROMIDE 10 MG/ML
100 INJECTION, SOLUTION INTRAVENOUS ONCE
Status: DISCONTINUED | OUTPATIENT
Start: 2020-01-01 | End: 2020-01-01

## 2020-01-01 RX ORDER — VECURONIUM BROMIDE 1 MG/ML
0.1 INJECTION, POWDER, LYOPHILIZED, FOR SOLUTION INTRAVENOUS ONCE
Status: DISCONTINUED | OUTPATIENT
Start: 2020-01-01 | End: 2020-01-01

## 2020-01-01 RX ORDER — DEXTROSE MONOHYDRATE 25 G/50ML
50 INJECTION, SOLUTION INTRAVENOUS
Status: DISCONTINUED | OUTPATIENT
Start: 2020-01-01 | End: 2020-01-01

## 2020-01-01 RX ORDER — OXYCODONE HYDROCHLORIDE 5 MG/1
2.5 TABLET ORAL
Status: DISCONTINUED | OUTPATIENT
Start: 2020-01-01 | End: 2020-01-01

## 2020-01-01 RX ORDER — PHENYLEPHRINE HCL IN 0.9% NACL 0.5 MG/5ML
100 SYRINGE (ML) INTRAVENOUS
Status: ACTIVE | OUTPATIENT
Start: 2020-01-01 | End: 2020-01-01

## 2020-01-01 RX ORDER — DEXAMETHASONE SODIUM PHOSPHATE 4 MG/ML
6 INJECTION, SOLUTION INTRA-ARTICULAR; INTRALESIONAL; INTRAMUSCULAR; INTRAVENOUS; SOFT TISSUE ONCE
Status: COMPLETED | OUTPATIENT
Start: 2020-01-01 | End: 2020-01-01

## 2020-01-01 RX ORDER — ONDANSETRON 2 MG/ML
4 INJECTION INTRAMUSCULAR; INTRAVENOUS EVERY 4 HOURS PRN
Status: DISCONTINUED | OUTPATIENT
Start: 2020-01-01 | End: 2020-01-01

## 2020-01-01 RX ORDER — PROMETHAZINE HYDROCHLORIDE 25 MG/1
12.5-25 SUPPOSITORY RECTAL EVERY 4 HOURS PRN
Status: DISCONTINUED | OUTPATIENT
Start: 2020-01-01 | End: 2020-01-01 | Stop reason: HOSPADM

## 2020-01-01 RX ORDER — MIDAZOLAM HYDROCHLORIDE 1 MG/ML
2-5 INJECTION INTRAMUSCULAR; INTRAVENOUS
Status: DISCONTINUED | OUTPATIENT
Start: 2020-01-01 | End: 2020-01-01

## 2020-01-01 RX ORDER — ACETAMINOPHEN 325 MG/1
650 TABLET ORAL EVERY 6 HOURS
Status: DISCONTINUED | OUTPATIENT
Start: 2020-01-01 | End: 2020-01-01 | Stop reason: HOSPADM

## 2020-01-01 RX ORDER — QUETIAPINE FUMARATE 100 MG/1
100 TABLET, FILM COATED ORAL EVERY 8 HOURS
Status: DISCONTINUED | OUTPATIENT
Start: 2020-01-01 | End: 2020-01-01

## 2020-01-01 RX ORDER — DIGOXIN 0.25 MG/ML
500 INJECTION INTRAMUSCULAR; INTRAVENOUS ONCE
Status: COMPLETED | OUTPATIENT
Start: 2020-01-01 | End: 2020-01-01

## 2020-01-01 RX ORDER — ONDANSETRON 2 MG/ML
4 INJECTION INTRAMUSCULAR; INTRAVENOUS EVERY 6 HOURS PRN
Status: DISCONTINUED | OUTPATIENT
Start: 2020-01-01 | End: 2020-01-01 | Stop reason: HOSPADM

## 2020-01-01 RX ORDER — VECURONIUM BROMIDE 1 MG/ML
10 INJECTION, POWDER, LYOPHILIZED, FOR SOLUTION INTRAVENOUS ONCE
Status: ACTIVE | OUTPATIENT
Start: 2020-01-01 | End: 2020-01-01

## 2020-01-01 RX ORDER — ROCURONIUM BROMIDE 10 MG/ML
INJECTION, SOLUTION INTRAVENOUS
Status: COMPLETED
Start: 2020-01-01 | End: 2020-01-01

## 2020-01-01 RX ORDER — AMIODARONE HYDROCHLORIDE 200 MG/1
400 TABLET ORAL TWICE DAILY
Status: DISCONTINUED | OUTPATIENT
Start: 2020-01-01 | End: 2020-01-01 | Stop reason: HOSPADM

## 2020-01-01 RX ORDER — NOREPINEPHRINE BITARTRATE 0.03 MG/ML
INJECTION, SOLUTION INTRAVENOUS
Status: DISCONTINUED
Start: 2020-01-01 | End: 2020-01-01

## 2020-01-01 RX ORDER — POLYETHYLENE GLYCOL 3350 17 G/17G
1 POWDER, FOR SOLUTION ORAL
Status: DISCONTINUED | OUTPATIENT
Start: 2020-01-01 | End: 2020-01-01

## 2020-01-01 RX ORDER — DIGOXIN 0.25 MG/ML
INJECTION INTRAMUSCULAR; INTRAVENOUS
Status: COMPLETED
Start: 2020-01-01 | End: 2020-01-01

## 2020-01-01 RX ORDER — ONDANSETRON 4 MG/1
4 TABLET, ORALLY DISINTEGRATING ORAL EVERY 6 HOURS PRN
Status: DISCONTINUED | OUTPATIENT
Start: 2020-01-01 | End: 2020-01-01 | Stop reason: HOSPADM

## 2020-01-01 RX ORDER — ONDANSETRON 4 MG/1
4 TABLET, ORALLY DISINTEGRATING ORAL EVERY 6 HOURS PRN
Status: DISCONTINUED | OUTPATIENT
Start: 2020-01-01 | End: 2020-01-01

## 2020-01-01 RX ADMIN — FAMOTIDINE 20 MG: 20 TABLET, FILM COATED ORAL at 17:03

## 2020-01-01 RX ADMIN — FUROSEMIDE 20 MG: 20 INJECTION, SOLUTION INTRAMUSCULAR; INTRAVENOUS at 16:55

## 2020-01-01 RX ADMIN — INSULIN HUMAN 1 UNITS: 100 INJECTION, SOLUTION PARENTERAL at 11:54

## 2020-01-01 RX ADMIN — AMIODARONE HYDROCHLORIDE 0.5 MG/MIN: 1.8 INJECTION, SOLUTION INTRAVENOUS at 04:47

## 2020-01-01 RX ADMIN — FENTANYL CITRATE 200 MCG: 50 INJECTION, SOLUTION INTRAMUSCULAR; INTRAVENOUS at 13:16

## 2020-01-01 RX ADMIN — POTASSIUM CHLORIDE 40 MEQ: 1500 TABLET, EXTENDED RELEASE ORAL at 18:22

## 2020-01-01 RX ADMIN — PHENYLEPHRINE HYDROCHLORIDE 190 MCG/MIN: 10 INJECTION INTRAVENOUS at 06:08

## 2020-01-01 RX ADMIN — Medication 300 MCG: at 02:21

## 2020-01-01 RX ADMIN — ACETAMINOPHEN 650 MG: 325 TABLET, FILM COATED ORAL at 00:04

## 2020-01-01 RX ADMIN — PROPOFOL 60 MCG/KG/MIN: 10 INJECTION, EMULSION INTRAVENOUS at 01:58

## 2020-01-01 RX ADMIN — MAGNESIUM HYDROXIDE 30 ML: 400 SUSPENSION ORAL at 16:54

## 2020-01-01 RX ADMIN — PROPOFOL 70 MCG/KG/MIN: 10 INJECTION, EMULSION INTRAVENOUS at 01:45

## 2020-01-01 RX ADMIN — ACETAMINOPHEN 650 MG: 325 TABLET, FILM COATED ORAL at 11:32

## 2020-01-01 RX ADMIN — PROPOFOL 70 MCG/KG/MIN: 10 INJECTION, EMULSION INTRAVENOUS at 07:59

## 2020-01-01 RX ADMIN — ACETAMINOPHEN 650 MG: 325 TABLET, FILM COATED ORAL at 08:53

## 2020-01-01 RX ADMIN — PHENYLEPHRINE HYDROCHLORIDE 140 MCG/MIN: 10 INJECTION INTRAVENOUS at 22:24

## 2020-01-01 RX ADMIN — PROPOFOL 80 MCG/KG/MIN: 10 INJECTION, EMULSION INTRAVENOUS at 04:59

## 2020-01-01 RX ADMIN — PROPOFOL 80 MCG/KG/MIN: 10 INJECTION, EMULSION INTRAVENOUS at 14:27

## 2020-01-01 RX ADMIN — LACTULOSE 30 ML: 20 SOLUTION ORAL at 05:36

## 2020-01-01 RX ADMIN — QUETIAPINE FUMARATE 100 MG: 100 TABLET ORAL at 15:06

## 2020-01-01 RX ADMIN — Medication 500 MCG/HR: at 01:51

## 2020-01-01 RX ADMIN — POTASSIUM BICARBONATE 25 MEQ: 978 TABLET, EFFERVESCENT ORAL at 10:29

## 2020-01-01 RX ADMIN — MINERAL OIL, PETROLATUM 1 APPLICATION: 425; 573 OINTMENT OPHTHALMIC at 21:01

## 2020-01-01 RX ADMIN — PROPOFOL 70 MCG/KG/MIN: 10 INJECTION, EMULSION INTRAVENOUS at 16:18

## 2020-01-01 RX ADMIN — DEXAMETHASONE SODIUM PHOSPHATE 6 MG: 4 INJECTION, SOLUTION INTRA-ARTICULAR; INTRALESIONAL; INTRAMUSCULAR; INTRAVENOUS; SOFT TISSUE at 05:55

## 2020-01-01 RX ADMIN — CEFEPIME 2 G: 2 INJECTION, POWDER, FOR SOLUTION INTRAVENOUS at 17:37

## 2020-01-01 RX ADMIN — ACETAMINOPHEN 650 MG: 325 TABLET, FILM COATED ORAL at 04:23

## 2020-01-01 RX ADMIN — MIDAZOLAM 15 MG/HR: 5 INJECTION INTRAMUSCULAR; INTRAVENOUS at 12:03

## 2020-01-01 RX ADMIN — PROPOFOL 70 MCG/KG/MIN: 10 INJECTION, EMULSION INTRAVENOUS at 06:20

## 2020-01-01 RX ADMIN — PROPOFOL 70 MCG/KG/MIN: 10 INJECTION, EMULSION INTRAVENOUS at 22:16

## 2020-01-01 RX ADMIN — FENTANYL CITRATE 200 MCG: 50 INJECTION, SOLUTION INTRAMUSCULAR; INTRAVENOUS at 11:51

## 2020-01-01 RX ADMIN — ENOXAPARIN SODIUM 40 MG: 40 INJECTION SUBCUTANEOUS at 05:51

## 2020-01-01 RX ADMIN — Medication 500 MCG/HR: at 06:21

## 2020-01-01 RX ADMIN — FAMOTIDINE 20 MG: 20 TABLET, FILM COATED ORAL at 17:44

## 2020-01-01 RX ADMIN — ALPRAZOLAM 0.5 MG: 0.5 TABLET ORAL at 16:54

## 2020-01-01 RX ADMIN — MINERAL OIL, PETROLATUM 1 APPLICATION: 425; 573 OINTMENT OPHTHALMIC at 06:00

## 2020-01-01 RX ADMIN — POTASSIUM BICARBONATE 25 MEQ: 978 TABLET, EFFERVESCENT ORAL at 08:18

## 2020-01-01 RX ADMIN — PROPOFOL 80 MCG/KG/MIN: 10 INJECTION, EMULSION INTRAVENOUS at 23:14

## 2020-01-01 RX ADMIN — Medication 300 MCG/HR: at 16:05

## 2020-01-01 RX ADMIN — METHYLNALTREXONE BROMIDE 12 MG: 12 INJECTION, SOLUTION SUBCUTANEOUS at 11:00

## 2020-01-01 RX ADMIN — ACETAMINOPHEN 650 MG: 325 TABLET, FILM COATED ORAL at 23:04

## 2020-01-01 RX ADMIN — DOCUSATE SODIUM 50 MG AND SENNOSIDES 8.6 MG 2 TABLET: 8.6; 5 TABLET, FILM COATED ORAL at 05:14

## 2020-01-01 RX ADMIN — ACETAMINOPHEN 650 MG: 325 TABLET, FILM COATED ORAL at 23:24

## 2020-01-01 RX ADMIN — PROPOFOL 70 MCG/KG/MIN: 10 INJECTION, EMULSION INTRAVENOUS at 23:17

## 2020-01-01 RX ADMIN — ACETAMINOPHEN 650 MG: 325 TABLET, FILM COATED ORAL at 13:09

## 2020-01-01 RX ADMIN — FAMOTIDINE 20 MG: 10 INJECTION INTRAVENOUS at 05:16

## 2020-01-01 RX ADMIN — PROPOFOL 60 MCG/KG/MIN: 10 INJECTION, EMULSION INTRAVENOUS at 12:54

## 2020-01-01 RX ADMIN — Medication 1 EACH: at 15:58

## 2020-01-01 RX ADMIN — PROPOFOL 80 MCG/KG/MIN: 10 INJECTION, EMULSION INTRAVENOUS at 08:31

## 2020-01-01 RX ADMIN — LACTULOSE 30 ML: 20 SOLUTION ORAL at 17:51

## 2020-01-01 RX ADMIN — AMIODARONE HYDROCHLORIDE 0.5 MG/MIN: 1.8 INJECTION, SOLUTION INTRAVENOUS at 06:45

## 2020-01-01 RX ADMIN — MIDAZOLAM HYDROCHLORIDE 2 MG: 1 INJECTION, SOLUTION INTRAMUSCULAR; INTRAVENOUS at 23:03

## 2020-01-01 RX ADMIN — AMIODARONE HYDROCHLORIDE 0.5 MG/MIN: 1.8 INJECTION, SOLUTION INTRAVENOUS at 16:55

## 2020-01-01 RX ADMIN — AMIODARONE HYDROCHLORIDE 150 MG: 50 INJECTION, SOLUTION INTRAVENOUS at 08:06

## 2020-01-01 RX ADMIN — FENTANYL CITRATE 200 MCG: 50 INJECTION, SOLUTION INTRAMUSCULAR; INTRAVENOUS at 00:41

## 2020-01-01 RX ADMIN — Medication 400 MCG/HR: at 06:47

## 2020-01-01 RX ADMIN — FENTANYL CITRATE 100 MCG: 50 INJECTION, SOLUTION INTRAMUSCULAR; INTRAVENOUS at 20:08

## 2020-01-01 RX ADMIN — POLYETHYLENE GLYCOL 3350 1 PACKET: 17 POWDER, FOR SOLUTION ORAL at 18:07

## 2020-01-01 RX ADMIN — FENTANYL CITRATE 100 MCG: 50 INJECTION, SOLUTION INTRAMUSCULAR; INTRAVENOUS at 19:10

## 2020-01-01 RX ADMIN — FUROSEMIDE 20 MG: 10 INJECTION, SOLUTION INTRAVENOUS at 17:28

## 2020-01-01 RX ADMIN — QUETIAPINE FUMARATE 50 MG: 25 TABLET ORAL at 21:39

## 2020-01-01 RX ADMIN — ACETAMINOPHEN 650 MG: 325 TABLET, FILM COATED ORAL at 17:50

## 2020-01-01 RX ADMIN — VECURONIUM BROMIDE 10 MG: 10 INJECTION, POWDER, LYOPHILIZED, FOR SOLUTION INTRAVENOUS at 08:43

## 2020-01-01 RX ADMIN — FAMOTIDINE 20 MG: 10 INJECTION INTRAVENOUS at 18:36

## 2020-01-01 RX ADMIN — NOREPINEPHRINE BITARTRATE 3 MCG/MIN: 1 INJECTION INTRAVENOUS at 14:27

## 2020-01-01 RX ADMIN — ROCURONIUM BROMIDE 100 MG: 10 INJECTION, SOLUTION INTRAVENOUS at 18:35

## 2020-01-01 RX ADMIN — DOCUSATE SODIUM 50 MG AND SENNOSIDES 8.6 MG 2 TABLET: 8.6; 5 TABLET, FILM COATED ORAL at 06:24

## 2020-01-01 RX ADMIN — FENTANYL CITRATE 200 MCG: 50 INJECTION, SOLUTION INTRAMUSCULAR; INTRAVENOUS at 19:48

## 2020-01-01 RX ADMIN — VECURONIUM BROMIDE 10 MG: 10 INJECTION, POWDER, LYOPHILIZED, FOR SOLUTION INTRAVENOUS at 08:54

## 2020-01-01 RX ADMIN — VECURONIUM BROMIDE 1.2 MCG/KG/MIN: 1 INJECTION, POWDER, LYOPHILIZED, FOR SOLUTION INTRAVENOUS at 08:18

## 2020-01-01 RX ADMIN — PROPOFOL 80 MCG/KG/MIN: 10 INJECTION, EMULSION INTRAVENOUS at 21:00

## 2020-01-01 RX ADMIN — FENTANYL CITRATE 200 MCG: 50 INJECTION, SOLUTION INTRAMUSCULAR; INTRAVENOUS at 14:14

## 2020-01-01 RX ADMIN — POTASSIUM PHOSPHATE, MONOBASIC AND POTASSIUM PHOSPHATE, DIBASIC 30 MMOL: 224; 236 INJECTION, SOLUTION, CONCENTRATE INTRAVENOUS at 08:33

## 2020-01-01 RX ADMIN — ENOXAPARIN SODIUM 100 MG: 100 INJECTION SUBCUTANEOUS at 17:51

## 2020-01-01 RX ADMIN — MIDAZOLAM HYDROCHLORIDE 10 MG: 1 INJECTION, SOLUTION INTRAMUSCULAR; INTRAVENOUS at 11:56

## 2020-01-01 RX ADMIN — PROPOFOL 60 MCG/KG/MIN: 10 INJECTION, EMULSION INTRAVENOUS at 22:24

## 2020-01-01 RX ADMIN — QUETIAPINE FUMARATE 100 MG: 100 TABLET ORAL at 05:14

## 2020-01-01 RX ADMIN — PROPOFOL 60 MCG/KG/MIN: 10 INJECTION, EMULSION INTRAVENOUS at 07:44

## 2020-01-01 RX ADMIN — MIDODRINE HYDROCHLORIDE 15 MG: 5 TABLET ORAL at 17:50

## 2020-01-01 RX ADMIN — ACETAMINOPHEN 650 MG: 325 TABLET, FILM COATED ORAL at 01:19

## 2020-01-01 RX ADMIN — FAMOTIDINE 20 MG: 20 TABLET, FILM COATED ORAL at 06:02

## 2020-01-01 RX ADMIN — PROPOFOL 70 MCG/KG/MIN: 10 INJECTION, EMULSION INTRAVENOUS at 11:11

## 2020-01-01 RX ADMIN — AMIODARONE HYDROCHLORIDE 400 MG: 200 TABLET ORAL at 17:44

## 2020-01-01 RX ADMIN — MIDODRINE HYDROCHLORIDE 15 MG: 5 TABLET ORAL at 17:37

## 2020-01-01 RX ADMIN — NOREPINEPHRINE BITARTRATE 8 MCG/MIN: 1 INJECTION INTRAVENOUS at 22:44

## 2020-01-01 RX ADMIN — PROPOFOL 60 MCG/KG/MIN: 10 INJECTION, EMULSION INTRAVENOUS at 19:33

## 2020-01-01 RX ADMIN — PROPOFOL 60 MCG/KG/MIN: 10 INJECTION, EMULSION INTRAVENOUS at 15:51

## 2020-01-01 RX ADMIN — INSULIN HUMAN 1 UNITS: 100 INJECTION, SOLUTION PARENTERAL at 00:50

## 2020-01-01 RX ADMIN — AMIODARONE HYDROCHLORIDE 400 MG: 200 TABLET ORAL at 15:46

## 2020-01-01 RX ADMIN — DOCUSATE SODIUM 50 MG AND SENNOSIDES 8.6 MG 2 TABLET: 8.6; 5 TABLET, FILM COATED ORAL at 05:16

## 2020-01-01 RX ADMIN — PROPOFOL 80 MCG/KG/MIN: 10 INJECTION, EMULSION INTRAVENOUS at 18:26

## 2020-01-01 RX ADMIN — MIDAZOLAM 15 MG/HR: 5 INJECTION INTRAMUSCULAR; INTRAVENOUS at 23:50

## 2020-01-01 RX ADMIN — EPINEPHRINE 10 MCG/MIN: 1 INJECTION INTRAMUSCULAR; INTRAVENOUS; SUBCUTANEOUS at 20:05

## 2020-01-01 RX ADMIN — PHENYLEPHRINE HYDROCHLORIDE 220 MCG/MIN: 10 INJECTION INTRAVENOUS at 19:59

## 2020-01-01 RX ADMIN — Medication 300 MCG/HR: at 15:19

## 2020-01-01 RX ADMIN — ALPRAZOLAM 0.5 MG: 0.5 TABLET ORAL at 13:09

## 2020-01-01 RX ADMIN — FUROSEMIDE 20 MG: 20 INJECTION, SOLUTION INTRAMUSCULAR; INTRAVENOUS at 18:38

## 2020-01-01 RX ADMIN — PROPOFOL 70 MCG/KG/MIN: 10 INJECTION, EMULSION INTRAVENOUS at 00:36

## 2020-01-01 RX ADMIN — POTASSIUM BICARBONATE 25 MEQ: 978 TABLET, EFFERVESCENT ORAL at 07:34

## 2020-01-01 RX ADMIN — PROPOFOL 70 MCG/KG/MIN: 10 INJECTION, EMULSION INTRAVENOUS at 09:07

## 2020-01-01 RX ADMIN — MIDODRINE HYDROCHLORIDE 15 MG: 5 TABLET ORAL at 11:32

## 2020-01-01 RX ADMIN — Medication 400 MCG/HR: at 01:18

## 2020-01-01 RX ADMIN — MIDODRINE HYDROCHLORIDE 15 MG: 5 TABLET ORAL at 11:53

## 2020-01-01 RX ADMIN — FENTANYL CITRATE 200 MCG: 50 INJECTION, SOLUTION INTRAMUSCULAR; INTRAVENOUS at 11:47

## 2020-01-01 RX ADMIN — Medication 300 MCG/HR: at 11:44

## 2020-01-01 RX ADMIN — SODIUM CHLORIDE 500 ML: 9 INJECTION, SOLUTION INTRAVENOUS at 08:20

## 2020-01-01 RX ADMIN — Medication 100 MCG: at 13:51

## 2020-01-01 RX ADMIN — ACETAMINOPHEN 650 MG: 325 TABLET, FILM COATED ORAL at 11:13

## 2020-01-01 RX ADMIN — POTASSIUM BICARBONATE 50 MEQ: 978 TABLET, EFFERVESCENT ORAL at 07:56

## 2020-01-01 RX ADMIN — DOCUSATE SODIUM 50 MG AND SENNOSIDES 8.6 MG 2 TABLET: 8.6; 5 TABLET, FILM COATED ORAL at 05:59

## 2020-01-01 RX ADMIN — MIDODRINE HYDROCHLORIDE 15 MG: 5 TABLET ORAL at 07:47

## 2020-01-01 RX ADMIN — POTASSIUM BICARBONATE 25 MEQ: 978 TABLET, EFFERVESCENT ORAL at 14:00

## 2020-01-01 RX ADMIN — ACETAMINOPHEN 650 MG: 325 TABLET, FILM COATED ORAL at 11:59

## 2020-01-01 RX ADMIN — PROPOFOL 80 MCG/KG/MIN: 10 INJECTION, EMULSION INTRAVENOUS at 07:00

## 2020-01-01 RX ADMIN — INSULIN HUMAN 1 UNITS: 100 INJECTION, SOLUTION PARENTERAL at 12:12

## 2020-01-01 RX ADMIN — AMIODARONE HYDROCHLORIDE 400 MG: 200 TABLET ORAL at 04:49

## 2020-01-01 RX ADMIN — PROPOFOL 80 MCG/KG/MIN: 10 INJECTION, EMULSION INTRAVENOUS at 12:11

## 2020-01-01 RX ADMIN — OXYCODONE 5 MG: 5 TABLET ORAL at 08:38

## 2020-01-01 RX ADMIN — ACETAMINOPHEN 650 MG: 325 TABLET, FILM COATED ORAL at 22:35

## 2020-01-01 RX ADMIN — AMIODARONE HYDROCHLORIDE 400 MG: 200 TABLET ORAL at 17:51

## 2020-01-01 RX ADMIN — MINERAL OIL, PETROLATUM 1 APPLICATION: 425; 573 OINTMENT OPHTHALMIC at 21:02

## 2020-01-01 RX ADMIN — ENOXAPARIN SODIUM 40 MG: 40 INJECTION SUBCUTANEOUS at 06:00

## 2020-01-01 RX ADMIN — PROPOFOL 80 MCG/KG/MIN: 10 INJECTION, EMULSION INTRAVENOUS at 20:13

## 2020-01-01 RX ADMIN — PROPOFOL 80 MCG/KG/MIN: 10 INJECTION, EMULSION INTRAVENOUS at 12:47

## 2020-01-01 RX ADMIN — DOCUSATE SODIUM 50 MG AND SENNOSIDES 8.6 MG 2 TABLET: 8.6; 5 TABLET, FILM COATED ORAL at 17:38

## 2020-01-01 RX ADMIN — Medication 300 MCG: at 14:07

## 2020-01-01 RX ADMIN — PROPOFOL 60 MCG/KG/MIN: 10 INJECTION, EMULSION INTRAVENOUS at 01:48

## 2020-01-01 RX ADMIN — PHENYLEPHRINE HYDROCHLORIDE 100 MCG/MIN: 10 INJECTION INTRAVENOUS at 23:59

## 2020-01-01 RX ADMIN — VECURONIUM BROMIDE 0.8 MCG/KG/MIN: 1 INJECTION, POWDER, LYOPHILIZED, FOR SOLUTION INTRAVENOUS at 10:19

## 2020-01-01 RX ADMIN — Medication 350 MCG/HR: at 21:19

## 2020-01-01 RX ADMIN — MIDAZOLAM 10 MG/HR: 5 INJECTION INTRAMUSCULAR; INTRAVENOUS at 09:44

## 2020-01-01 RX ADMIN — PROPOFOL 80 MCG/KG/MIN: 10 INJECTION, EMULSION INTRAVENOUS at 18:27

## 2020-01-01 RX ADMIN — VANCOMYCIN HYDROCHLORIDE 1250 MG: 500 INJECTION, POWDER, LYOPHILIZED, FOR SOLUTION INTRAVENOUS at 19:50

## 2020-01-01 RX ADMIN — PROPOFOL 80 MCG/KG/MIN: 10 INJECTION, EMULSION INTRAVENOUS at 05:19

## 2020-01-01 RX ADMIN — DOCUSATE SODIUM 50 MG AND SENNOSIDES 8.6 MG 2 TABLET: 8.6; 5 TABLET, FILM COATED ORAL at 18:49

## 2020-01-01 RX ADMIN — NOREPINEPHRINE BITARTRATE 6 MCG/MIN: 1 INJECTION INTRAVENOUS at 14:05

## 2020-01-01 RX ADMIN — VANCOMYCIN HYDROCHLORIDE 1250 MG: 500 INJECTION, POWDER, LYOPHILIZED, FOR SOLUTION INTRAVENOUS at 10:28

## 2020-01-01 RX ADMIN — PROPOFOL 60 MCG/KG/MIN: 10 INJECTION, EMULSION INTRAVENOUS at 18:38

## 2020-01-01 RX ADMIN — FENTANYL CITRATE 200 MCG: 50 INJECTION, SOLUTION INTRAMUSCULAR; INTRAVENOUS at 17:39

## 2020-01-01 RX ADMIN — ACETAMINOPHEN 650 MG: 325 TABLET, FILM COATED ORAL at 05:34

## 2020-01-01 RX ADMIN — AMIODARONE HYDROCHLORIDE 1 MG/MIN: 1.8 INJECTION, SOLUTION INTRAVENOUS at 13:20

## 2020-01-01 RX ADMIN — ACETAMINOPHEN 650 MG: 325 TABLET, FILM COATED ORAL at 17:51

## 2020-01-01 RX ADMIN — MIDAZOLAM HYDROCHLORIDE 4 MG: 1 INJECTION, SOLUTION INTRAMUSCULAR; INTRAVENOUS at 21:33

## 2020-01-01 RX ADMIN — FUROSEMIDE 20 MG: 20 INJECTION, SOLUTION INTRAMUSCULAR; INTRAVENOUS at 17:51

## 2020-01-01 RX ADMIN — Medication 2500 MCG: at 05:24

## 2020-01-01 RX ADMIN — MINERAL OIL, PETROLATUM 1 APPLICATION: 425; 573 OINTMENT OPHTHALMIC at 13:39

## 2020-01-01 RX ADMIN — PROPOFOL 80 MCG/KG/MIN: 10 INJECTION, EMULSION INTRAVENOUS at 22:31

## 2020-01-01 RX ADMIN — ALPRAZOLAM 0.5 MG: 0.5 TABLET ORAL at 17:38

## 2020-01-01 RX ADMIN — MINERAL OIL, PETROLATUM 1 APPLICATION: 425; 573 OINTMENT OPHTHALMIC at 22:00

## 2020-01-01 RX ADMIN — MIDODRINE HYDROCHLORIDE 15 MG: 5 TABLET ORAL at 11:13

## 2020-01-01 RX ADMIN — ACETAMINOPHEN 650 MG: 325 TABLET, FILM COATED ORAL at 18:07

## 2020-01-01 RX ADMIN — BENZONATATE 200 MG: 100 CAPSULE ORAL at 18:28

## 2020-01-01 RX ADMIN — POLYETHYLENE GLYCOL 3350 1 PACKET: 17 POWDER, FOR SOLUTION ORAL at 17:50

## 2020-01-01 RX ADMIN — Medication 300 MCG/HR: at 08:05

## 2020-01-01 RX ADMIN — DEXMEDETOMIDINE HYDROCHLORIDE 1.5 MCG/KG/HR: 100 INJECTION, SOLUTION INTRAVENOUS at 14:02

## 2020-01-01 RX ADMIN — MIDAZOLAM 2 MG/HR: 5 INJECTION INTRAMUSCULAR; INTRAVENOUS at 20:40

## 2020-01-01 RX ADMIN — PROPOFOL 80 MCG/KG/MIN: 10 INJECTION, EMULSION INTRAVENOUS at 18:45

## 2020-01-01 RX ADMIN — PROPOFOL 60 MCG/KG/MIN: 10 INJECTION, EMULSION INTRAVENOUS at 01:06

## 2020-01-01 RX ADMIN — DOCUSATE SODIUM 50 MG AND SENNOSIDES 8.6 MG 2 TABLET: 8.6; 5 TABLET, FILM COATED ORAL at 04:48

## 2020-01-01 RX ADMIN — PROPOFOL 70 MCG/KG/MIN: 10 INJECTION, EMULSION INTRAVENOUS at 20:13

## 2020-01-01 RX ADMIN — NOREPINEPHRINE BITARTRATE 6 MCG/MIN: 1 INJECTION INTRAVENOUS at 13:41

## 2020-01-01 RX ADMIN — ENOXAPARIN SODIUM 100 MG: 100 INJECTION SUBCUTANEOUS at 18:07

## 2020-01-01 RX ADMIN — CEFEPIME 2 G: 2 INJECTION, POWDER, FOR SOLUTION INTRAVENOUS at 17:53

## 2020-01-01 RX ADMIN — DOCUSATE SODIUM 50 MG AND SENNOSIDES 8.6 MG 2 TABLET: 8.6; 5 TABLET, FILM COATED ORAL at 05:36

## 2020-01-01 RX ADMIN — DEXMEDETOMIDINE HYDROCHLORIDE 0.2 MCG/KG/HR: 100 INJECTION, SOLUTION INTRAVENOUS at 13:11

## 2020-01-01 RX ADMIN — Medication: at 22:31

## 2020-01-01 RX ADMIN — DEXAMETHASONE SODIUM PHOSPHATE 6 MG: 4 INJECTION, SOLUTION INTRA-ARTICULAR; INTRALESIONAL; INTRAMUSCULAR; INTRAVENOUS; SOFT TISSUE at 05:13

## 2020-01-01 RX ADMIN — QUETIAPINE FUMARATE 50 MG: 25 TABLET ORAL at 22:19

## 2020-01-01 RX ADMIN — Medication 2500 MCG: at 00:22

## 2020-01-01 RX ADMIN — FUROSEMIDE 20 MG: 20 INJECTION, SOLUTION INTRAMUSCULAR; INTRAVENOUS at 05:33

## 2020-01-01 RX ADMIN — Medication 300 MCG/HR: at 20:28

## 2020-01-01 RX ADMIN — MAGNESIUM SULFATE 2 G: 2 INJECTION INTRAVENOUS at 07:44

## 2020-01-01 RX ADMIN — FUROSEMIDE 20 MG: 10 INJECTION, SOLUTION INTRAVENOUS at 04:43

## 2020-01-01 RX ADMIN — INSULIN HUMAN 1 UNITS: 100 INJECTION, SOLUTION PARENTERAL at 00:19

## 2020-01-01 RX ADMIN — MINERAL OIL, PETROLATUM 1 APPLICATION: 425; 573 OINTMENT OPHTHALMIC at 21:46

## 2020-01-01 RX ADMIN — DOCUSATE SODIUM 50 MG AND SENNOSIDES 8.6 MG 2 TABLET: 8.6; 5 TABLET, FILM COATED ORAL at 17:03

## 2020-01-01 RX ADMIN — ENOXAPARIN SODIUM 40 MG: 40 INJECTION SUBCUTANEOUS at 05:14

## 2020-01-01 RX ADMIN — CEFEPIME 2 G: 2 INJECTION, POWDER, FOR SOLUTION INTRAVENOUS at 11:00

## 2020-01-01 RX ADMIN — PROPOFOL 70 MCG/KG/MIN: 10 INJECTION, EMULSION INTRAVENOUS at 03:39

## 2020-01-01 RX ADMIN — PROPOFOL 80 MCG/KG/MIN: 10 INJECTION, EMULSION INTRAVENOUS at 08:47

## 2020-01-01 RX ADMIN — MIDAZOLAM HYDROCHLORIDE 3 MG: 1 INJECTION, SOLUTION INTRAMUSCULAR; INTRAVENOUS at 06:01

## 2020-01-01 RX ADMIN — FAMOTIDINE 20 MG: 20 TABLET, FILM COATED ORAL at 04:48

## 2020-01-01 RX ADMIN — LACTULOSE 30 ML: 20 SOLUTION ORAL at 11:47

## 2020-01-01 RX ADMIN — FUROSEMIDE 20 MG: 10 INJECTION, SOLUTION INTRAVENOUS at 04:24

## 2020-01-01 RX ADMIN — ENOXAPARIN SODIUM 100 MG: 100 INJECTION SUBCUTANEOUS at 05:16

## 2020-01-01 RX ADMIN — PROPOFOL 60 MCG/KG/MIN: 10 INJECTION, EMULSION INTRAVENOUS at 10:23

## 2020-01-01 RX ADMIN — PROPOFOL 80 MCG/KG/MIN: 10 INJECTION, EMULSION INTRAVENOUS at 00:56

## 2020-01-01 RX ADMIN — PROPOFOL 70 MCG/KG/MIN: 10 INJECTION, EMULSION INTRAVENOUS at 08:36

## 2020-01-01 RX ADMIN — Medication 100 MCG/HR: at 21:49

## 2020-01-01 RX ADMIN — FUROSEMIDE 20 MG: 20 INJECTION, SOLUTION INTRAMUSCULAR; INTRAVENOUS at 04:49

## 2020-01-01 RX ADMIN — PROPOFOL 70 MCG/KG/MIN: 10 INJECTION, EMULSION INTRAVENOUS at 13:40

## 2020-01-01 RX ADMIN — NOREPINEPHRINE BITARTRATE 10 MCG/MIN: 1 INJECTION INTRAVENOUS at 22:30

## 2020-01-01 RX ADMIN — VECURONIUM BROMIDE 0.8 MCG/KG/MIN: 1 INJECTION, POWDER, LYOPHILIZED, FOR SOLUTION INTRAVENOUS at 17:26

## 2020-01-01 RX ADMIN — PROPOFOL 10 MCG/KG/MIN: 10 INJECTION, EMULSION INTRAVENOUS at 19:45

## 2020-01-01 RX ADMIN — DOCUSATE SODIUM 50 MG AND SENNOSIDES 8.6 MG 2 TABLET: 8.6; 5 TABLET, FILM COATED ORAL at 17:50

## 2020-01-01 RX ADMIN — DOCUSATE SODIUM 50 MG AND SENNOSIDES 8.6 MG 2 TABLET: 8.6; 5 TABLET, FILM COATED ORAL at 04:49

## 2020-01-01 RX ADMIN — PROPOFOL 55 MCG/KG/MIN: 10 INJECTION, EMULSION INTRAVENOUS at 01:27

## 2020-01-01 RX ADMIN — PROPOFOL 60 MCG/KG/MIN: 10 INJECTION, EMULSION INTRAVENOUS at 10:50

## 2020-01-01 RX ADMIN — ACETAMINOPHEN 650 MG: 325 TABLET, FILM COATED ORAL at 17:37

## 2020-01-01 RX ADMIN — PROPOFOL 80 MCG/KG/MIN: 10 INJECTION, EMULSION INTRAVENOUS at 22:19

## 2020-01-01 RX ADMIN — NOREPINEPHRINE BITARTRATE 2 MCG/MIN: 1 INJECTION, SOLUTION, CONCENTRATE INTRAVENOUS at 10:07

## 2020-01-01 RX ADMIN — QUETIAPINE FUMARATE 100 MG: 100 TABLET ORAL at 05:34

## 2020-01-01 RX ADMIN — Medication 2500 MCG: at 17:22

## 2020-01-01 RX ADMIN — PROPOFOL 60 MCG/KG/MIN: 10 INJECTION, EMULSION INTRAVENOUS at 06:07

## 2020-01-01 RX ADMIN — DEXMEDETOMIDINE HYDROCHLORIDE 1.5 MCG/KG/HR: 100 INJECTION, SOLUTION INTRAVENOUS at 16:59

## 2020-01-01 RX ADMIN — FAMOTIDINE 20 MG: 10 INJECTION INTRAVENOUS at 04:23

## 2020-01-01 RX ADMIN — MIDAZOLAM 15 MG/HR: 5 INJECTION INTRAMUSCULAR; INTRAVENOUS at 07:46

## 2020-01-01 RX ADMIN — REMDESIVIR 100 MG: 5 INJECTION INTRAVENOUS at 18:25

## 2020-01-01 RX ADMIN — PROPOFOL 80 MCG/KG/MIN: 10 INJECTION, EMULSION INTRAVENOUS at 16:46

## 2020-01-01 RX ADMIN — MIDAZOLAM HYDROCHLORIDE 4 MG: 1 INJECTION, SOLUTION INTRAMUSCULAR; INTRAVENOUS at 04:58

## 2020-01-01 RX ADMIN — AMIODARONE HYDROCHLORIDE 0.5 MG/MIN: 1.8 INJECTION, SOLUTION INTRAVENOUS at 17:55

## 2020-01-01 RX ADMIN — PROPOFOL 70 MCG/KG/MIN: 10 INJECTION, EMULSION INTRAVENOUS at 17:18

## 2020-01-01 RX ADMIN — DOCUSATE SODIUM 50 MG AND SENNOSIDES 8.6 MG 2 TABLET: 8.6; 5 TABLET, FILM COATED ORAL at 17:37

## 2020-01-01 RX ADMIN — FAMOTIDINE 20 MG: 20 TABLET, FILM COATED ORAL at 17:38

## 2020-01-01 RX ADMIN — ACETAMINOPHEN 650 MG: 325 TABLET, FILM COATED ORAL at 18:39

## 2020-01-01 RX ADMIN — MORPHINE SULFATE 2 MG: 4 INJECTION INTRAVENOUS at 17:28

## 2020-01-01 RX ADMIN — PROPOFOL 60 MCG/KG/MIN: 10 INJECTION, EMULSION INTRAVENOUS at 04:47

## 2020-01-01 RX ADMIN — Medication 500 MCG: at 03:20

## 2020-01-01 RX ADMIN — Medication 300 MCG: at 13:28

## 2020-01-01 RX ADMIN — ROCURONIUM BROMIDE 50 MG: 10 INJECTION, SOLUTION INTRAVENOUS at 09:20

## 2020-01-01 RX ADMIN — MIDAZOLAM 15 MG/HR: 5 INJECTION INTRAMUSCULAR; INTRAVENOUS at 00:59

## 2020-01-01 RX ADMIN — PROPOFOL 80 MCG/KG/MIN: 10 INJECTION, EMULSION INTRAVENOUS at 04:31

## 2020-01-01 RX ADMIN — Medication 200 MCG/HR: at 09:16

## 2020-01-01 RX ADMIN — Medication 400 MCG/HR: at 13:39

## 2020-01-01 RX ADMIN — PROPOFOL 60 MCG/KG/MIN: 10 INJECTION, EMULSION INTRAVENOUS at 07:15

## 2020-01-01 RX ADMIN — PROPOFOL 80 MCG/KG/MIN: 10 INJECTION, EMULSION INTRAVENOUS at 02:57

## 2020-01-01 RX ADMIN — PROPOFOL 70 MCG/KG/MIN: 10 INJECTION, EMULSION INTRAVENOUS at 14:44

## 2020-01-01 RX ADMIN — ENOXAPARIN SODIUM 40 MG: 40 INJECTION SUBCUTANEOUS at 07:34

## 2020-01-01 RX ADMIN — ACETAMINOPHEN 650 MG: 325 TABLET, FILM COATED ORAL at 18:12

## 2020-01-01 RX ADMIN — MINERAL OIL, PETROLATUM 1 APPLICATION: 425; 573 OINTMENT OPHTHALMIC at 13:41

## 2020-01-01 RX ADMIN — PROPOFOL 70 MCG/KG/MIN: 10 INJECTION, EMULSION INTRAVENOUS at 06:32

## 2020-01-01 RX ADMIN — CEFEPIME 2 G: 2 INJECTION, POWDER, FOR SOLUTION INTRAVENOUS at 18:39

## 2020-01-01 RX ADMIN — MINERAL OIL, PETROLATUM 1 APPLICATION: 425; 573 OINTMENT OPHTHALMIC at 13:28

## 2020-01-01 RX ADMIN — MIDODRINE HYDROCHLORIDE 15 MG: 5 TABLET ORAL at 11:30

## 2020-01-01 RX ADMIN — DOCUSATE SODIUM 50 MG AND SENNOSIDES 8.6 MG 2 TABLET: 8.6; 5 TABLET, FILM COATED ORAL at 17:35

## 2020-01-01 RX ADMIN — BISACODYL 10 MG: 10 SUPPOSITORY RECTAL at 23:24

## 2020-01-01 RX ADMIN — QUETIAPINE FUMARATE 100 MG: 100 TABLET ORAL at 04:49

## 2020-01-01 RX ADMIN — FENTANYL CITRATE 100 MCG: 50 INJECTION, SOLUTION INTRAMUSCULAR; INTRAVENOUS at 14:09

## 2020-01-01 RX ADMIN — ENOXAPARIN SODIUM 40 MG: 40 INJECTION SUBCUTANEOUS at 05:00

## 2020-01-01 RX ADMIN — ACETAMINOPHEN 650 MG: 325 TABLET, FILM COATED ORAL at 22:58

## 2020-01-01 RX ADMIN — ACETAMINOPHEN 650 MG: 325 TABLET, FILM COATED ORAL at 23:10

## 2020-01-01 RX ADMIN — Medication 200 MCG/HR: at 23:18

## 2020-01-01 RX ADMIN — DEXAMETHASONE SODIUM PHOSPHATE 6 MG: 4 INJECTION, SOLUTION INTRA-ARTICULAR; INTRALESIONAL; INTRAMUSCULAR; INTRAVENOUS; SOFT TISSUE at 06:17

## 2020-01-01 RX ADMIN — ACETAMINOPHEN 650 MG: 325 TABLET, FILM COATED ORAL at 04:48

## 2020-01-01 RX ADMIN — MINERAL OIL, PETROLATUM 1 APPLICATION: 425; 573 OINTMENT OPHTHALMIC at 14:05

## 2020-01-01 RX ADMIN — VECURONIUM BROMIDE 10 MG: 10 INJECTION, POWDER, LYOPHILIZED, FOR SOLUTION INTRAVENOUS at 13:28

## 2020-01-01 RX ADMIN — FUROSEMIDE 20 MG: 10 INJECTION, SOLUTION INTRAVENOUS at 06:18

## 2020-01-01 RX ADMIN — VECURONIUM BROMIDE 10 MG: 10 INJECTION, POWDER, LYOPHILIZED, FOR SOLUTION INTRAVENOUS at 20:15

## 2020-01-01 RX ADMIN — QUETIAPINE FUMARATE 100 MG: 100 TABLET ORAL at 04:23

## 2020-01-01 RX ADMIN — ENOXAPARIN SODIUM 100 MG: 100 INJECTION SUBCUTANEOUS at 18:00

## 2020-01-01 RX ADMIN — INSULIN HUMAN 1 UNITS: 100 INJECTION, SOLUTION PARENTERAL at 18:00

## 2020-01-01 RX ADMIN — POTASSIUM PHOSPHATE, MONOBASIC AND POTASSIUM PHOSPHATE, DIBASIC 15 MMOL: 224; 236 INJECTION, SOLUTION, CONCENTRATE INTRAVENOUS at 09:53

## 2020-01-01 RX ADMIN — POTASSIUM CHLORIDE 40 MEQ: 1500 TABLET, EXTENDED RELEASE ORAL at 10:59

## 2020-01-01 RX ADMIN — PROPOFOL 70 MCG/KG/MIN: 10 INJECTION, EMULSION INTRAVENOUS at 11:12

## 2020-01-01 RX ADMIN — REMDESIVIR 200 MG: 5 INJECTION INTRAVENOUS at 04:06

## 2020-01-01 RX ADMIN — PHENYLEPHRINE HYDROCHLORIDE 200 MCG/MIN: 10 INJECTION INTRAVENOUS at 13:55

## 2020-01-01 RX ADMIN — INSULIN HUMAN 1 UNITS: 100 INJECTION, SOLUTION PARENTERAL at 13:28

## 2020-01-01 RX ADMIN — FUROSEMIDE 20 MG: 10 INJECTION, SOLUTION INTRAVENOUS at 16:18

## 2020-01-01 RX ADMIN — Medication 400 MCG/HR: at 18:53

## 2020-01-01 RX ADMIN — MIDODRINE HYDROCHLORIDE 15 MG: 5 TABLET ORAL at 07:44

## 2020-01-01 RX ADMIN — MIDAZOLAM 15 MG/HR: 5 INJECTION INTRAMUSCULAR; INTRAVENOUS at 14:26

## 2020-01-01 RX ADMIN — DOCUSATE SODIUM 50 MG AND SENNOSIDES 8.6 MG 2 TABLET: 8.6; 5 TABLET, FILM COATED ORAL at 05:34

## 2020-01-01 RX ADMIN — Medication 150 MCG/HR: at 14:05

## 2020-01-01 RX ADMIN — MINERAL OIL, PETROLATUM 1 APPLICATION: 425; 573 OINTMENT OPHTHALMIC at 06:37

## 2020-01-01 RX ADMIN — MIDODRINE HYDROCHLORIDE 15 MG: 5 TABLET ORAL at 18:07

## 2020-01-01 RX ADMIN — MIDODRINE HYDROCHLORIDE 15 MG: 5 TABLET ORAL at 11:59

## 2020-01-01 RX ADMIN — PHENYLEPHRINE HYDROCHLORIDE 200 MCG/MIN: 10 INJECTION INTRAVENOUS at 01:58

## 2020-01-01 RX ADMIN — MAGNESIUM HYDROXIDE 30 ML: 400 SUSPENSION ORAL at 17:38

## 2020-01-01 RX ADMIN — LACTULOSE 30 ML: 20 SOLUTION ORAL at 12:35

## 2020-01-01 RX ADMIN — CEFEPIME 2 G: 2 INJECTION, POWDER, FOR SOLUTION INTRAVENOUS at 04:48

## 2020-01-01 RX ADMIN — MINERAL OIL, PETROLATUM 1 APPLICATION: 425; 573 OINTMENT OPHTHALMIC at 05:16

## 2020-01-01 RX ADMIN — FUROSEMIDE 20 MG: 10 INJECTION, SOLUTION INTRAMUSCULAR; INTRAVENOUS at 16:32

## 2020-01-01 RX ADMIN — PROPOFOL 80 MCG/KG/MIN: 10 INJECTION, EMULSION INTRAVENOUS at 12:27

## 2020-01-01 RX ADMIN — PROPOFOL 70 MCG/KG/MIN: 10 INJECTION, EMULSION INTRAVENOUS at 07:35

## 2020-01-01 RX ADMIN — DOCUSATE SODIUM 50 MG AND SENNOSIDES 8.6 MG 2 TABLET: 8.6; 5 TABLET, FILM COATED ORAL at 16:54

## 2020-01-01 RX ADMIN — FAMOTIDINE 20 MG: 20 TABLET, FILM COATED ORAL at 17:37

## 2020-01-01 RX ADMIN — DEXAMETHASONE SODIUM PHOSPHATE 6 MG: 4 INJECTION, SOLUTION INTRA-ARTICULAR; INTRALESIONAL; INTRAMUSCULAR; INTRAVENOUS; SOFT TISSUE at 04:31

## 2020-01-01 RX ADMIN — PROPOFOL 60 MCG/KG/MIN: 10 INJECTION, EMULSION INTRAVENOUS at 04:22

## 2020-01-01 RX ADMIN — MINERAL OIL, PETROLATUM 1 APPLICATION: 425; 573 OINTMENT OPHTHALMIC at 11:57

## 2020-01-01 RX ADMIN — Medication 200 MCG/HR: at 07:12

## 2020-01-01 RX ADMIN — FENTANYL CITRATE 200 MCG: 50 INJECTION, SOLUTION INTRAMUSCULAR; INTRAVENOUS at 01:25

## 2020-01-01 RX ADMIN — FENTANYL CITRATE 100 MCG: 50 INJECTION, SOLUTION INTRAMUSCULAR; INTRAVENOUS at 15:03

## 2020-01-01 RX ADMIN — PROPOFOL 80 MCG/KG/MIN: 10 INJECTION, EMULSION INTRAVENOUS at 00:39

## 2020-01-01 RX ADMIN — PROPOFOL 80 MCG/KG/MIN: 10 INJECTION, EMULSION INTRAVENOUS at 08:32

## 2020-01-01 RX ADMIN — MINERAL OIL, PETROLATUM 1 APPLICATION: 425; 573 OINTMENT OPHTHALMIC at 14:27

## 2020-01-01 RX ADMIN — MIDODRINE HYDROCHLORIDE 15 MG: 5 TABLET ORAL at 17:43

## 2020-01-01 RX ADMIN — POTASSIUM BICARBONATE 25 MEQ: 978 TABLET, EFFERVESCENT ORAL at 10:24

## 2020-01-01 RX ADMIN — MINERAL OIL, PETROLATUM 1 APPLICATION: 425; 573 OINTMENT OPHTHALMIC at 14:00

## 2020-01-01 RX ADMIN — Medication 400 MCG/HR: at 12:38

## 2020-01-01 RX ADMIN — MAGNESIUM HYDROXIDE 30 ML: 400 SUSPENSION ORAL at 17:51

## 2020-01-01 RX ADMIN — PROPOFOL 60 MCG/KG/MIN: 10 INJECTION, EMULSION INTRAVENOUS at 13:20

## 2020-01-01 RX ADMIN — ALPRAZOLAM 0.5 MG: 0.5 TABLET ORAL at 11:59

## 2020-01-01 RX ADMIN — FUROSEMIDE 20 MG: 10 INJECTION, SOLUTION INTRAMUSCULAR; INTRAVENOUS at 05:36

## 2020-01-01 RX ADMIN — VECURONIUM BROMIDE 1 MCG/KG/MIN: 1 INJECTION, POWDER, LYOPHILIZED, FOR SOLUTION INTRAVENOUS at 08:54

## 2020-01-01 RX ADMIN — PROPOFOL 60 MCG/KG/MIN: 10 INJECTION, EMULSION INTRAVENOUS at 21:18

## 2020-01-01 RX ADMIN — MIDAZOLAM HYDROCHLORIDE 5 MG: 1 INJECTION, SOLUTION INTRAMUSCULAR; INTRAVENOUS at 21:32

## 2020-01-01 RX ADMIN — PROPOFOL 80 MCG/KG/MIN: 10 INJECTION, EMULSION INTRAVENOUS at 19:32

## 2020-01-01 RX ADMIN — Medication 2500 MCG: at 16:19

## 2020-01-01 RX ADMIN — QUETIAPINE FUMARATE 100 MG: 100 TABLET ORAL at 22:34

## 2020-01-01 RX ADMIN — AMIODARONE HYDROCHLORIDE 400 MG: 200 TABLET ORAL at 05:29

## 2020-01-01 RX ADMIN — CEFEPIME 2 G: 2 INJECTION, POWDER, FOR SOLUTION INTRAVENOUS at 06:25

## 2020-01-01 RX ADMIN — DOCUSATE SODIUM 50 MG AND SENNOSIDES 8.6 MG 2 TABLET: 8.6; 5 TABLET, FILM COATED ORAL at 04:24

## 2020-01-01 RX ADMIN — PROPOFOL 80 MCG/KG/MIN: 10 INJECTION, EMULSION INTRAVENOUS at 00:55

## 2020-01-01 RX ADMIN — BISACODYL 10 MG: 10 SUPPOSITORY RECTAL at 03:50

## 2020-01-01 RX ADMIN — PROPOFOL 80 MCG/KG/MIN: 10 INJECTION, EMULSION INTRAVENOUS at 23:16

## 2020-01-01 RX ADMIN — POTASSIUM BICARBONATE 25 MEQ: 978 TABLET, EFFERVESCENT ORAL at 07:30

## 2020-01-01 RX ADMIN — Medication 2500 MCG: at 05:31

## 2020-01-01 RX ADMIN — PROPOFOL 80 MCG/KG/MIN: 10 INJECTION, EMULSION INTRAVENOUS at 17:51

## 2020-01-01 RX ADMIN — FAMOTIDINE 20 MG: 10 INJECTION INTRAVENOUS at 06:17

## 2020-01-01 RX ADMIN — FAMOTIDINE 20 MG: 10 INJECTION INTRAVENOUS at 05:14

## 2020-01-01 RX ADMIN — QUETIAPINE FUMARATE 100 MG: 100 TABLET ORAL at 21:52

## 2020-01-01 RX ADMIN — PROPOFOL 70 MCG/KG/MIN: 10 INJECTION, EMULSION INTRAVENOUS at 02:41

## 2020-01-01 RX ADMIN — Medication: at 06:40

## 2020-01-01 RX ADMIN — POTASSIUM BICARBONATE 25 MEQ: 978 TABLET, EFFERVESCENT ORAL at 17:37

## 2020-01-01 RX ADMIN — ENOXAPARIN SODIUM 40 MG: 40 INJECTION SUBCUTANEOUS at 05:13

## 2020-01-01 RX ADMIN — FAMOTIDINE 20 MG: 20 TABLET, FILM COATED ORAL at 05:34

## 2020-01-01 RX ADMIN — DOCUSATE SODIUM 50 MG AND SENNOSIDES 8.6 MG 2 TABLET: 8.6; 5 TABLET, FILM COATED ORAL at 04:59

## 2020-01-01 RX ADMIN — Medication 100 MCG/HR: at 11:23

## 2020-01-01 RX ADMIN — DEXAMETHASONE SODIUM PHOSPHATE 6 MG: 4 INJECTION, SOLUTION INTRA-ARTICULAR; INTRALESIONAL; INTRAMUSCULAR; INTRAVENOUS; SOFT TISSUE at 04:59

## 2020-01-01 RX ADMIN — FAMOTIDINE 20 MG: 20 TABLET, FILM COATED ORAL at 16:54

## 2020-01-01 RX ADMIN — Medication 500 MCG/HR: at 17:17

## 2020-01-01 RX ADMIN — METHYLNALTREXONE BROMIDE 12 MG: 12 INJECTION, SOLUTION SUBCUTANEOUS at 10:16

## 2020-01-01 RX ADMIN — QUETIAPINE FUMARATE 50 MG: 25 TABLET ORAL at 15:59

## 2020-01-01 RX ADMIN — FAMOTIDINE 20 MG: 20 TABLET, FILM COATED ORAL at 17:50

## 2020-01-01 RX ADMIN — CEFEPIME 2 G: 2 INJECTION, POWDER, FOR SOLUTION INTRAVENOUS at 17:19

## 2020-01-01 RX ADMIN — DIGOXIN 500 MCG: 0.25 INJECTION INTRAMUSCULAR; INTRAVENOUS at 08:20

## 2020-01-01 RX ADMIN — POLYETHYLENE GLYCOL 3350 1 PACKET: 17 POWDER, FOR SOLUTION ORAL at 10:54

## 2020-01-01 RX ADMIN — PHENYLEPHRINE HYDROCHLORIDE 240 MCG/MIN: 10 INJECTION INTRAVENOUS at 07:44

## 2020-01-01 RX ADMIN — MIDODRINE HYDROCHLORIDE 15 MG: 5 TABLET ORAL at 06:14

## 2020-01-01 RX ADMIN — BENZONATATE 200 MG: 100 CAPSULE ORAL at 15:20

## 2020-01-01 RX ADMIN — REMDESIVIR 100 MG: 5 INJECTION INTRAVENOUS at 22:06

## 2020-01-01 RX ADMIN — MINERAL OIL, PETROLATUM 1 APPLICATION: 425; 573 OINTMENT OPHTHALMIC at 05:01

## 2020-01-01 RX ADMIN — Medication 500 MCG/HR: at 12:27

## 2020-01-01 RX ADMIN — DOCUSATE SODIUM 50 MG AND SENNOSIDES 8.6 MG 2 TABLET: 8.6; 5 TABLET, FILM COATED ORAL at 17:22

## 2020-01-01 RX ADMIN — POTASSIUM BICARBONATE 50 MEQ: 978 TABLET, EFFERVESCENT ORAL at 08:47

## 2020-01-01 RX ADMIN — INSULIN HUMAN 1 UNITS: 100 INJECTION, SOLUTION PARENTERAL at 05:34

## 2020-01-01 RX ADMIN — FUROSEMIDE 20 MG: 10 INJECTION, SOLUTION INTRAVENOUS at 08:30

## 2020-01-01 RX ADMIN — Medication 200 MCG/HR: at 17:36

## 2020-01-01 RX ADMIN — FUROSEMIDE 20 MG: 20 INJECTION, SOLUTION INTRAMUSCULAR; INTRAVENOUS at 05:29

## 2020-01-01 RX ADMIN — FAMOTIDINE 20 MG: 20 TABLET, FILM COATED ORAL at 17:33

## 2020-01-01 RX ADMIN — PROPOFOL 60 MCG/KG/MIN: 10 INJECTION, EMULSION INTRAVENOUS at 13:17

## 2020-01-01 RX ADMIN — FAMOTIDINE 20 MG: 10 INJECTION INTRAVENOUS at 17:22

## 2020-01-01 RX ADMIN — PHENYLEPHRINE HYDROCHLORIDE 240 MCG/MIN: 10 INJECTION INTRAVENOUS at 03:32

## 2020-01-01 RX ADMIN — DEXMEDETOMIDINE HYDROCHLORIDE 0.2 MCG/KG/HR: 100 INJECTION, SOLUTION INTRAVENOUS at 10:48

## 2020-01-01 RX ADMIN — MIDAZOLAM HYDROCHLORIDE 3 MG: 1 INJECTION, SOLUTION INTRAMUSCULAR; INTRAVENOUS at 01:22

## 2020-01-01 RX ADMIN — FAMOTIDINE 20 MG: 20 TABLET, FILM COATED ORAL at 04:23

## 2020-01-01 RX ADMIN — MAGNESIUM SULFATE HEPTAHYDRATE 2 G: 500 INJECTION, SOLUTION INTRAMUSCULAR; INTRAVENOUS at 08:07

## 2020-01-01 RX ADMIN — PROPOFOL 80 MCG/KG/MIN: 10 INJECTION, EMULSION INTRAVENOUS at 01:09

## 2020-01-01 RX ADMIN — ENOXAPARIN SODIUM 100 MG: 100 INJECTION SUBCUTANEOUS at 05:29

## 2020-01-01 RX ADMIN — CEFEPIME 2 G: 2 INJECTION, POWDER, FOR SOLUTION INTRAVENOUS at 18:08

## 2020-01-01 RX ADMIN — CEFEPIME 2 G: 2 INJECTION, POWDER, FOR SOLUTION INTRAVENOUS at 04:22

## 2020-01-01 RX ADMIN — INSULIN HUMAN 1 UNITS: 100 INJECTION, SOLUTION PARENTERAL at 05:25

## 2020-01-01 RX ADMIN — Medication 300 MCG/HR: at 12:29

## 2020-01-01 RX ADMIN — ALPRAZOLAM 0.5 MG: 0.5 TABLET ORAL at 05:33

## 2020-01-01 RX ADMIN — ENOXAPARIN SODIUM 100 MG: 100 INJECTION SUBCUTANEOUS at 05:34

## 2020-01-01 RX ADMIN — FENTANYL CITRATE 200 MCG: 50 INJECTION, SOLUTION INTRAMUSCULAR; INTRAVENOUS at 18:00

## 2020-01-01 RX ADMIN — ACETAMINOPHEN 650 MG: 325 TABLET, FILM COATED ORAL at 18:25

## 2020-01-01 RX ADMIN — PROPOFOL 60 MCG/KG/MIN: 10 INJECTION, EMULSION INTRAVENOUS at 21:52

## 2020-01-01 RX ADMIN — MINERAL OIL, PETROLATUM 1 APPLICATION: 425; 573 OINTMENT OPHTHALMIC at 21:11

## 2020-01-01 RX ADMIN — OXYCODONE HYDROCHLORIDE 10 MG: 5 TABLET ORAL at 11:28

## 2020-01-01 RX ADMIN — QUETIAPINE FUMARATE 100 MG: 100 TABLET ORAL at 21:17

## 2020-01-01 RX ADMIN — ENOXAPARIN SODIUM 100 MG: 100 INJECTION SUBCUTANEOUS at 17:45

## 2020-01-01 RX ADMIN — ENOXAPARIN SODIUM 40 MG: 40 INJECTION SUBCUTANEOUS at 04:22

## 2020-01-01 RX ADMIN — AMIODARONE HYDROCHLORIDE 150 MG: 50 INJECTION, SOLUTION INTRAVENOUS at 08:00

## 2020-01-01 RX ADMIN — FAMOTIDINE 20 MG: 20 TABLET, FILM COATED ORAL at 18:38

## 2020-01-01 RX ADMIN — FAMOTIDINE 20 MG: 10 INJECTION INTRAVENOUS at 04:31

## 2020-01-01 RX ADMIN — PROPOFOL 80 MCG/KG/MIN: 10 INJECTION, EMULSION INTRAVENOUS at 16:39

## 2020-01-01 RX ADMIN — VANCOMYCIN HYDROCHLORIDE 1250 MG: 500 INJECTION, POWDER, LYOPHILIZED, FOR SOLUTION INTRAVENOUS at 17:57

## 2020-01-01 RX ADMIN — FAMOTIDINE 20 MG: 20 TABLET, FILM COATED ORAL at 04:49

## 2020-01-01 RX ADMIN — PROPOFOL 80 MCG/KG/MIN: 10 INJECTION, EMULSION INTRAVENOUS at 16:09

## 2020-01-01 RX ADMIN — PROPOFOL 70 MCG/KG/MIN: 10 INJECTION, EMULSION INTRAVENOUS at 13:28

## 2020-01-01 RX ADMIN — FENTANYL CITRATE 100 MCG: 50 INJECTION, SOLUTION INTRAMUSCULAR; INTRAVENOUS at 17:37

## 2020-01-01 RX ADMIN — FAMOTIDINE 20 MG: 20 TABLET, FILM COATED ORAL at 05:14

## 2020-01-01 RX ADMIN — QUETIAPINE FUMARATE 100 MG: 100 TABLET ORAL at 13:10

## 2020-01-01 RX ADMIN — ENOXAPARIN SODIUM 100 MG: 100 INJECTION SUBCUTANEOUS at 04:47

## 2020-01-01 RX ADMIN — PROPOFOL 80 MCG/KG/MIN: 10 INJECTION, EMULSION INTRAVENOUS at 15:33

## 2020-01-01 RX ADMIN — PROPOFOL 80 MCG/KG/MIN: 10 INJECTION, EMULSION INTRAVENOUS at 09:26

## 2020-01-01 RX ADMIN — FENTANYL CITRATE 200 MCG: 50 INJECTION, SOLUTION INTRAMUSCULAR; INTRAVENOUS at 20:15

## 2020-01-01 RX ADMIN — CEFEPIME 2 G: 2 INJECTION, POWDER, FOR SOLUTION INTRAVENOUS at 17:36

## 2020-01-01 RX ADMIN — MINERAL OIL, PETROLATUM 1 APPLICATION: 425; 573 OINTMENT OPHTHALMIC at 04:57

## 2020-01-01 RX ADMIN — NOREPINEPHRINE BITARTRATE 10 MCG/MIN: 1 INJECTION INTRAVENOUS at 23:17

## 2020-01-01 RX ADMIN — Medication 500 MCG/HR: at 22:11

## 2020-01-01 RX ADMIN — Medication 350 MCG/HR: at 05:06

## 2020-01-01 RX ADMIN — Medication 2500 MCG: at 14:28

## 2020-01-01 RX ADMIN — PROPOFOL 70 MCG/KG/MIN: 10 INJECTION, EMULSION INTRAVENOUS at 16:17

## 2020-01-01 RX ADMIN — ALPRAZOLAM 0.5 MG: 0.5 TABLET ORAL at 04:23

## 2020-01-01 RX ADMIN — BENZONATATE 100 MG: 100 CAPSULE ORAL at 08:56

## 2020-01-01 RX ADMIN — CEFEPIME 2 G: 2 INJECTION, POWDER, FOR SOLUTION INTRAVENOUS at 16:59

## 2020-01-01 RX ADMIN — POTASSIUM BICARBONATE 25 MEQ: 978 TABLET, EFFERVESCENT ORAL at 13:10

## 2020-01-01 RX ADMIN — BISACODYL 10 MG: 10 SUPPOSITORY RECTAL at 20:56

## 2020-01-01 RX ADMIN — DEXAMETHASONE SODIUM PHOSPHATE 6 MG: 4 INJECTION, SOLUTION INTRA-ARTICULAR; INTRALESIONAL; INTRAMUSCULAR; INTRAVENOUS; SOFT TISSUE at 05:16

## 2020-01-01 RX ADMIN — PROPOFOL 60 MCG/KG/MIN: 10 INJECTION, EMULSION INTRAVENOUS at 16:01

## 2020-01-01 RX ADMIN — PROPOFOL 80 MCG/KG/MIN: 10 INJECTION, EMULSION INTRAVENOUS at 02:43

## 2020-01-01 RX ADMIN — FUROSEMIDE 20 MG: 10 INJECTION, SOLUTION INTRAVENOUS at 08:18

## 2020-01-01 RX ADMIN — PROPOFOL 80 MCG/KG/MIN: 10 INJECTION, EMULSION INTRAVENOUS at 03:32

## 2020-01-01 RX ADMIN — FUROSEMIDE 20 MG: 20 INJECTION, SOLUTION INTRAMUSCULAR; INTRAVENOUS at 05:16

## 2020-01-01 RX ADMIN — INSULIN HUMAN 1 UNITS: 100 INJECTION, SOLUTION PARENTERAL at 18:55

## 2020-01-01 RX ADMIN — VANCOMYCIN HYDROCHLORIDE 1250 MG: 500 INJECTION, POWDER, LYOPHILIZED, FOR SOLUTION INTRAVENOUS at 02:41

## 2020-01-01 RX ADMIN — VANCOMYCIN HYDROCHLORIDE 2500 MG: 500 INJECTION, POWDER, LYOPHILIZED, FOR SOLUTION INTRAVENOUS at 11:34

## 2020-01-01 RX ADMIN — ALPRAZOLAM 0.5 MG: 0.5 TABLET ORAL at 17:37

## 2020-01-01 RX ADMIN — ENOXAPARIN SODIUM 40 MG: 40 INJECTION SUBCUTANEOUS at 04:23

## 2020-01-01 RX ADMIN — Medication 300 MCG/HR: at 06:55

## 2020-01-01 RX ADMIN — AMIODARONE HYDROCHLORIDE 400 MG: 200 TABLET ORAL at 17:50

## 2020-01-01 RX ADMIN — PROPOFOL 70 MCG/KG/MIN: 10 INJECTION, EMULSION INTRAVENOUS at 13:43

## 2020-01-01 RX ADMIN — DEXAMETHASONE SODIUM PHOSPHATE 6 MG: 4 INJECTION, SOLUTION INTRA-ARTICULAR; INTRALESIONAL; INTRAMUSCULAR; INTRAVENOUS; SOFT TISSUE at 04:24

## 2020-01-01 RX ADMIN — ETOMIDATE 30 MG: 2 INJECTION INTRAVENOUS at 18:35

## 2020-01-01 RX ADMIN — PROPOFOL 60 MCG/KG/MIN: 10 INJECTION, EMULSION INTRAVENOUS at 09:13

## 2020-01-01 RX ADMIN — PROPOFOL 60 MCG/KG/MIN: 10 INJECTION, EMULSION INTRAVENOUS at 00:12

## 2020-01-01 RX ADMIN — PROPOFOL 60 MCG/KG/MIN: 10 INJECTION, EMULSION INTRAVENOUS at 12:00

## 2020-01-01 RX ADMIN — DEXAMETHASONE SODIUM PHOSPHATE 6 MG: 4 INJECTION, SOLUTION INTRA-ARTICULAR; INTRALESIONAL; INTRAMUSCULAR; INTRAVENOUS; SOFT TISSUE at 05:09

## 2020-01-01 RX ADMIN — QUETIAPINE FUMARATE 50 MG: 25 TABLET ORAL at 13:16

## 2020-01-01 RX ADMIN — FUROSEMIDE 20 MG: 20 INJECTION, SOLUTION INTRAMUSCULAR; INTRAVENOUS at 05:14

## 2020-01-01 RX ADMIN — MIDAZOLAM HYDROCHLORIDE 5 MG: 1 INJECTION, SOLUTION INTRAMUSCULAR; INTRAVENOUS at 09:43

## 2020-01-01 RX ADMIN — FENTANYL CITRATE 100 MCG: 50 INJECTION, SOLUTION INTRAMUSCULAR; INTRAVENOUS at 22:13

## 2020-01-01 RX ADMIN — VECURONIUM BROMIDE 10 MG: 10 INJECTION, POWDER, LYOPHILIZED, FOR SOLUTION INTRAVENOUS at 21:12

## 2020-01-01 RX ADMIN — VECURONIUM BROMIDE 0.4 MCG/KG/MIN: 1 INJECTION, POWDER, LYOPHILIZED, FOR SOLUTION INTRAVENOUS at 08:39

## 2020-01-01 RX ADMIN — PROPOFOL 40 MCG/KG/MIN: 10 INJECTION, EMULSION INTRAVENOUS at 16:19

## 2020-01-01 RX ADMIN — FUROSEMIDE 20 MG: 20 INJECTION, SOLUTION INTRAMUSCULAR; INTRAVENOUS at 17:43

## 2020-01-01 RX ADMIN — Medication 300 MCG/HR: at 22:11

## 2020-01-01 RX ADMIN — DOCUSATE SODIUM 50 MG AND SENNOSIDES 8.6 MG 2 TABLET: 8.6; 5 TABLET, FILM COATED ORAL at 17:45

## 2020-01-01 RX ADMIN — PROPOFOL 80 MCG/KG/MIN: 10 INJECTION, EMULSION INTRAVENOUS at 01:14

## 2020-01-01 RX ADMIN — LACTULOSE 30 ML: 20 SOLUTION ORAL at 17:38

## 2020-01-01 RX ADMIN — ENOXAPARIN SODIUM 100 MG: 100 INJECTION SUBCUTANEOUS at 04:50

## 2020-01-01 RX ADMIN — MIDAZOLAM 15 MG/HR: 5 INJECTION INTRAMUSCULAR; INTRAVENOUS at 20:13

## 2020-01-01 RX ADMIN — REMDESIVIR 100 MG: 5 INJECTION INTRAVENOUS at 17:27

## 2020-01-01 RX ADMIN — FENTANYL CITRATE 200 MCG: 50 INJECTION, SOLUTION INTRAMUSCULAR; INTRAVENOUS at 23:16

## 2020-01-01 RX ADMIN — PROPOFOL 80 MCG/KG/MIN: 10 INJECTION, EMULSION INTRAVENOUS at 06:13

## 2020-01-01 RX ADMIN — MIDAZOLAM HYDROCHLORIDE 5 MG: 1 INJECTION, SOLUTION INTRAMUSCULAR; INTRAVENOUS at 20:20

## 2020-01-01 RX ADMIN — ENOXAPARIN SODIUM 100 MG: 100 INJECTION SUBCUTANEOUS at 17:37

## 2020-01-01 RX ADMIN — Medication 300 MCG/HR: at 23:07

## 2020-01-01 RX ADMIN — FAMOTIDINE 20 MG: 20 TABLET, FILM COATED ORAL at 17:35

## 2020-01-01 RX ADMIN — PROPOFOL 60 MCG/KG/MIN: 10 INJECTION, EMULSION INTRAVENOUS at 16:14

## 2020-01-01 RX ADMIN — PROPOFOL 60 MCG/KG/MIN: 10 INJECTION, EMULSION INTRAVENOUS at 15:47

## 2020-01-01 RX ADMIN — FUROSEMIDE 20 MG: 10 INJECTION, SOLUTION INTRAVENOUS at 05:14

## 2020-01-01 RX ADMIN — ACETAMINOPHEN 650 MG: 325 TABLET, FILM COATED ORAL at 12:35

## 2020-01-01 RX ADMIN — MINERAL OIL, PETROLATUM 1 APPLICATION: 425; 573 OINTMENT OPHTHALMIC at 05:13

## 2020-01-01 RX ADMIN — MIDODRINE HYDROCHLORIDE 15 MG: 5 TABLET ORAL at 16:54

## 2020-01-01 RX ADMIN — Medication 400 MCG/HR: at 20:46

## 2020-01-01 RX ADMIN — PROPOFOL 60 MCG/KG/MIN: 10 INJECTION, EMULSION INTRAVENOUS at 16:55

## 2020-01-01 RX ADMIN — QUETIAPINE FUMARATE 100 MG: 100 TABLET ORAL at 22:03

## 2020-01-01 RX ADMIN — FAMOTIDINE 20 MG: 20 TABLET, FILM COATED ORAL at 17:28

## 2020-01-01 RX ADMIN — DOCUSATE SODIUM 50 MG AND SENNOSIDES 8.6 MG 2 TABLET: 8.6; 5 TABLET, FILM COATED ORAL at 04:32

## 2020-01-01 RX ADMIN — PROPOFOL 80 MCG/KG/MIN: 10 INJECTION, EMULSION INTRAVENOUS at 11:32

## 2020-01-01 RX ADMIN — FAMOTIDINE 20 MG: 20 TABLET, FILM COATED ORAL at 05:29

## 2020-01-01 RX ADMIN — POTASSIUM PHOSPHATE, MONOBASIC AND POTASSIUM PHOSPHATE, DIBASIC 15 MMOL: 224; 236 INJECTION, SOLUTION, CONCENTRATE INTRAVENOUS at 08:31

## 2020-01-01 RX ADMIN — MIDAZOLAM 15 MG/HR: 5 INJECTION INTRAMUSCULAR; INTRAVENOUS at 02:49

## 2020-01-01 RX ADMIN — Medication 2500 MCG: at 16:33

## 2020-01-01 RX ADMIN — FUROSEMIDE 20 MG: 20 INJECTION, SOLUTION INTRAMUSCULAR; INTRAVENOUS at 04:24

## 2020-01-01 RX ADMIN — ACETAMINOPHEN 650 MG: 325 TABLET, FILM COATED ORAL at 17:19

## 2020-01-01 RX ADMIN — DEXAMETHASONE SODIUM PHOSPHATE 6 MG: 4 INJECTION, SOLUTION INTRA-ARTICULAR; INTRALESIONAL; INTRAMUSCULAR; INTRAVENOUS; SOFT TISSUE at 07:34

## 2020-01-01 RX ADMIN — FAMOTIDINE 20 MG: 20 TABLET, FILM COATED ORAL at 06:24

## 2020-01-01 RX ADMIN — FUROSEMIDE 20 MG: 10 INJECTION, SOLUTION INTRAVENOUS at 06:37

## 2020-01-01 RX ADMIN — VECURONIUM BROMIDE 1 MCG/KG/MIN: 1 INJECTION, POWDER, LYOPHILIZED, FOR SOLUTION INTRAVENOUS at 22:05

## 2020-01-01 RX ADMIN — PROPOFOL 70 MCG/KG/MIN: 10 INJECTION, EMULSION INTRAVENOUS at 08:34

## 2020-01-01 RX ADMIN — ACETAMINOPHEN 650 MG: 325 TABLET, FILM COATED ORAL at 17:43

## 2020-01-01 RX ADMIN — ACETAMINOPHEN 650 MG: 325 TABLET, FILM COATED ORAL at 06:24

## 2020-01-01 RX ADMIN — MIDAZOLAM 15 MG/HR: 5 INJECTION INTRAMUSCULAR; INTRAVENOUS at 17:43

## 2020-01-01 RX ADMIN — PROPOFOL 70 MCG/KG/MIN: 10 INJECTION, EMULSION INTRAVENOUS at 09:08

## 2020-01-01 RX ADMIN — ALPRAZOLAM 0.5 MG: 0.5 TABLET ORAL at 04:48

## 2020-01-01 RX ADMIN — PROPOFOL 70 MCG/KG/MIN: 10 INJECTION, EMULSION INTRAVENOUS at 01:20

## 2020-01-01 RX ADMIN — FUROSEMIDE 20 MG: 20 INJECTION, SOLUTION INTRAMUSCULAR; INTRAVENOUS at 18:07

## 2020-01-01 RX ADMIN — PHENYLEPHRINE HYDROCHLORIDE 170 MCG/MIN: 10 INJECTION INTRAVENOUS at 17:36

## 2020-01-01 RX ADMIN — MINERAL OIL, PETROLATUM 1 APPLICATION: 425; 573 OINTMENT OPHTHALMIC at 14:16

## 2020-01-01 RX ADMIN — ACETAMINOPHEN 650 MG: 325 TABLET, FILM COATED ORAL at 05:29

## 2020-01-01 RX ADMIN — AMIODARONE HYDROCHLORIDE 1 MG/MIN: 1.8 INJECTION, SOLUTION INTRAVENOUS at 11:41

## 2020-01-01 RX ADMIN — POTASSIUM CHLORIDE 40 MEQ: 1500 TABLET, EXTENDED RELEASE ORAL at 08:38

## 2020-01-01 RX ADMIN — PROPOFOL 50 MCG/KG/MIN: 10 INJECTION, EMULSION INTRAVENOUS at 07:56

## 2020-01-01 RX ADMIN — ENOXAPARIN SODIUM 40 MG: 40 INJECTION SUBCUTANEOUS at 04:31

## 2020-01-01 RX ADMIN — PROPOFOL 80 MCG/KG/MIN: 10 INJECTION, EMULSION INTRAVENOUS at 14:22

## 2020-01-01 RX ADMIN — BISACODYL 10 MG: 10 SUPPOSITORY RECTAL at 16:54

## 2020-01-01 RX ADMIN — OXYCODONE 5 MG: 5 TABLET ORAL at 15:20

## 2020-01-01 RX ADMIN — QUETIAPINE FUMARATE 50 MG: 25 TABLET ORAL at 06:25

## 2020-01-01 RX ADMIN — ACETAMINOPHEN 650 MG: 325 TABLET, FILM COATED ORAL at 04:49

## 2020-01-01 RX ADMIN — MIDODRINE HYDROCHLORIDE 15 MG: 5 TABLET ORAL at 13:09

## 2020-01-01 RX ADMIN — FUROSEMIDE 20 MG: 10 INJECTION, SOLUTION INTRAVENOUS at 04:59

## 2020-01-01 RX ADMIN — MIDAZOLAM 15 MG/HR: 5 INJECTION INTRAMUSCULAR; INTRAVENOUS at 05:13

## 2020-01-01 RX ADMIN — ENOXAPARIN SODIUM 40 MG: 40 INJECTION SUBCUTANEOUS at 06:22

## 2020-01-01 RX ADMIN — FUROSEMIDE 20 MG: 20 INJECTION, SOLUTION INTRAMUSCULAR; INTRAVENOUS at 04:48

## 2020-01-01 RX ADMIN — ENOXAPARIN SODIUM 40 MG: 40 INJECTION SUBCUTANEOUS at 05:56

## 2020-01-01 RX ADMIN — DOCUSATE SODIUM 50 MG AND SENNOSIDES 8.6 MG 2 TABLET: 8.6; 5 TABLET, FILM COATED ORAL at 17:19

## 2020-01-01 RX ADMIN — PROPOFOL 80 MCG/KG/MIN: 10 INJECTION, EMULSION INTRAVENOUS at 04:56

## 2020-01-01 RX ADMIN — PROPOFOL 80 MCG/KG/MIN: 10 INJECTION, EMULSION INTRAVENOUS at 00:37

## 2020-01-01 RX ADMIN — ACETAMINOPHEN 650 MG: 325 TABLET, FILM COATED ORAL at 05:14

## 2020-01-01 RX ADMIN — INSULIN HUMAN 1 UNITS: 100 INJECTION, SOLUTION PARENTERAL at 06:01

## 2020-01-01 RX ADMIN — ACETAMINOPHEN 650 MG: 325 TABLET, FILM COATED ORAL at 22:24

## 2020-01-01 RX ADMIN — ACETAMINOPHEN 650 MG: 325 TABLET, FILM COATED ORAL at 00:37

## 2020-01-01 RX ADMIN — ENOXAPARIN SODIUM 40 MG: 40 INJECTION SUBCUTANEOUS at 06:37

## 2020-01-01 RX ADMIN — AMIODARONE HYDROCHLORIDE 1 MG/MIN: 1.8 INJECTION, SOLUTION INTRAVENOUS at 08:04

## 2020-01-01 RX ADMIN — MIDODRINE HYDROCHLORIDE 15 MG: 5 TABLET ORAL at 07:27

## 2020-01-01 RX ADMIN — PROPOFOL 60 MCG/KG/MIN: 10 INJECTION, EMULSION INTRAVENOUS at 10:56

## 2020-01-01 RX ADMIN — FENTANYL CITRATE 100 MCG: 50 INJECTION, SOLUTION INTRAMUSCULAR; INTRAVENOUS at 23:04

## 2020-01-01 RX ADMIN — MIDAZOLAM HYDROCHLORIDE 2 MG: 1 INJECTION, SOLUTION INTRAMUSCULAR; INTRAVENOUS at 23:49

## 2020-01-01 RX ADMIN — MIDAZOLAM 15 MG/HR: 5 INJECTION INTRAMUSCULAR; INTRAVENOUS at 16:20

## 2020-01-01 RX ADMIN — FUROSEMIDE 20 MG: 10 INJECTION, SOLUTION INTRAVENOUS at 18:48

## 2020-01-01 RX ADMIN — FAMOTIDINE 20 MG: 10 INJECTION INTRAVENOUS at 06:37

## 2020-01-01 RX ADMIN — PHENYLEPHRINE HYDROCHLORIDE 220 MCG/MIN: 10 INJECTION INTRAVENOUS at 14:34

## 2020-01-01 RX ADMIN — POTASSIUM BICARBONATE 25 MEQ: 978 TABLET, EFFERVESCENT ORAL at 09:37

## 2020-01-01 RX ADMIN — Medication 300 MCG/HR: at 23:27

## 2020-01-01 RX ADMIN — PROPOFOL 80 MCG/KG/MIN: 10 INJECTION, EMULSION INTRAVENOUS at 18:07

## 2020-01-01 RX ADMIN — DOCUSATE SODIUM 50 MG AND SENNOSIDES 8.6 MG 2 TABLET: 8.6; 5 TABLET, FILM COATED ORAL at 17:28

## 2020-01-01 RX ADMIN — MIDAZOLAM 15 MG/HR: 5 INJECTION INTRAMUSCULAR; INTRAVENOUS at 08:30

## 2020-01-01 RX ADMIN — DOCUSATE SODIUM 50 MG AND SENNOSIDES 8.6 MG 2 TABLET: 8.6; 5 TABLET, FILM COATED ORAL at 05:15

## 2020-01-01 RX ADMIN — LACTULOSE 30 ML: 20 SOLUTION ORAL at 17:19

## 2020-01-01 RX ADMIN — PROPOFOL 40 MCG/KG/MIN: 10 INJECTION, EMULSION INTRAVENOUS at 13:09

## 2020-01-01 RX ADMIN — POLYETHYLENE GLYCOL 3350 1 PACKET: 17 POWDER, FOR SOLUTION ORAL at 17:51

## 2020-01-01 RX ADMIN — PROPOFOL 70 MCG/KG/MIN: 10 INJECTION, EMULSION INTRAVENOUS at 03:58

## 2020-01-01 RX ADMIN — AMIODARONE HYDROCHLORIDE 0.5 MG/MIN: 1.8 INJECTION, SOLUTION INTRAVENOUS at 18:42

## 2020-01-01 RX ADMIN — POLYETHYLENE GLYCOL 3350 1 PACKET: 17 POWDER, FOR SOLUTION ORAL at 06:38

## 2020-01-01 RX ADMIN — ALPRAZOLAM 0.5 MG: 0.5 TABLET ORAL at 12:00

## 2020-01-01 RX ADMIN — PROPOFOL 70 MCG/KG/MIN: 10 INJECTION, EMULSION INTRAVENOUS at 19:58

## 2020-01-01 RX ADMIN — PROPOFOL 60 MCG/KG/MIN: 10 INJECTION, EMULSION INTRAVENOUS at 18:41

## 2020-01-01 RX ADMIN — NOREPINEPHRINE BITARTRATE 20 MCG/MIN: 1 INJECTION INTRAVENOUS at 07:40

## 2020-01-01 RX ADMIN — INSULIN HUMAN 1 UNITS: 100 INJECTION, SOLUTION PARENTERAL at 00:41

## 2020-01-01 RX ADMIN — Medication 200 MCG: at 04:49

## 2020-01-01 RX ADMIN — PROPOFOL 80 MCG/KG/MIN: 10 INJECTION, EMULSION INTRAVENOUS at 17:52

## 2020-01-01 RX ADMIN — QUETIAPINE FUMARATE 50 MG: 25 TABLET ORAL at 05:29

## 2020-01-01 RX ADMIN — ACETAMINOPHEN 650 MG: 325 TABLET, FILM COATED ORAL at 07:49

## 2020-01-01 RX ADMIN — VECURONIUM BROMIDE 10 MG: 10 INJECTION, POWDER, LYOPHILIZED, FOR SOLUTION INTRAVENOUS at 00:39

## 2020-01-01 RX ADMIN — VECURONIUM BROMIDE 10 MG: 10 INJECTION, POWDER, LYOPHILIZED, FOR SOLUTION INTRAVENOUS at 08:14

## 2020-01-01 RX ADMIN — PROPOFOL 80 MCG/KG/MIN: 10 INJECTION, EMULSION INTRAVENOUS at 02:21

## 2020-01-01 RX ADMIN — DOCUSATE SODIUM 50 MG AND SENNOSIDES 8.6 MG 2 TABLET: 8.6; 5 TABLET, FILM COATED ORAL at 17:33

## 2020-01-01 RX ADMIN — DOCUSATE SODIUM 50 MG AND SENNOSIDES 8.6 MG 2 TABLET: 8.6; 5 TABLET, FILM COATED ORAL at 06:37

## 2020-01-01 RX ADMIN — REMDESIVIR 100 MG: 5 INJECTION INTRAVENOUS at 18:38

## 2020-01-01 RX ADMIN — ENOXAPARIN SODIUM 40 MG: 40 INJECTION SUBCUTANEOUS at 06:26

## 2020-01-01 RX ADMIN — MIDODRINE HYDROCHLORIDE 15 MG: 5 TABLET ORAL at 17:51

## 2020-01-01 RX ADMIN — MIDAZOLAM HYDROCHLORIDE 3 MG: 1 INJECTION, SOLUTION INTRAMUSCULAR; INTRAVENOUS at 04:18

## 2020-01-01 RX ADMIN — VANCOMYCIN HYDROCHLORIDE 1250 MG: 500 INJECTION, POWDER, LYOPHILIZED, FOR SOLUTION INTRAVENOUS at 03:27

## 2020-01-01 RX ADMIN — LACTULOSE 30 ML: 20 SOLUTION ORAL at 11:50

## 2020-01-01 RX ADMIN — FUROSEMIDE 20 MG: 20 INJECTION, SOLUTION INTRAMUSCULAR; INTRAVENOUS at 17:36

## 2020-01-01 RX ADMIN — MIDODRINE HYDROCHLORIDE 5 MG: 5 TABLET ORAL at 07:39

## 2020-01-01 RX ADMIN — CEFEPIME 2 G: 2 INJECTION, POWDER, FOR SOLUTION INTRAVENOUS at 05:14

## 2020-01-01 RX ADMIN — AMIODARONE HYDROCHLORIDE 150 MG: 1.5 INJECTION, SOLUTION INTRAVENOUS at 09:20

## 2020-01-01 RX ADMIN — PROPOFOL 60 MCG/KG/MIN: 10 INJECTION, EMULSION INTRAVENOUS at 04:48

## 2020-01-01 RX ADMIN — QUETIAPINE FUMARATE 100 MG: 100 TABLET ORAL at 13:30

## 2020-01-01 RX ADMIN — MIDAZOLAM HYDROCHLORIDE 2 MG: 1 INJECTION, SOLUTION INTRAMUSCULAR; INTRAVENOUS at 22:10

## 2020-01-01 RX ADMIN — POTASSIUM BICARBONATE 50 MEQ: 978 TABLET, EFFERVESCENT ORAL at 08:01

## 2020-01-01 RX ADMIN — PHENYLEPHRINE HYDROCHLORIDE 40 MCG/MIN: 10 INJECTION INTRAVENOUS at 18:12

## 2020-01-01 RX ADMIN — VECURONIUM BROMIDE 1.2 MCG/KG/MIN: 1 INJECTION, POWDER, LYOPHILIZED, FOR SOLUTION INTRAVENOUS at 23:51

## 2020-01-01 RX ADMIN — FAMOTIDINE 20 MG: 20 TABLET, FILM COATED ORAL at 18:07

## 2020-01-01 RX ADMIN — PROPOFOL 70 MCG/KG/MIN: 10 INJECTION, EMULSION INTRAVENOUS at 18:37

## 2020-01-01 RX ADMIN — VECURONIUM BROMIDE 1.2 MCG/KG/MIN: 1 INJECTION, POWDER, LYOPHILIZED, FOR SOLUTION INTRAVENOUS at 13:39

## 2020-01-01 RX ADMIN — PHENYLEPHRINE HYDROCHLORIDE 220 MCG/MIN: 10 INJECTION INTRAVENOUS at 13:46

## 2020-01-01 RX ADMIN — DIBASIC SODIUM PHOSPHATE, MONOBASIC POTASSIUM PHOSPHATE AND MONOBASIC SODIUM PHOSPHATE 500 MG: 852; 155; 130 TABLET ORAL at 14:46

## 2020-01-01 RX ADMIN — QUETIAPINE FUMARATE 100 MG: 100 TABLET ORAL at 21:18

## 2020-01-01 RX ADMIN — PROPOFOL 80 MCG/KG/MIN: 10 INJECTION, EMULSION INTRAVENOUS at 03:33

## 2020-01-01 RX ADMIN — Medication 300 MCG: at 08:02

## 2020-01-01 RX ADMIN — ENOXAPARIN SODIUM 40 MG: 40 INJECTION SUBCUTANEOUS at 05:15

## 2020-01-01 RX ADMIN — PROPOFOL 80 MCG/KG/MIN: 10 INJECTION, EMULSION INTRAVENOUS at 06:30

## 2020-01-01 RX ADMIN — QUETIAPINE FUMARATE 50 MG: 25 TABLET ORAL at 10:30

## 2020-01-01 RX ADMIN — MAGNESIUM SULFATE 2 G: 2 INJECTION INTRAVENOUS at 08:18

## 2020-01-01 RX ADMIN — PROPOFOL 80 MCG/KG/MIN: 10 INJECTION, EMULSION INTRAVENOUS at 20:58

## 2020-01-01 RX ADMIN — PROPOFOL 80 MCG/KG/MIN: 10 INJECTION, EMULSION INTRAVENOUS at 10:47

## 2020-01-01 RX ADMIN — CEFTRIAXONE SODIUM 2 G: 2 INJECTION, POWDER, FOR SOLUTION INTRAMUSCULAR; INTRAVENOUS at 02:14

## 2020-01-01 RX ADMIN — PROPOFOL 80 MCG/KG/MIN: 10 INJECTION, EMULSION INTRAVENOUS at 21:06

## 2020-01-01 RX ADMIN — PROPOFOL 60 MCG/KG/MIN: 10 INJECTION, EMULSION INTRAVENOUS at 10:28

## 2020-01-01 RX ADMIN — DOCUSATE SODIUM 50 MG AND SENNOSIDES 8.6 MG 2 TABLET: 8.6; 5 TABLET, FILM COATED ORAL at 06:02

## 2020-01-01 RX ADMIN — MIDODRINE HYDROCHLORIDE 5 MG: 5 TABLET ORAL at 18:39

## 2020-01-01 RX ADMIN — CEFEPIME 2 G: 2 INJECTION, POWDER, FOR SOLUTION INTRAVENOUS at 05:34

## 2020-01-01 RX ADMIN — FUROSEMIDE 20 MG: 10 INJECTION, SOLUTION INTRAVENOUS at 05:16

## 2020-01-01 RX ADMIN — DOCUSATE SODIUM 50 MG AND SENNOSIDES 8.6 MG 2 TABLET: 8.6; 5 TABLET, FILM COATED ORAL at 18:06

## 2020-01-01 RX ADMIN — INSULIN HUMAN 1 UNITS: 100 INJECTION, SOLUTION PARENTERAL at 11:59

## 2020-01-01 RX ADMIN — ACETAMINOPHEN 650 MG: 325 TABLET, FILM COATED ORAL at 11:52

## 2020-01-01 RX ADMIN — VECURONIUM BROMIDE 0.6 MCG/KG/MIN: 1 INJECTION, POWDER, LYOPHILIZED, FOR SOLUTION INTRAVENOUS at 04:15

## 2020-01-01 RX ADMIN — POLYETHYLENE GLYCOL 3350 1 PACKET: 17 POWDER, FOR SOLUTION ORAL at 00:04

## 2020-01-01 RX ADMIN — Medication: at 18:17

## 2020-01-01 RX ADMIN — PHENYLEPHRINE HYDROCHLORIDE 280 MCG/MIN: 10 INJECTION INTRAVENOUS at 08:31

## 2020-01-01 RX ADMIN — Medication 300 MCG/HR: at 05:06

## 2020-01-01 RX ADMIN — INSULIN HUMAN 1 UNITS: 100 INJECTION, SOLUTION PARENTERAL at 12:38

## 2020-01-01 RX ADMIN — ENOXAPARIN SODIUM 40 MG: 40 INJECTION SUBCUTANEOUS at 06:02

## 2020-01-01 RX ADMIN — SODIUM PHOSPHATE, MONOBASIC, MONOHYDRATE AND SODIUM PHOSPHATE, DIBASIC, ANHYDROUS 15 MMOL: 276; 142 INJECTION, SOLUTION INTRAVENOUS at 10:05

## 2020-01-01 RX ADMIN — PHENYLEPHRINE HYDROCHLORIDE 170 MCG/MIN: 10 INJECTION INTRAVENOUS at 01:51

## 2020-01-01 RX ADMIN — Medication 400 MCG/HR: at 01:06

## 2020-01-01 RX ADMIN — MIDAZOLAM HYDROCHLORIDE 2 MG: 1 INJECTION, SOLUTION INTRAMUSCULAR; INTRAVENOUS at 22:52

## 2020-01-01 RX ADMIN — LACTULOSE 30 ML: 20 SOLUTION ORAL at 06:24

## 2020-01-01 RX ADMIN — VECURONIUM BROMIDE 0.07 MCG/KG/MIN: 1 INJECTION, POWDER, LYOPHILIZED, FOR SOLUTION INTRAVENOUS at 22:55

## 2020-01-01 RX ADMIN — INSULIN HUMAN 1 UNITS: 100 INJECTION, SOLUTION PARENTERAL at 13:37

## 2020-01-01 RX ADMIN — Medication 450 MCG/HR: at 08:01

## 2020-01-01 RX ADMIN — PROPOFOL 80 MCG/KG/MIN: 10 INJECTION, EMULSION INTRAVENOUS at 07:40

## 2020-01-01 RX ADMIN — PROPOFOL 70 MCG/KG/MIN: 10 INJECTION, EMULSION INTRAVENOUS at 22:26

## 2020-01-01 RX ADMIN — Medication 300 MCG/HR: at 10:46

## 2020-01-01 RX ADMIN — PROPOFOL 70 MCG/KG/MIN: 10 INJECTION, EMULSION INTRAVENOUS at 01:04

## 2020-01-01 RX ADMIN — PROPOFOL 70 MCG/KG/MIN: 10 INJECTION, EMULSION INTRAVENOUS at 23:20

## 2020-01-01 RX ADMIN — MINERAL OIL, PETROLATUM 1 APPLICATION: 425; 573 OINTMENT OPHTHALMIC at 05:09

## 2020-01-01 RX ADMIN — PROPOFOL 60 MCG/KG/MIN: 10 INJECTION, EMULSION INTRAVENOUS at 15:06

## 2020-01-01 RX ADMIN — FAMOTIDINE 20 MG: 20 TABLET, FILM COATED ORAL at 04:59

## 2020-01-01 RX ADMIN — Medication 400 MCG/HR: at 06:21

## 2020-01-01 RX ADMIN — PROPOFOL 70 MCG/KG/MIN: 10 INJECTION, EMULSION INTRAVENOUS at 06:14

## 2020-01-01 RX ADMIN — PHENYLEPHRINE HYDROCHLORIDE 170 MCG/MIN: 10 INJECTION INTRAVENOUS at 22:00

## 2020-01-01 RX ADMIN — FAMOTIDINE 20 MG: 10 INJECTION INTRAVENOUS at 17:19

## 2020-01-01 RX ADMIN — QUETIAPINE FUMARATE 100 MG: 100 TABLET ORAL at 12:03

## 2020-01-01 RX ADMIN — NOREPINEPHRINE BITARTRATE 30 MCG/MIN: 1 INJECTION INTRAVENOUS at 20:09

## 2020-01-01 RX ADMIN — FUROSEMIDE 20 MG: 20 INJECTION, SOLUTION INTRAMUSCULAR; INTRAVENOUS at 10:16

## 2020-01-01 RX ADMIN — QUETIAPINE FUMARATE 100 MG: 100 TABLET ORAL at 13:46

## 2020-01-01 RX ADMIN — FENTANYL CITRATE 200 MCG: 50 INJECTION, SOLUTION INTRAMUSCULAR; INTRAVENOUS at 21:10

## 2020-01-01 RX ADMIN — PROPOFOL 80 MCG/KG/MIN: 10 INJECTION, EMULSION INTRAVENOUS at 13:01

## 2020-01-01 RX ADMIN — PHENYLEPHRINE HYDROCHLORIDE 150 MCG/MIN: 10 INJECTION INTRAVENOUS at 13:21

## 2020-01-01 RX ADMIN — PROPOFOL 60 MCG/KG/MIN: 10 INJECTION, EMULSION INTRAVENOUS at 04:17

## 2020-01-01 RX ADMIN — Medication 2500 MCG: at 01:28

## 2020-01-01 RX ADMIN — VECURONIUM BROMIDE 0.8 MCG/KG/MIN: 1 INJECTION, POWDER, LYOPHILIZED, FOR SOLUTION INTRAVENOUS at 03:58

## 2020-01-01 RX ADMIN — PHENYLEPHRINE HYDROCHLORIDE 200 MCG/MIN: 10 INJECTION INTRAVENOUS at 22:03

## 2020-01-01 RX ADMIN — ALPRAZOLAM 0.5 MG: 0.5 TABLET ORAL at 11:52

## 2020-01-01 RX ADMIN — VANCOMYCIN HYDROCHLORIDE 1250 MG: 500 INJECTION, POWDER, LYOPHILIZED, FOR SOLUTION INTRAVENOUS at 12:27

## 2020-01-01 RX ADMIN — FAMOTIDINE 20 MG: 10 INJECTION INTRAVENOUS at 17:39

## 2020-01-01 RX ADMIN — MIDODRINE HYDROCHLORIDE 15 MG: 5 TABLET ORAL at 09:11

## 2020-01-01 RX ADMIN — FAMOTIDINE 20 MG: 10 INJECTION INTRAVENOUS at 17:38

## 2020-01-01 RX ADMIN — AMIODARONE HYDROCHLORIDE 0.5 MG/MIN: 1.8 INJECTION, SOLUTION INTRAVENOUS at 17:18

## 2020-01-01 RX ADMIN — PROPOFOL 60 MCG/KG/MIN: 10 INJECTION, EMULSION INTRAVENOUS at 03:18

## 2020-01-01 RX ADMIN — CEFEPIME 2 G: 2 INJECTION, POWDER, FOR SOLUTION INTRAVENOUS at 05:36

## 2020-01-01 RX ADMIN — DEXAMETHASONE SODIUM PHOSPHATE 6 MG: 4 INJECTION, SOLUTION INTRA-ARTICULAR; INTRALESIONAL; INTRAMUSCULAR; INTRAVENOUS; SOFT TISSUE at 00:45

## 2020-01-01 RX ADMIN — PROPOFOL 70 MCG/KG/MIN: 10 INJECTION, EMULSION INTRAVENOUS at 21:01

## 2020-01-01 RX ADMIN — DOCUSATE SODIUM 50 MG AND SENNOSIDES 8.6 MG 2 TABLET: 8.6; 5 TABLET, FILM COATED ORAL at 06:00

## 2020-01-01 RX ADMIN — SODIUM CHLORIDE 1000 ML: 9 INJECTION, SOLUTION INTRAVENOUS at 01:17

## 2020-01-01 RX ADMIN — FAMOTIDINE 20 MG: 10 INJECTION INTRAVENOUS at 18:49

## 2020-01-01 RX ADMIN — AMIODARONE HYDROCHLORIDE 0.5 MG/MIN: 1.8 INJECTION, SOLUTION INTRAVENOUS at 04:48

## 2020-01-01 RX ADMIN — ACETAMINOPHEN 650 MG: 325 TABLET, FILM COATED ORAL at 23:17

## 2020-01-01 RX ADMIN — PROPOFOL 60 MCG/KG/MIN: 10 INJECTION, EMULSION INTRAVENOUS at 22:34

## 2020-01-01 RX ADMIN — PROPOFOL 60 MCG/KG/MIN: 10 INJECTION, EMULSION INTRAVENOUS at 19:55

## 2020-01-01 RX ADMIN — PROPOFOL 70 MCG/KG/MIN: 10 INJECTION, EMULSION INTRAVENOUS at 11:23

## 2020-01-01 RX ADMIN — FUROSEMIDE 20 MG: 10 INJECTION, SOLUTION INTRAVENOUS at 05:23

## 2020-01-01 RX ADMIN — PROPOFOL 80 MCG/KG/MIN: 10 INJECTION, EMULSION INTRAVENOUS at 23:01

## 2020-01-01 RX ADMIN — ACETAMINOPHEN 650 MG: 325 TABLET, FILM COATED ORAL at 11:53

## 2020-01-01 RX ADMIN — PROPOFOL 70 MCG/KG/MIN: 10 INJECTION, EMULSION INTRAVENOUS at 05:16

## 2020-01-01 RX ADMIN — FAMOTIDINE 20 MG: 10 INJECTION INTRAVENOUS at 05:36

## 2020-01-01 RX ADMIN — PROPOFOL 60 MCG/KG/MIN: 10 INJECTION, EMULSION INTRAVENOUS at 13:08

## 2020-01-01 RX ADMIN — NOREPINEPHRINE BITARTRATE 6 MCG/MIN: 1 INJECTION INTRAVENOUS at 16:03

## 2020-01-01 RX ADMIN — PROPOFOL 80 MCG/KG/MIN: 10 INJECTION, EMULSION INTRAVENOUS at 06:12

## 2020-01-01 RX ADMIN — PROPOFOL 80 MCG/KG/MIN: 10 INJECTION, EMULSION INTRAVENOUS at 02:59

## 2020-01-01 RX ADMIN — PROPOFOL 70 MCG/KG/MIN: 10 INJECTION, EMULSION INTRAVENOUS at 18:49

## 2020-01-01 RX ADMIN — ALPRAZOLAM 0.25 MG: 0.25 TABLET ORAL at 11:30

## 2020-01-01 RX ADMIN — FAMOTIDINE 20 MG: 20 TABLET, FILM COATED ORAL at 05:59

## 2020-01-01 RX ADMIN — PROPOFOL 45 MCG/KG/MIN: 10 INJECTION, EMULSION INTRAVENOUS at 00:40

## 2020-01-01 RX ADMIN — SODIUM PHOSPHATE, MONOBASIC, MONOHYDRATE AND SODIUM PHOSPHATE, DIBASIC, ANHYDROUS 30 MMOL: 276; 142 INJECTION, SOLUTION INTRAVENOUS at 09:15

## 2020-01-01 RX ADMIN — PHENYLEPHRINE HYDROCHLORIDE 50 MCG/MIN: 10 INJECTION INTRAVENOUS at 08:10

## 2020-01-01 ASSESSMENT — COGNITIVE AND FUNCTIONAL STATUS - GENERAL
MOVING FROM LYING ON BACK TO SITTING ON SIDE OF FLAT BED: A LITTLE
MOBILITY SCORE: 21
CLIMB 3 TO 5 STEPS WITH RAILING: A LITTLE
SUGGESTED CMS G CODE MODIFIER MOBILITY: CJ
SUGGESTED CMS G CODE MODIFIER DAILY ACTIVITY: CJ
STANDING UP FROM CHAIR USING ARMS: A LITTLE
HELP NEEDED FOR BATHING: A LITTLE
DRESSING REGULAR LOWER BODY CLOTHING: A LITTLE
DAILY ACTIVITIY SCORE: 22

## 2020-01-01 ASSESSMENT — FIBROSIS 4 INDEX
FIB4 SCORE: 1.93
FIB4 SCORE: 1.59
FIB4 SCORE: 0.94
FIB4 SCORE: 0.82
FIB4 SCORE: 1.02
FIB4 SCORE: 1
FIB4 SCORE: 1.59
FIB4 SCORE: 1.59
FIB4 SCORE: 3.21
FIB4 SCORE: 0.97

## 2020-01-01 ASSESSMENT — ENCOUNTER SYMPTOMS
CHILLS: 1
COUGH: 1
SORE THROAT: 0
SPUTUM PRODUCTION: 1
SORE THROAT: 0
SPUTUM PRODUCTION: 0
ABDOMINAL PAIN: 0
BACK PAIN: 0
VOMITING: 0
NAUSEA: 0
VOMITING: 0
MYALGIAS: 1
STRIDOR: 0
SHORTNESS OF BREATH: 1
EYE REDNESS: 0
FOCAL WEAKNESS: 0
HEADACHES: 0
BRUISES/BLEEDS EASILY: 0
HEADACHES: 0
FEVER: 1
SEIZURES: 0
EYE DISCHARGE: 0
SHORTNESS OF BREATH: 1
NAUSEA: 0
DEPRESSION: 0
CHILLS: 0
WEAKNESS: 0
EYE DISCHARGE: 0
MYALGIAS: 0
DIARRHEA: 1
NAUSEA: 0
NERVOUS/ANXIOUS: 0
COUGH: 1
SPUTUM PRODUCTION: 0
NECK PAIN: 0
VOMITING: 0
BLURRED VISION: 0
EYE REDNESS: 0
WEIGHT LOSS: 0
SENSORY CHANGE: 0
CHILLS: 1
DIARRHEA: 0
EYE PAIN: 0
DIZZINESS: 0
MYALGIAS: 1
ABDOMINAL PAIN: 0
FEVER: 1

## 2020-01-01 ASSESSMENT — LIFESTYLE VARIABLES
HOW MANY TIMES IN THE PAST YEAR HAVE YOU HAD 5 OR MORE DRINKS IN A DAY: 0
AVERAGE NUMBER OF DAYS PER WEEK YOU HAVE A DRINK CONTAINING ALCOHOL: 0
TOTAL SCORE: 0
EVER HAD A DRINK FIRST THING IN THE MORNING TO STEADY YOUR NERVES TO GET RID OF A HANGOVER: NO
EVER FELT BAD OR GUILTY ABOUT YOUR DRINKING: NO
CONSUMPTION TOTAL: NEGATIVE
TOTAL SCORE: 0
TOTAL SCORE: 0
HAVE YOU EVER FELT YOU SHOULD CUT DOWN ON YOUR DRINKING: NO
ON A TYPICAL DAY WHEN YOU DRINK ALCOHOL HOW MANY DRINKS DO YOU HAVE: 0
HAVE PEOPLE ANNOYED YOU BY CRITICIZING YOUR DRINKING: NO
DO YOU DRINK ALCOHOL: NO

## 2020-01-01 ASSESSMENT — COPD QUESTIONNAIRES
HAVE YOU SMOKED AT LEAST 100 CIGARETTES IN YOUR ENTIRE LIFE: NO/DON'T KNOW
DURING THE PAST 4 WEEKS HOW MUCH DID YOU FEEL SHORT OF BREATH: SOME OF THE TIME
COPD SCREENING SCORE: 2
DO YOU EVER COUGH UP ANY MUCUS OR PHLEGM?: NO/ONLY WITH OCCASIONAL COLDS OR INFECTIONS

## 2020-01-01 ASSESSMENT — PAIN DESCRIPTION - PAIN TYPE
TYPE: ACUTE PAIN
TYPE: REFERRED PAIN
TYPE: ACUTE PAIN

## 2020-01-01 ASSESSMENT — PATIENT HEALTH QUESTIONNAIRE - PHQ9
1. LITTLE INTEREST OR PLEASURE IN DOING THINGS: NOT AT ALL
2. FEELING DOWN, DEPRESSED, IRRITABLE, OR HOPELESS: NOT AT ALL
SUM OF ALL RESPONSES TO PHQ9 QUESTIONS 1 AND 2: 0

## 2020-11-07 NOTE — PROGRESS NOTES
"Subjective:      Iain Costa is a 55 y.o. male who presents with Cough (x 3 days, Dry cough, bodyaches, chills. Exposure to COVID)            3 days dry cough, shortness of breath, subjective fever and chills.  No wheeze.  No PMH asthma COPD or pneumonia.  Associated myalgia and significant fatigue.  + Headache.  Close COVID-19 exposure/brother.  Minimal relief with OTC medications.  No other aggravating or alleviating factors.      Review of Systems   Constitutional: Negative for malaise/fatigue and weight loss.   Eyes: Negative for discharge and redness.   Gastrointestinal: Negative for nausea and vomiting.   Musculoskeletal: Negative for joint pain and myalgias.   Skin: Negative for itching and rash.     .  Medications, Allergies, and current problem list reviewed today in Epic       Objective:     /78 (BP Location: Left arm, Patient Position: Sitting, BP Cuff Size: Large adult)   Pulse 90   Temp 37.1 °C (98.8 °F) (Temporal)   Resp 20   Ht 1.727 m (5' 8\")   Wt 103.4 kg (228 lb)   SpO2 93%   BMI 34.67 kg/m²      Physical Exam  Constitutional:       General: He is not in acute distress.     Appearance: He is well-developed.   HENT:      Head: Normocephalic and atraumatic.   Eyes:      Conjunctiva/sclera: Conjunctivae normal.   Cardiovascular:      Rate and Rhythm: Normal rate and regular rhythm.      Heart sounds: Normal heart sounds. No murmur.   Pulmonary:      Effort: Pulmonary effort is normal.      Breath sounds: Rhonchi present.   Skin:     General: Skin is warm and dry.      Findings: No rash.   Neurological:      Mental Status: He is alert and oriented to person, place, and time.                 Assessment/Plan:      X-ray per radiology:     1.  Left lower lobe alveolar edema or pneumonia.     2.  Interstitial edema or pneumonia in the right upper lobe.    1. Cough  DX-CHEST-2 VIEWS    COVID/SARS COV-2 PCR   2. Myalgia  COVID/SARS COV-2 PCR   3. Shortness of breath  DX-CHEST-2 VIEWS    " COVID/SARS COV-2 PCR   4. Multifocal pneumonia  doxycycline (VIBRAMYCIN) 100 MG Tab    CBC WITH DIFFERENTIAL    Comp Metabolic Panel    D-DIMER    PROCALCITONIN    CRP QUANTITIVE (NON-CARDIAC)    benzonatate (TESSALON) 200 MG capsule     Differential diagnosis, natural history, supportive care, and indications for immediate follow-up discussed at length.     Walk patient up and down the lópez 3 times and pulse ox remained 92 to 93%.  Discussed that he makes criteria for trial of outpatient therapy and would like to try this.  High suspicion of COVID-19 pneumonia however will initiate antibiotics and follow-up laboratory studies.

## 2020-11-12 PROBLEM — U07.1 COVID-19 VIRUS INFECTION: Status: ACTIVE | Noted: 2020-01-01

## 2020-11-12 PROBLEM — J80 ACUTE RESPIRATORY DISTRESS SYNDROME (ARDS) DUE TO COVID-19 VIRUS (HCC): Status: ACTIVE | Noted: 2020-01-01

## 2020-11-12 PROBLEM — E87.6 HYPOKALEMIA: Status: ACTIVE | Noted: 2020-01-01

## 2020-11-12 PROBLEM — Z66 DNR (DO NOT RESUSCITATE): Status: ACTIVE | Noted: 2020-01-01

## 2020-11-12 PROBLEM — J96.01 ACUTE RESPIRATORY FAILURE WITH HYPOXIA (HCC): Status: ACTIVE | Noted: 2020-01-01

## 2020-11-12 PROBLEM — U07.1 ACUTE RESPIRATORY DISTRESS SYNDROME (ARDS) DUE TO COVID-19 VIRUS (HCC): Status: ACTIVE | Noted: 2020-01-01

## 2020-11-12 PROBLEM — R74.01 TRANSAMINITIS: Status: ACTIVE | Noted: 2020-01-01

## 2020-11-12 NOTE — DISCHARGE PLANNING
Medical Social Work    Referral: Acute Medical Patient    Intervention: pt is a 55 year old male brought in by [facility] from home for SOB.  Pt is Iain Costa (: 1965).  Per [facility] pt tested positive for Covid 6 days ago.  [facility] states that family was at the home with pt but will not be coming into the hospital.    Plan: SW will follow as needed.

## 2020-11-12 NOTE — ASSESSMENT & PLAN NOTE
Continue with the following therapies while monitoring closely:  Decadron: Completed 10-day course 11/21  Remdesivir: Completed 5-day course  Diuresis: Continue Lasix twice daily  Antitussives, supportive care, monitoring inflammatory markers and LFTs  Maintain strict isolation precautions  S/p 72 hours of proning protocols twice  Sedation as needed

## 2020-11-12 NOTE — ED TRIAGE NOTES
Chief Complaint   Patient presents with   • Shortness of Breath     Tested POSITIVE for COVID 6 days ago and was given Doxycycline and Tessalon, the patient had a slight improvement in symptoms and then around 1800 tonight the patient became more SOB and called EMS     Patient BIB EMS for the above complaint. The patient was found at 70% on RA and required NRB O2 at 15LPM. The patient's SpO2 only increased to 85% on the O2 intervention with rhonchi and expiratory wheezes noted on the right side with diminished breath sounds on left.    The patient was given one albuterol Tx, IV established. ERP and RT met patient upon arrival.  The patient was placed on high flow at 60lpm and 100% and increased his SpO2 to 93%.

## 2020-11-12 NOTE — PROGRESS NOTES
[facility] Day Intensivist note    See Dr Doyle note  EHR reviewed    A&O x4 - Greek  Hemodyanmics ok  Afebrile  Reg diet  UO adequate  HF NC O2 100/60, +/- NRBM as needed  WOB ok  Decadron 1/10  RDV 1/5  Lovenox    RN d/w  - full code    D/w RN, RT, Charge RN, Patient and Pharmacy

## 2020-11-12 NOTE — PROGRESS NOTES
Utilized language line  to facilitate conversation with patient to clarify code status. Thoroughly explained to patient the meaning of DNAR/DNI vs FULL code. Spoke to patient for approximately 30 minutes. Patient expressed that he did not previously fully understand what it would mean to be intubated. Patient clarified that he would now like to change code status to FULL. MD Galeana notified of patient decision.

## 2020-11-12 NOTE — ED NOTES
Report to SACHIN Denise. Pt has all belongings at time of transfer including cell phone, , and wallet. Pt transported to T610 via ACLS RN, monitor, RT and [facility]NC.

## 2020-11-12 NOTE — ED PROVIDER NOTES
ED Provider Note    CHIEF COMPLAINT  Chief Complaint   Patient presents with   • Shortness of Breath     Tested POSITIVE for COVID 6 days ago and was given Doxycycline and Tessalon, the patient had a slight improvement in symptoms and then around 1800 tonight the patient became more SOB and called EMS       HPI  Iain Costa is a 55 y.o. otherwise healthy  male who presents with shortness of breath.  The patient reports ongoing symptoms for the last 6 days.  He was seen at an outside clinic where he was diagnosed with Covid 6 days ago.  He states that his symptoms have worsened over the last 2 days.  He describes a tightness in the right chest and upper abdomen as well as significant difficulty breathing.  He reports subjective fevers however has not taken his temperature.  He has had some diarrhea however no vomiting.  He does have a mild cough and did have some blood-tinged sputum this morning.  No lower extremity swelling.  He has been taking doxycycline and Tessalon Perles since his visit to the clinic 6 days ago without significant improvement of his symptoms.  He did receive 1 albuterol treatment in route by EMS without significant improvement of symptoms.    REVIEW OF SYSTEMS  See HPI for further details.   Review of Systems   Constitutional: Positive for fever. Negative for chills.   HENT: Negative for sore throat.    Eyes: Negative for blurred vision and redness.   Respiratory: Positive for cough, sputum production and shortness of breath.    Cardiovascular: Positive for chest pain. Negative for leg swelling.   Gastrointestinal: Positive for diarrhea. Negative for abdominal pain and vomiting.   Genitourinary: Negative for dysuria and urgency.   Musculoskeletal: Negative for back pain and neck pain.   Skin: Negative for rash.   Neurological: Negative for focal weakness, seizures and headaches.   Psychiatric/Behavioral: Negative for suicidal ideas.         PAST MEDICAL HISTORY   has a past medical  "history of Known health problems: none.    SOCIAL HISTORY  Social History     Tobacco Use   • Smoking status: Never Smoker   • Smokeless tobacco: Never Used   Substance and Sexual Activity   • Alcohol use: No   • Drug use: No   • Sexual activity: Not on file       SURGICAL HISTORY  patient denies any surgical history    CURRENT MEDICATIONS  Home Medications     Reviewed by Kenneth Peralta R.N. (Registered Nurse) on 11/12/20 at 0028  Med List Status: <None>   Medication Last Dose Status   benzonatate (TESSALON) 200 MG capsule  Active   doxycycline (VIBRAMYCIN) 100 MG Tab  Active                ALLERGIES  No Known Allergies    PHYSICAL EXAM   VITAL SIGNS: /68   Pulse 79   Temp 37.4 °C (99.4 °F) (Temporal)   Resp (!) 43   Ht 1.727 m (5' 8\")   Wt 101.9 kg (224 lb 10.4 oz)   SpO2 (!) 87%   BMI 34.16 kg/m²      Physical Exam   Constitutional: He is oriented to person, place, and time and well-developed, well-nourished, and in no distress. No distress.   Ill-appearing anxious  male   HENT:   Head: Normocephalic and atraumatic.   Eyes: Pupils are equal, round, and reactive to light. Conjunctivae are normal.   Neck: Normal range of motion. Neck supple.   Cardiovascular: Regular rhythm and normal heart sounds.   Tachycardic   Pulmonary/Chest: He is in respiratory distress.   Tachypneic with increased work of breathing. Diminished breath sounds at the bases.  No wheezing.   Abdominal: Soft. He exhibits no distension. There is no abdominal tenderness.   Musculoskeletal: Normal range of motion.         General: No tenderness or edema.   Neurological: He is alert and oriented to person, place, and time.   Moving all extremities spontaneously   Skin: Skin is warm and dry.   Psychiatric: Affect normal.   Anxious appearing         DIAGNOSTIC STUDIES    EKG  Results for orders placed or performed during the hospital encounter of 11/12/20   EKG   Result Value Ref Range    Report       [facility]    Test Date:  2020  Pt Name:    PATSY GRADY                Department: ER  MRN:        [id number]                      Room:       RD 03  Gender:     Male                         Technician: [id number]  :        1965                   Requested By:DELANEY MULLIGAN  Order #:    [id number]                    Reading MD: Delaney Mulligan MD    Measurements  Intervals                                Axis  Rate:       106                          P:          5  DE:         132                          QRS:        24  QRSD:       95                           T:          37  QT:         339  QTc:        451    Interpretive Statements  Sinus tachycardia  Ventricular premature complex  No ectopy or hypertrophy  No ST or T wave change  No previous ECG available for comparison  Electronically Signed On 2020 1:39:02 PST by Delaney Mulligan MD             LABS  Personally reviewed by me  Labs Reviewed   CBC WITH DIFFERENTIAL - Abnormal; Notable for the following components:       Result Value    Neutrophils-Polys 87.00 (*)     Lymphocytes 11.30 (*)     Neutrophils (Absolute) 8.88 (*)     All other components within normal limits    Narrative:     Droplet, Contact, and Eye Protection   COMP METABOLIC PANEL - Abnormal; Notable for the following components:    Potassium 3.3 (*)     Glucose 129 (*)     AST(SGOT) 72 (*)     ALT(SGPT) 88 (*)     Albumin 3.0 (*)     Globulin 4.0 (*)     All other components within normal limits    Narrative:     Droplet, Contact, and Eye Protection   D-DIMER - Abnormal; Notable for the following components:    D-Dimer Screen 1.70 (*)     All other components within normal limits    Narrative:     Droplet, Contact, and Eye Protection   CRP QUANTITIVE (NON-CARDIAC) - Abnormal; Notable for the following components:    Stat C-Reactive Protein 25.08 (*)     All other components within normal limits    Narrative:     Droplet, Contact, and Eye Protection   PROBRAIN NATRIURETIC  "PEPTIDE, NT - Abnormal; Notable for the following components:    NT-proBNP 174 (*)     All other components within normal limits    Narrative:     Droplet, Contact, and Eye Protection   LACTIC ACID    Narrative:     Droplet, Contact, and Eye Protection  Repeat if initial lactic acid result is greater than 2   PROCALCITONIN    Narrative:     Droplet, Contact, and Eye Protection   INTERLEUKIN 6    Narrative:     Droplet, Contact, and Eye Protection   TROPONIN    Narrative:     Droplet, Contact, and Eye Protection   BLOOD CULTURE    Narrative:     Droplet, Contact, and Eye Protection  1 of 2 for Blood Culture x 2 sites order. Per Hospital  Policy: Only change Specimen Src: to \"Line\" if specified by  physician order.   BLOOD CULTURE    Narrative:     Droplet, Contact, and Eye Protection  2 of 2 blood culture x2  Sites order. Per Hospital Policy:  Only change Specimen Src: to \"Line\" if specified by physician  order.   ESTIMATED GFR    Narrative:     Droplet, Contact, and Eye Protection   DIFFERENTIAL MANUAL    Narrative:     Droplet, Contact, and Eye Protection   PERIPHERAL SMEAR REVIEW    Narrative:     Droplet, Contact, and Eye Protection   PLATELET ESTIMATE    Narrative:     Droplet, Contact, and Eye Protection   MORPHOLOGY    Narrative:     Droplet, Contact, and Eye Protection   URINALYSIS   URINE CULTURE(NEW)           RADIOLOGY  Personally reviewed by me  DX-CHEST-PORTABLE (1 VIEW)   Final Result      Diffuse bilateral opacities consistent with multifocal pneumonia. Covid 19 pneumonia cannot be excluded.            ED COURSE  Vitals:    11/12/20 0430 11/12/20 0500 11/12/20 0530 11/12/20 0600   BP: 116/61  118/68    Pulse: 76 77 79    Resp: (!) 37 (!) 49 (!) 43    Temp:       TempSrc:       SpO2: 88% 88% (!) 86% (!) 87%   Weight:    101.9 kg (224 lb 10.4 oz)   Height:    1.727 m (5' 8\")         Medications administered:  IVF, dexamethasone, ceftriaxone    Patient was given IV fluids for tachycardia and possible " sepsis.  IV hydration was used because oral hydration was not adequate alone.  Following fluid administration patient's vital signs and hydration status were improved.      Old records personally reviewed:  Patient was seen at outside clinic on 11/6.  Covid test was positive at that time.  Chest x-ray showed left lower lobe alveolar edema or pneumonia as well as interstitial edema or pneumonia in the right upper lobe.   Labs showed mild neutropenia and elevated CRP.  Procalcitonin and D-dimer were normal.  He had a mild transaminitis as well.      MEDICAL DECISION MAKING  Otherwise healthy middle-aged male with known Covid infection presents with worsening shortness of breath.  He is afebrile on arrival, tachycardic with a normal blood pressure.  He is significantly tachypneic with increased work of breathing with oxygen saturations in the high 80s on a nonrebreather.  Patient was immediately placed on high flow oxygen with some improvement of his symptoms.  EKG does not show signs of ischemia or arrhythmia.  Troponin is negative without concern for ACS.  Lactate is normal and patient does not have associated leukocytosis to suggest sepsis.  He has no significant electrolyte abnormalities.  BNP and D-dimer are elevated which likely goes along with Covid infection rather than pulmonary embolism.  His chest x-ray unfortunately shows diffuse bilateral opacities also likely consistent with Covid infection.  He was covered with antibiotics in case of overlying bacterial infection as well as given IV steroids.    On reassessment following short time on high flow oxygen, the patient states he is feeling significantly improved.  He remains low 90s for his oxygen saturations and does also remain tachypneic although is speaking in full sentences.  Respiratory status does appear improved with proning.    I discussed the case with Dr. Nino, intensivist on-call who accepts admission of the patient.  The patient is in critical  condition at the time of transfer to the ICU.    CRITICAL CARE TIME  Upon my evaluation, this patient had a high probability of imminent or life-threatening deterioration due to acute hypoxic respiratory failure due to COVID-19 infection which required my direct attention, intervention, and personal management.     I personally provided 45 minutes of total critical care time outside of time spent on separately billable/documented procedures. Time includes: review of laboratory data, review of radiology studies, discussion with consultants, discussion with family/patient, monitoring for potential decompensation.  Interventions were performed as documented above.         IMPRESSION  COVID 19 infection  Acute hypoxic respiratory failure        Disposition: Admit medicine, critical condition  Results, diagnoses, and treatment options were discussed with the patient and/or family. Patient verbalized understanding of plan of care.    Patient referred to primary care provider for monitoring and treatment of blood pressure.      Current Discharge Medication List              Electronically signed by: Delaney Landin M.D., 11/12/2020 12:31 AM

## 2020-11-12 NOTE — CONSULTS
Critical Care Consultation    Date of consult: 11/12/2020    Referring Physician  Delaney Landin M.D.    Reason for Consultation  COVID +, acute hypoxic respiratory failure    History of Presenting Illness  55 y.o. male with no pmhx who presented 11/12/2020 with worsening dyspnea for 6 days.  The patient states he tested positive for coronavirus 6 days ago at an outside clinic and was given doxycycline and Tessalon Perles. Since then has had progressively worsening fever, chills, myalgia, arthralgia, nonproductive cough, loss of smell/taste and dyspnea.  These symptoms particularly worsened over the last 2 days prompting him to visit the ER where he was found to have significant hypoxia requiring high flow nasal cannula.  Initially on presentation he was severely dyspneic with substantial work of breathing however providers in the ER and the patient states this is improved substantially since being placed on high flow.  The patient's chest x-ray is consistent with his diagnosis with multifocal bilateral patchy opacities, labs include a CBC which is unremarkable, metabolic panel with mild hypokalemia at 3.3, glucose 129, AST 72, ALT 80.  His inflammatory markers were mildly elevated with a D-dimer of 1.7, PCT 0.23, CRP 25.08.  The patient is to be admitted the ICU for his acute hypoxic respiratory failure secondary to  COVID-19 induced ARDS.  A prolonged discussion was held with the patient regarding his CODE STATUS including isolated mechanical ventilation versus mechanical ventilation+CPR.  The patient states that he does not want any aggressive mechanical ventilation or chest compressions if he were to worsen.      Code Status  DNAR/DNI    Review of Systems  Review of Systems   Constitutional: Positive for chills, fever and malaise/fatigue.   Eyes: Negative for blurred vision.   Respiratory: Positive for cough and shortness of breath. Negative for sputum production and stridor.    Cardiovascular: Negative for  chest pain.   Gastrointestinal: Negative for abdominal pain, nausea and vomiting.   Genitourinary: Negative for dysuria.   Musculoskeletal: Positive for joint pain and myalgias.   Skin: Negative for rash.   Neurological: Negative for dizziness, sensory change and focal weakness.       Past Medical History   has a past medical history of Known health problems: none.    Surgical History   has no past surgical history on file.    Family History  family history is not on file.    Social History   reports that he has never smoked. He has never used smokeless tobacco. He reports that he does not drink alcohol or use drugs.    Medications  Home Medications     Reviewed by Kenneth Peralta R.N. (Registered Nurse) on 11/12/20 at 0028  Med List Status: <None>   Medication Last Dose Status   benzonatate (TESSALON) 200 MG capsule  Active   doxycycline (VIBRAMYCIN) 100 MG Tab  Active              Current Facility-Administered Medications   Medication Dose Route Frequency Provider Last Rate Last Admin   • cefTRIAXone (ROCEPHIN) 2 g in  mL IVPB  2 g Intravenous Once Delaney Landin M.D.         Current Outpatient Medications   Medication Sig Dispense Refill   • doxycycline (VIBRAMYCIN) 100 MG Tab Take 1 Tab by mouth 2 times a day for 7 days. 14 Tab 0   • benzonatate (TESSALON) 200 MG capsule Take 1 Cap by mouth 3 times a day as needed for Cough. 30 Cap 0       Allergies  No Known Allergies    Vital Signs last 24 hours  Temp:  [37.4 °C (99.4 °F)] 37.4 °C (99.4 °F)  Pulse:  [] 97  Resp:  [30-55] 55  BP: (107-111)/(70-77) 111/70  SpO2:  [85 %-90 %] 89 %    Physical Exam  Physical Exam  Vitals signs and nursing note reviewed.   Constitutional:       General: He is in acute distress.      Appearance: Normal appearance. He is well-developed and normal weight. He is ill-appearing and toxic-appearing.      Comments: High flow nasal cannula place   HENT:      Head: Normocephalic and atraumatic.      Right Ear: External ear  normal.      Left Ear: External ear normal.      Nose: Nose normal.      Mouth/Throat:      Mouth: Mucous membranes are moist.   Eyes:      Extraocular Movements: Extraocular movements intact.      Conjunctiva/sclera: Conjunctivae normal.   Neck:      Musculoskeletal: Neck supple.      Vascular: No JVD.      Trachea: No tracheal deviation.   Cardiovascular:      Rate and Rhythm: Regular rhythm. Tachycardia present.      Pulses: Normal pulses.   Pulmonary:      Effort: Tachypnea, accessory muscle usage and respiratory distress present.      Breath sounds: Decreased air movement present. Rales present.   Abdominal:      General: Bowel sounds are normal. There is no distension.      Palpations: Abdomen is soft.   Musculoskeletal:         General: No tenderness.      Right lower leg: No edema.      Left lower leg: No edema.   Skin:     General: Skin is warm and dry.      Capillary Refill: Capillary refill takes less than 2 seconds.      Findings: No rash.   Neurological:      General: No focal deficit present.      Mental Status: He is alert and oriented to person, place, and time.      Cranial Nerves: No cranial nerve deficit.      Sensory: No sensory deficit.      Motor: No weakness.   Psychiatric:         Mood and Affect: Mood normal.         Behavior: Behavior normal.         Fluids  No intake or output data in the 24 hours ending 11/12/20 0153    Laboratory  Recent Results (from the past 48 hour(s))   COMP METABOLIC PANEL    Collection Time: 11/12/20 12:23 AM   Result Value Ref Range    Sodium 135 135 - 145 mmol/L    Potassium 3.3 (L) 3.6 - 5.5 mmol/L    Chloride 99 96 - 112 mmol/L    Co2 20 20 - 33 mmol/L    Anion Gap 16.0 7.0 - 16.0    Glucose 129 (H) 65 - 99 mg/dL    Bun 16 8 - 22 mg/dL    Creatinine 0.74 0.50 - 1.40 mg/dL    Calcium 9.1 8.5 - 10.5 mg/dL    AST(SGOT) 72 (H) 12 - 45 U/L    ALT(SGPT) 88 (H) 2 - 50 U/L    Alkaline Phosphatase 80 30 - 99 U/L    Total Bilirubin 0.8 0.1 - 1.5 mg/dL    Albumin 3.0 (L)  3.2 - 4.9 g/dL    Total Protein 7.0 6.0 - 8.2 g/dL    Globulin 4.0 (H) 1.9 - 3.5 g/dL    A-G Ratio 0.8 g/dL   D-Dimer    Collection Time: 20 12:23 AM   Result Value Ref Range    D-Dimer Screen 1.70 (H) 0.00 - 0.50 ug/mL (FEU)   CRP Quantitative (Non-Cardiac)    Collection Time: 20 12:23 AM   Result Value Ref Range    Stat C-Reactive Protein 25.08 (H) 0.00 - 0.75 mg/dL   Procalcitonin    Collection Time: 20 12:23 AM   Result Value Ref Range    Procalcitonin 0.23 <0.25 ng/mL   Troponin    Collection Time: 20 12:23 AM   Result Value Ref Range    Troponin T 13 6 - 19 ng/L   proBrain Natriuretic Peptide, NT    Collection Time: 20 12:23 AM   Result Value Ref Range    NT-proBNP 174 (H) 0 - 125 pg/mL   ESTIMATED GFR    Collection Time: 20 12:23 AM   Result Value Ref Range    GFR If African American >60 >60 mL/min/1.73 m 2    GFR If Non African American >60 >60 mL/min/1.73 m 2   Lactic acid (lactate): Repeat if initial lactic acid result is greater than 2    Collection Time: 20 12:38 AM   Result Value Ref Range    Lactic Acid 1.7 0.5 - 2.0 mmol/L   EKG    Collection Time: 20 12:47 AM   Result Value Ref Range    Report       [facility] Emergency Dept.    Test Date:  2020  Pt Name:    PATSY GRADY                Department: ER  MRN:        [id number]                      Room:        03  Gender:     Male                         Technician: [id number]  :        1965                   Requested By:DELANEY MULLIGAN  Order #:    [id number]                    Reading MD: Delaney Mulligan MD    Measurements  Intervals                                Axis  Rate:       106                          P:          5  AR:         132                          QRS:        24  QRSD:       95                           T:          37  QT:         339  QTc:        451    Interpretive Statements  Sinus tachycardia  Ventricular premature complex  No ectopy or hypertrophy  No  ST or T wave change  No previous ECG available for comparison  Electronically Signed On 11- 1:39:02 PST by Delaney Landin MD         Imaging  DX-CHEST-PORTABLE (1 VIEW)   Final Result      Diffuse bilateral opacities consistent with multifocal pneumonia. Covid 19 pneumonia cannot be excluded.          Assessment/Plan  DNR (do not resuscitate)- (present on admission)  Assessment & Plan  Discussed multiple times with the patient  Continue to address as his clinical condition progresses    Transaminitis- (present on admission)  Assessment & Plan  Likely 2/2 COVID-19  Avoid hepatotoxins  Monitor synthetic function    Hypokalemia- (present on admission)  Assessment & Plan  Replete to maintain greater than 4    Acute respiratory distress syndrome (ARDS) due to COVID-19 virus (HCC)  Assessment & Plan  Meets diagnostic requirements by [place] criteria  Bilateral, multifocal opacities.  Considered unlikely to be cardiac  Onset < 7 days  PaO2/FiO2 <300    COVID-19 virus infection- (present on admission)  Assessment & Plan  Detected on 11/6/2020  Worsening symptoms prompting admission on 11/12/2020  Decadron 6 mg p.o. daily for 10 days  Remdesivir 5-day course  Supportive care with high flow nasal cannula  Encourage proning    Acute respiratory failure with hypoxia (HCC)- (present on admission)  Assessment & Plan  RT/O2 Protocols  Titrate supplemental FiO2 to maintain SpO2 >85%  High flow nasal cannula  No evidence for scheduled inhaled beta agonists or inhaled steroids in COVID-19      Discussed patient condition and risk of morbidity and/or mortality with RN, RT, Pharmacy, Code status disscussed, Charge nurse / hot rounds, Patient and ERP.      The patient remains critically ill.  Critical care time = 39 minutes in directly providing and coordinating critical care and extensive data review.  No time overlap and excludes procedures.

## 2020-11-12 NOTE — ASSESSMENT & PLAN NOTE
In the severe fibrotic terminal stage of COVID-19 unfortunately  Continue lung protective ventilation strategies  Monitor daily P:F and compliance  Continue forced diuresis

## 2020-11-12 NOTE — ASSESSMENT & PLAN NOTE
Intubated on 11/13 (ventilator day 17)  Continue full vent support, keep PEEP at 17, continue high mandatory minute ventilation  Attempt to titrate FiO2 to goal SaO2 greater than 85%  RT/O2 protocol  Continue sedation, analgesia without wean today given degree of suffering  Continue Lasix

## 2020-11-12 NOTE — PROGRESS NOTES
2 RN Skin Check    2 RN skin check complete.   Patient skin intact and within normal limits.    Devices in place: heated high flow nasal cannula  Skin assessed under devices: yes.   Wound consult placed: N/A  The interventions in place

## 2020-11-12 NOTE — ASSESSMENT & PLAN NOTE
Discussed multiple times with the patient  Continue to address as his clinical condition progresses

## 2020-11-12 NOTE — ED NOTES
Mary [id number]:  Interpretor used to discuss necessity of lying on stomach. Pt agreeable. Pillows provided for positioning.

## 2020-11-13 PROBLEM — Z66 DNR (DO NOT RESUSCITATE): Status: RESOLVED | Noted: 2020-01-01 | Resolved: 2020-01-01

## 2020-11-13 NOTE — CARE PLAN
Problem: Pain Management  Goal: Pain level will decrease to patient's comfort goal  Outcome: PROGRESSING AS EXPECTED     Problem: Communication  Goal: The ability to communicate needs accurately and effectively will improve  Outcome: PROGRESSING AS EXPECTED     Problem: Safety  Goal: Will remain free from injury  Outcome: PROGRESSING AS EXPECTED     Problem: Bowel/Gastric:  Goal: Normal bowel function is maintained or improved  Outcome: PROGRESSING AS EXPECTED    Pt up to chair with assist x 1. Tolerated well. Preoxygenated with NRB prior to tx. Sats 83-88% while in chair. Pt had a large loose BM today. Utilizes call bell for assistance. Bed alarm set. Will continue to monitor.

## 2020-11-13 NOTE — PROGRESS NOTES
Critical Care Progress Note    Date of admission  11/12/2020    Chief Complaint  55 y.o. male admitted 11/12/2020 with COVID pneumonia and acute hypoxic respiratory failure.    Hospital Course  Mr. Costa is a 55 year old male with no past medical history who tested positive for COVID on 11/6/2020 and has progressive worsening of shortness of breath and now admitted to the ICU for acute hypoxic respiratory failure due to COVID pneumonia.  11/13 - remains on max HFNC with NRBM, remdesivir/decadron, starting lasix    Interval Problem Update  Reviewed last 24 hour events:   - transferred to ICU last night   - a/ox4   - SR 70-80s   - SBP 130s   - BM last night   - regulra diet    - UOP with urinal   - up to chair yesterday   - HFNC at 60 lpm at 100% on NRMB   - CXR(reviewed): severe bilateral diffuse opacities   - lovenox 40mg daily   - remdesivir 2/5   - blood cultures   - K 3.9   - dexamethasone 2/10 days   - BCs: 1/2 bottles with coag-neg Staph (?contaminant)    Review of Systems  Review of Systems   Constitutional: Positive for chills, fever and malaise/fatigue.   HENT: Negative for nosebleeds and sore throat.    Eyes: Negative for pain and discharge.   Respiratory: Positive for cough and shortness of breath. Negative for sputum production.    Cardiovascular: Negative for chest pain and leg swelling.   Gastrointestinal: Negative for abdominal pain, diarrhea, nausea and vomiting.   Genitourinary: Negative for frequency and urgency.   Musculoskeletal: Positive for joint pain and myalgias.   Skin: Negative for rash.   Neurological: Negative for weakness and headaches.   Endo/Heme/Allergies: Does not bruise/bleed easily.   Psychiatric/Behavioral: Negative for depression. The patient is not nervous/anxious.         Vital Signs for last 24 hours   Temp:  [36.2 °C (97.1 °F)-36.8 °C (98.2 °F)] 36.7 °C (98 °F)  Pulse:  [64-90] 90  Resp:  [21-56] 56  BP: (105-139)/(62-83) 120/64  SpO2:  [79 %-92 %] 85 %    Hemodynamic  parameters for last 24 hours       Respiratory Information for the last 24 hours       Physical Exam   Physical Exam  Vitals signs and nursing note reviewed.   Constitutional:       General: He is in acute distress.      Appearance: He is obese. He is ill-appearing. He is not toxic-appearing.   HENT:      Head: Normocephalic and atraumatic.      Right Ear: External ear normal.      Left Ear: External ear normal.      Nose: Nose normal. No rhinorrhea.      Mouth/Throat:      Mouth: Mucous membranes are moist.      Pharynx: Oropharynx is clear. No oropharyngeal exudate.      Comments: HFNC in place with NRMB  Eyes:      General: No scleral icterus.     Extraocular Movements: Extraocular movements intact.      Conjunctiva/sclera: Conjunctivae normal.      Pupils: Pupils are equal, round, and reactive to light.   Neck:      Musculoskeletal: Normal range of motion and neck supple.   Cardiovascular:      Rate and Rhythm: Normal rate and regular rhythm.      Pulses: Normal pulses.      Heart sounds: Normal heart sounds. No murmur.   Pulmonary:      Effort: Respiratory distress present.      Breath sounds: No wheezing.      Comments: Diminished bases, strong cough, tachypneic and some increased work of breathing  Chest:      Chest wall: No tenderness.   Abdominal:      General: Bowel sounds are normal. There is no distension.      Palpations: Abdomen is soft.      Tenderness: There is no abdominal tenderness. There is no guarding or rebound.   Musculoskeletal: Normal range of motion.      Right lower leg: No edema.      Left lower leg: No edema.   Skin:     General: Skin is warm and dry.      Capillary Refill: Capillary refill takes less than 2 seconds.      Findings: No rash.   Neurological:      Mental Status: He is alert and oriented to person, place, and time.      Cranial Nerves: No cranial nerve deficit.      Sensory: No sensory deficit.      Motor: No weakness.   Psychiatric:         Behavior: Behavior normal.       Comments: Mild anxiety         Medications  Current Facility-Administered Medications   Medication Dose Route Frequency Provider Last Rate Last Admin   • senna-docusate (PERICOLACE or SENOKOT S) 8.6-50 MG per tablet 2 Tab  2 Tab Oral BID EMANUEL Parker Jr..ODana   Stopped at 11/12/20 1800    And   • polyethylene glycol/lytes (MIRALAX) PACKET 1 Packet  1 Packet Oral QDAY PRN EMANUEL Parker Jr..ODana        And   • magnesium hydroxide (MILK OF MAGNESIA) suspension 30 mL  30 mL Oral QDAY PRN EMANUEL Parker Jr..ODana        And   • bisacodyl (DULCOLAX) suppository 10 mg  10 mg Rectal QDAY PRN EMANUEL Parker Jr..O.       • Respiratory Therapy Consult   Nebulization Continuous RT EMANUEL Parker Jr..O.       • acetaminophen (Tylenol) tablet 650 mg  650 mg Oral Q6HRS PRN EMANUEL Parker Jr..O.   650 mg at 11/12/20 0749   • Pharmacy Consult Request ...Pain Management Review 1 Each  1 Each Other PHARMACY TO DOSE EMANUEL Parker Jr..MU.        And   • oxyCODONE immediate-release (ROXICODONE) tablet 2.5 mg  2.5 mg Oral Q3HRS PRN EMANUEL Parker Jr..O.        And   • oxyCODONE immediate-release (ROXICODONE) tablet 5 mg  5 mg Oral Q3HRS PRN EMANUEL Parker Jr..O.        And   • morphine (pf) 4 mg/mL injection 2 mg  2 mg Intravenous Q3HRS PRN EMANUEL Parker Jr..O.       • promethazine (PHENERGAN) tablet 12.5-25 mg  12.5-25 mg Oral Q4HRS PRN EMANUEL Parker Jr..O.       • promethazine (PHENERGAN) suppository 12.5-25 mg  12.5-25 mg Rectal Q4HRS PRN EMANUEL Parker Jr..O.       • prochlorperazine (COMPAZINE) injection 5-10 mg  5-10 mg Intravenous Q4HRS PRN EMANUEL Parker Jr..O.       • ondansetron (ZOFRAN) syringe/vial injection 4 mg  4 mg Intravenous Q6HRS PRN Pablito Mills Jr., D.O.       • ondansetron (ZOFRAN ODT) dispertab 4 mg  4 mg Oral Q6HRS PRN Pablito Mills Jr., D.O.       • enoxaparin (LOVENOX) inj 40 mg  40 mg Subcutaneous DAILY Pablito Mills Jr., D.O.   40 mg at 11/13/20 0556   •  dexamethasone (DECADRON) injection 6 mg  6 mg Intravenous DAILY Pablito Mills Jr., D.O.   6 mg at 11/13/20 0555   • remdesivir 100 mg in  mL IVPB  100 mg Intravenous DAILY AT 1800 Pablito Mills Jr., D.O.       • benzonatate (TESSALON) capsule 200 mg  200 mg Oral TID PRN Pablito Mills Jr., D.O.   200 mg at 11/12/20 1828   • MD Alert...ICU Electrolyte Replacement per Pharmacy   Other PHARMACY TO DOSE Adalberto Galeana M.D.           Fluids    Intake/Output Summary (Last 24 hours) at 11/13/2020 0655  Last data filed at 11/13/2020 0200  Gross per 24 hour   Intake 370 ml   Output 875 ml   Net -505 ml       Laboratory          Recent Labs     11/12/20 0023   SODIUM 135   POTASSIUM 3.3*   CHLORIDE 99   CO2 20   BUN 16   CREATININE 0.74   CALCIUM 9.1     Recent Labs     11/12/20 0023   ALTSGPT 88*   ASTSGOT 72*   ALKPHOSPHAT 80   TBILIRUBIN 0.8   GLUCOSE 129*     Recent Labs     11/12/20 0023 11/13/20  0600   WBC 10.1 13.4*   NEUTSPOLYS 87.00* 91.80*   LYMPHOCYTES 11.30* 5.80*   MONOCYTES 0.00 1.00   EOSINOPHILS 0.00 0.10   BASOPHILS 0.00 0.10   ASTSGOT 72*  --    ALTSGPT 88*  --    ALKPHOSPHAT 80  --    TBILIRUBIN 0.8  --      Recent Labs     11/12/20 0023 11/13/20  0600   RBC 4.96 5.02   HEMOGLOBIN 14.9 15.0   HEMATOCRIT 43.9 44.5   PLATELETCT 270 271       Imaging  Reviewed    Assessment/Plan  Transaminitis- (present on admission)  Assessment & Plan  Likely 2/2 COVID-19  Avoid hepatotoxins  Monitor synthetic function.    Hypokalemia- (present on admission)  Assessment & Plan  Replete to maintain > 4    Acute respiratory distress syndrome (ARDS) due to COVID-19 virus (HCC)  Assessment & Plan  Meets diagnostic requirements by [place] criteria  Bilateral, multifocal opacities.  Considered unlikely to be cardiac  Onset < 7 days  PaO2/FiO2 <300  Start Lasix 20mg IV BID  Self-proning    COVID-19 virus infection- (present on admission)  Assessment & Plan  Detected on 11/6/2020  Worsening symptoms prompting  admission on 11/12/2020  Decadron 6 mg p.o. daily for 10 days  Remdesivir 5-day course  Supportive care with high flow nasal cannula  Encourage proning as much as possible  O2 sats > 85%  Start aggressive diuresis of lasix 20mg IV BID    Acute respiratory failure with hypoxia (HCC)- (present on admission)  Assessment & Plan  RT/O2 Protocols  Titrate supplemental FiO2 to maintain SpO2 >85%  High flow nasal cannula +/- NRMB  No evidence for scheduled inhaled beta agonists or inhaled steroids in COVID-19  Encourage self-proning  Precedex for anxiety and assisting with work of breathing  Start aggressive diuresis  Low threshold to intubate       VTE:  Lovenox  Ulcer: Not Indicated  Lines: None    I have performed a physical exam and reviewed and updated ROS and Plan today (11/13/2020). In review of yesterday's note (11/12/2020), there are no changes except as documented above.     Discussed patient condition and risk of morbidity and/or mortality with RN, RT, Pharmacy, Charge nurse / hot rounds and Patient    The patient remains critically ill with severe acute hypoxic respiratory failure due to COVID-19 pneumonia requiring active titration of HFNC with NRMB and now active titration of Precedex for anxiety.      Critical care time = 38 minutes in directly providing and coordinating critical care and extensive data review.  No time overlap and excludes procedures.

## 2020-11-14 PROBLEM — R57.9 SHOCK (HCC): Status: ACTIVE | Noted: 2020-01-01

## 2020-11-14 NOTE — DIETARY
"Nutrition Support Assessment:  Day 2 of admit.  Iain Costa is a 55 y.o. male with admitting DX of acute respiratory failure with hypoxia.      Current problem list:  1. Acute respiratory failure with hypoxia  2. COVID-19  3. ARDS due to COVID-19  4. Hypokalemia  5. Transaminitis      Assessment:  Estimated Nutritional Needs based on:   Height: 172.7 cm (5' 8\")  Weight: 102.5 kg (225 lb 15.5 oz)  Weight to Use in Calculations: 101.9 kg (224 lb 10.4 oz) - suspected most accurate, obtained on admit   Ideal Body Weight: 69.9 kg (154 lb)  Percent Ideal Body Weight: 145.9  Body mass index is 34.16 kg/m²., BMI classification: obese, class I     Calculation/Equation:    MSJ x 1.0 = 1825 kcal/day (65-70% = 1186 - 1278 kcal/day)   PSU (vent: 12.8 L/min; Tmax: 38.1 C) = 2298 kcal/day (65-70% = 1494 - 1609 kcal/day)  Total Calories / day: 1300 - 1600 (Calories / k - 16)  Total Grams Protein / day: 105 - 140 (Grams Protein / kg IBW: 1.5 - 2.0+; 1.0 - 1.4 gm/kg)     Evaluation:   1. Pt intubated last night for worsening hypoxemia, TF consult received   2. No Cortrak placed at this time  3. On day 2 of admit, was on regular diet and eating % of meals per ADL documentation  4. MAR: decadron, pepcid, lasix, SSI, ICU electrolyte replacement, remdesivir, bowel meds, vecuronium @ 0.8 mcg/kg/min, fentanyl, levophed @ 6 mcg/min, propofol @ 70 mcg/kg/min (43.1 ml/hr = 1138 kcal/day)   5. Labs: glucose 165, BUN 24, phos 6.6, triglycerides 335  6. GI: last BM   7. Currently on continuous paralytic and being proned - not indicators to hold TF  8. Estimated needs per COVID-19 guidelines / [employer] obesity guidelines due to BMI > 30  9. Propofol currently providing 1138 kcals, will adjust TF rate accordingly; due to difficulty meeting increased protein needs indicated with hypocaloric feeds will feed closer to REE until propofol requirements are lower.   10. Peptamen Intense VHP appropriate to meet estimated needs   "   Malnutrition Risk: Unable to fully assess - does not appear to meet criteria at this time.      Recommendations/Plan:  1. Goal ON propofol: Peptamen Intense VHP @ 45 ml/hr to provide 1080 kcal (+ kcal from prop), 99 gm protein (1.4 gm/kg IBW), and 907 ml free water  2. Goal OFF propofol: Peptamen Intense VHP @ 60 ml/hr to provide 1440 kcal, 133 gm protein (1.9 gm/kg IBW), and 1210 ml free water per day  3. TF to start per MD / once Cortrak is placed   4. Monitor phos levels and adjust TF recommendations if indicated, elevated @ 6.6 today  5. RD to monitor propofol      RD to follow

## 2020-11-14 NOTE — PROGRESS NOTES
"I was called for severe hypoxemia.  SaO2 in the 60s/70s on max HHFO and NRB.  I spoke to patient via .  Initially patient was resistant to intubation \"because my lungs have been like this for 50 years,\" but then after a long discussion he assented to the procedure.  I spoke to his wife and daughter as well and explained his very high mortality risk even with mechanical ventilation, but I think it's very reasonable to intubate given his young age and lack of comorbid conditions.  I then intubated the patient.  Despite mechanical ventilation he remained severely hypoxemic with SaO2 around 80% so I liberalized the tidal volume to 8cc/kg while we prepped to prone.  With any awakening or interaction with the vent he dropped into the low 70s so I gave a vecuronium bolus then start a gtt.  Required high dose propofol for pre-paralytic sedation and this caused hypotension so I started a levophed drip.  I then proned the patient and made multiple adjustments to the ventilator.  Initial ABG pre-proning had a P:F of 47. Post-proning the PaO2 had increased to the 80s, but he needed a RR increase due to respiratory acidosis.  Bedside TTE showed preserved LV function, dilated RV but with normal TAPSE and free wall movement. Unable to get a subcostal view.     The patient remains critically ill on the ventilator.  Critical care time = 55 minutes in directly providing and coordinating critical care and extensive data review.  No time overlap and excludes procedures.    "

## 2020-11-14 NOTE — PROCEDURES
Intubation    Date/Time: 11/13/2020 7:50 PM  Performed by: Mahsa Reis M.D.  Authorized by: Mahsa Reis M.D.     Consent:     Consent obtained:  Emergent situation (discussed with wife/daughter, but did not have time to do a complete informed consent)    Alternatives discussed:  No treatment and delayed treatment  Pre-procedure details:     Patient status:  Awake    Pretreatment meds: etomidate.    Paralytics:  Rocuronium  Procedure details:     Preoxygenation:  Nonrebreather mask    CPR in progress: no      Intubation method:  Oral    Oral intubation technique:  Video-assisted    Laryngoscope type:  GlideScope    Laryngoscope blade:  Mac 4    Cormack-Lehane Classification:  Grade 1    Tube size (mm):  8.0    Tube type:  Cuffed    Number of attempts:  1    Ventilation between attempts: ventilation before intubation.      Cricoid pressure: no      Tube visualized through cords: yes    Placement assessment:     ETT to teeth:  25    Tube secured with:  ETT avelar and adhesive tape    Breath sounds:  Equal and absent over the epigastrium    Placement verification: chest rise, condensation, CXR verification, direct visualization, equal breath sounds, ETCO2 detector and tube exhalation      CXR findings:  ETT in proper place  Post-procedure details:     Patient tolerance of procedure:  Tolerated well, no immediate complications  Comments:      Starting SaO2 73, lowest SaO2 during procedure was 49% but came up within a minute

## 2020-11-14 NOTE — RESPIRATORY CARE
Assisted MD with intubation, 8.0 ETT cuff checked and stylet in place,  Pre oxygenate wit highflow and NRB, a GIldescope #4 was used, ETT taped at 25 at the teeth, Pending CXR.     1840:Pt placed on Vent  APVCMV 24/410/+14/100% Sp02 in the 80's. Increased Vt to 8cc/kg (550) per MD while we get ready to prone.    2028: PT vt placed back on 6cc/kg, (410)    2132: ABG drawn per MD increase RR to 30 pH-7.17 Co2-70 Po2-84

## 2020-11-14 NOTE — PROGRESS NOTES
Critical Care Progress Note    Date of admission  11/12/2020    Chief Complaint  55 y.o. male admitted 11/12/2020 with COVID pneumonia and acute hypoxic respiratory failure.    Hospital Course  Mr. Costa is a 55 year old male with no past medical history who tested positive for COVID on 11/6/2020 and has progressive worsening of shortness of breath and now admitted to the ICU for acute hypoxic respiratory failure due to COVID pneumonia.  11/13 - remains on max HFNC with NRBM, remdesivir/decadron, starting lasix, intubated and proned/paralyzed  11/14 - full vent support, 16/70%, diuresing well with lasix, P/F ratio 178, compliance 35, cont proning protocol    Interval Problem Update  Reviewed last 24 hour events:   - intubated last night   - paralyzed and proned   - pt supine this morning at 0830   - SR/ST 80-120s   - -130s   - Levo at 6   - Vec at 0.8   - Prop at 70   - Fent at 100   - no fevers   - BM on 11/12   - NPO   - UOP of 1450cc this morning after lasix with underwood   - vent day #2   - CXR(reviewed): bilateral diffuse opacities R>L   - PEEP at 16 and FiO2 at 70%   - pepcid/lovenox   - day #3/5 of remdesivir   - day #3/10 of dexamethazone    Yesterday's Events:   - transferred to ICU last night   - a/ox4   - SR 70-80s   - SBP 130s   - BM last night   - regulra diet    - UOP with urinal   - up to chair yesterday   - HFNC at 60 lpm at 100% on NRMB   - CXR(reviewed): severe bilateral diffuse opacities   - lovenox 40mg daily   - remdesivir 2/5   - K 3.9   - dexamethasone 2/10 days   - BCs: 1/2 bottles with coag-neg Staph (?contaminant)    Review of Systems  Review of Systems   Unable to perform ROS: Intubated        Vital Signs for last 24 hours   Temp:  [36.6 °C (97.9 °F)-37.6 °C (99.7 °F)] 37.6 °C (99.7 °F)  Pulse:  [] 102  Resp:  [21-51] 32  BP: ()/() 118/78  SpO2:  [79 %-97 %] 96 %    Hemodynamic parameters for last 24 hours       Respiratory Information for the last 24 hours  Vent  Mode: APVCMV  Rate (breaths/min): 30  Vt Target (mL): 410  PEEP/CPAP: 16  MAP: 21  Control VTE (exp VT): 410    Physical Exam   Physical Exam  Vitals signs and nursing note reviewed.   Constitutional:       Appearance: He is ill-appearing and diaphoretic. He is not toxic-appearing.      Comments: Sedated/paralyzed   HENT:      Head: Normocephalic and atraumatic.      Right Ear: External ear normal.      Left Ear: External ear normal.      Nose: Nose normal. No rhinorrhea.      Mouth/Throat:      Mouth: Mucous membranes are moist.      Comments: ETT in place  Eyes:      General: No scleral icterus.     Conjunctiva/sclera: Conjunctivae normal.      Comments: Periorbital edema   Neck:      Musculoskeletal: Normal range of motion and neck supple.      Comments: Right IJ TLC  Cardiovascular:      Rate and Rhythm: Normal rate and regular rhythm.      Pulses: Normal pulses.      Heart sounds: Normal heart sounds. No murmur.   Pulmonary:      Breath sounds: No wheezing.      Comments: Breathing comfortably on the vent, clear but diminished  Chest:      Chest wall: No tenderness.   Abdominal:      Palpations: Abdomen is soft.      Tenderness: There is no abdominal tenderness. There is no guarding or rebound.      Comments: Hypoactive bowel sounds   Musculoskeletal: Normal range of motion.         General: No tenderness.      Right lower leg: No edema.      Left lower leg: No edema.   Lymphadenopathy:      Cervical: No cervical adenopathy.   Skin:     General: Skin is warm.      Capillary Refill: Capillary refill takes less than 2 seconds.      Findings: No rash.   Neurological:      Comments: Sedated and paralyzed   Psychiatric:      Comments: Unable to assess         Medications  Current Facility-Administered Medications   Medication Dose Route Frequency Provider Last Rate Last Admin   • furosemide (LASIX) injection 20 mg  20 mg Intravenous BID DIURETIC Patti Park M.D.   20 mg at 11/14/20 0523   • Respiratory Therapy  Consult   Nebulization Continuous RT Mahsa Reis M.D.       • ipratropium-albuterol (DUONEB) nebulizer solution  3 mL Nebulization Q2HRS PRN (RT) Mahsa Reis M.D.       • famotidine (PEPCID) tablet 20 mg  20 mg Enteral Tube Q12HRS Mahsa Reis M.D.        Or   • famotidine (PEPCID) injection 20 mg  20 mg Intravenous Q12HRS Mahsa Reis M.D.   20 mg at 11/14/20 0516   • Pharmacy Consult: Enteral tube insertion - review meds/change route/product selection  1 Each Other PHARMACY TO DOSE Mahsa Reis M.D.       • fentaNYL (SUBLIMAZE) injection 50 mcg  50 mcg Intravenous Q15 MIN PRN Mahsa Reis M.D.        And   • fentaNYL (SUBLIMAZE) injection 100 mcg  100 mcg Intravenous Q15 MIN PRN Mahsa Reis M.D.   100 mcg at 11/13/20 2008    And   • fentaNYL (SUBLIMAZE) 50 mcg/mL in 50mL (Continuous Infusion)   Intravenous Continuous Mahsa Reis M.D. 2 mL/hr at 11/13/20 2149 100 mcg/hr at 11/13/20 2149   • insulin regular (HumuLIN R,NovoLIN R) injection  1-6 Units Subcutaneous Q6HRS Mahsa Reis M.D.   1 Units at 11/14/20 0525    And   • dextrose 50% (D50W) injection 50 mL  50 mL Intravenous Q15 MIN PRN Mahsa Reis M.D.       • senna-docusate (PERICOLACE or SENOKOT S) 8.6-50 MG per tablet 2 Tab  2 Tab Enteral Tube BID Mahsa Reis M.D.   Stopped at 11/14/20 0600    And   • polyethylene glycol/lytes (MIRALAX) PACKET 1 Packet  1 Packet Enteral Tube QDAY PRN Mahsa Reis M.D.        And   • magnesium hydroxide (MILK OF MAGNESIA) suspension 30 mL  30 mL Enteral Tube QDAY PRN Mahsa Reis M.D.        And   • bisacodyl (DULCOLAX) suppository 10 mg  10 mg Rectal QDAY PRN Mahsa Reis M.D.       • acetaminophen (Tylenol) tablet 650 mg  650 mg Enteral Tube Q6HRS PRN Mahsa Reis M.D.       • ondansetron (ZOFRAN ODT) dispertab 4 mg  4 mg Enteral Tube Q6HRS PRN Mahsa Reis M.D.       • artificial tears (EYE LUBRICANT) ophth ointment 1 Application  1 Application Both Eyes Q8HRS Mahsa BRANCH  SALLIE Reis   1 Application at 11/14/20 0509   • vecuronium (NORCURON) 60 mg in 5% dextrose 500 mL Infusion  0-1.7 mcg/kg/min Intravenous Continuous Mahsa Reis M.D. 41 mL/hr at 11/14/20 0000 0.8 mcg/kg/min at 11/14/20 0000   • rocuronium (ZEMURON) injection 100 mg  100 mg Intravenous Once Mahsa Reis M.D.       • fentaNYL (SUBLIMAZE) injection 100 mcg  100 mcg Intravenous Once Mahsa Reis M.D.       • propofol (DIPRIVAN) injection  0-80 mcg/kg/min Intravenous Continuous Mahsa Reis M.D. 43.1 mL/hr at 11/14/20 0907 70 mcg/kg/min at 11/14/20 0907   • vecuronium (NORCURON) injection 10 mg  10 mg Intravenous Once Mahsa Reis M.D.   Stopped at 11/13/20 2215   • vecuronium (NORCURON) injection 10 mg  10 mg Intravenous Q2HRS PRN Mahsa Reis M.D.       • norepinephrine (Levophed) infusion 8 mg/250 mL (premix)  0-30 mcg/min Intravenous Continuous Mahsa Reis M.D. 11.3 mL/hr at 11/14/20 0400 6 mcg/min at 11/14/20 0400   • promethazine (PHENERGAN) tablet 12.5-25 mg  12.5-25 mg Oral Q4HRS PRN Pablito Mills Jr., D.O.       • promethazine (PHENERGAN) suppository 12.5-25 mg  12.5-25 mg Rectal Q4HRS PRN Pablito Mills Jr., D.O.       • prochlorperazine (COMPAZINE) injection 5-10 mg  5-10 mg Intravenous Q4HRS PRN Pablito Mills Jr., D.O.       • ondansetron (ZOFRAN) syringe/vial injection 4 mg  4 mg Intravenous Q6HRS PRN Pablito Mills Jr., D.O.       • enoxaparin (LOVENOX) inj 40 mg  40 mg Subcutaneous DAILY Pablito Mills Jr., D.O.   40 mg at 11/14/20 0515   • dexamethasone (DECADRON) injection 6 mg  6 mg Intravenous DAILY Pablito Mills Jr., D.O.   6 mg at 11/14/20 0509   • remdesivir 100 mg in  mL IVPB  100 mg Intravenous DAILY AT 1800 Pablito Mills Jr., D.O.   Stopped at 11/13/20 2306   • MD Alert...ICU Electrolyte Replacement per Pharmacy   Other PHARMACY TO DOSE Adalberto Galeana M.D.           Fluids    Intake/Output Summary (Last 24 hours) at 11/14/2020 1012  Last data filed at  11/14/2020 0632  Gross per 24 hour   Intake 1202.2 ml   Output 2675 ml   Net -1472.8 ml       Laboratory  Recent Labs     11/13/20  1910 11/14/20  0349   ISTATAPH 7.351* 7.156*   ISTATAPCO2 40.0* 78.7*   ISTATAPO2 47* 178*   ISTATATCO2 23 30   [id number] 81* 99   ISTATARTHCO3 22.1 27.8*   ISTATARTBE -3 -3   ISTATTEMP 98.0 F 37.9 C   ISTATFIO2 100 100   ISTATSPEC Arterial Arterial   ISTATAPHTC 7.356* 7.144*   [id number] 46* 183*         Recent Labs     11/12/20 0023 11/13/20 0600 11/14/20  0040   SODIUM 135 138 137   POTASSIUM 3.3* 3.9 4.8   CHLORIDE 99 105 100   CO2 20 21 26   BUN 16 19 24*   CREATININE 0.74 0.67 0.83   MAGNESIUM  --   --  2.5   PHOSPHORUS  --   --  6.6*   CALCIUM 9.1 8.7 8.5     Recent Labs     11/12/20 0023 11/13/20  0600 11/14/20  0040   ALTSGPT 88* 65* 53*   ASTSGOT 72* 52* 52*   ALKPHOSPHAT 80 74 86   TBILIRUBIN 0.8 0.7 0.9   GLUCOSE 129* 115* 165*     Recent Labs     11/12/20 0023 11/13/20 0600 11/14/20  0040 11/14/20  0526   WBC 10.1 13.4*  --  13.2*   NEUTSPOLYS 87.00* 91.80*  --  92.20*   LYMPHOCYTES 11.30* 5.80*  --  5.20*   MONOCYTES 0.00 1.00  --  0.90   EOSINOPHILS 0.00 0.10  --  0.00   BASOPHILS 0.00 0.10  --  0.00   ASTSGOT 72* 52* 52*  --    ALTSGPT 88* 65* 53*  --    ALKPHOSPHAT 80 74 86  --    TBILIRUBIN 0.8 0.7 0.9  --      Recent Labs     11/12/20 0023 11/13/20  0600 11/14/20  0526   RBC 4.96 5.02 5.10   HEMOGLOBIN 14.9 15.0 15.4   HEMATOCRIT 43.9 44.5 48.1   PLATELETCT 270 271 296       Imaging  Reviewed    Assessment/Plan  Shock (HCC)  Assessment & Plan  Likely multifactorial:  Sedatives, COVID  Cont vasopressor therapies to maintain MAP goal > 65  I am actively titrating levophed for MAP goal    Transaminitis- (present on admission)  Assessment & Plan  Likely 2/2 COVID-19  Avoid hepatotoxins  Monitor synthetic function.    Hypokalemia- (present on admission)  Assessment & Plan  Replete to maintain > 4    Acute respiratory distress syndrome (ARDS) due to COVID-19 virus  (HCC)  Assessment & Plan  Meets diagnostic requirements by [place] criteria  Bilateral, multifocal opacities.  Considered unlikely to be cardiac  Onset < 7 days  PaO2/FiO2 <300  Cont Lasix 20mg IV BID  Paralytics/proning protocol    COVID-19 virus infection- (present on admission)  Assessment & Plan  Detected on 11/6/2020  Worsening symptoms prompting admission on 11/12/2020  Decadron 6 mg p.o. daily for 10 days  Remdesivir 5-day course  Lung protective mechanical ventilation strategies  Paralytics and proning protocols  O2 sats > 85%  Cont  aggressive diuresis of lasix 20mg IV BID    Acute respiratory failure with hypoxia (HCC)- (present on admission)  Assessment & Plan  Worsened and required intubation on 11/13  Paralytics and proning protocols  RT/O2 protocols  Low volume protective ventilation strategies  Continue aggressive diuresis  SBTs/SAT when off proning/paralytic protocol       VTE:  Lovenox  Ulcer: H2 Antagonist  Lines: Central Line  Ongoing indication addressed, Arterial Line  Ongoing indication addressed and Sawyer Catheter  Ongoing indication addressed    I have performed a physical exam and reviewed and updated ROS and Plan today (11/14/2020). In review of yesterday's note (11/13/2020), there are no changes except as documented above.     Discussed patient condition and risk of morbidity and/or mortality with RN, RT, Pharmacy, Charge nurse / hot rounds and Patient    The patient is critically ill at this time with severe acute hypoxic respiratory failure due to ARDS from COVID-19 pneumonia requiring full ventilatory support as well as paralyzing agents and the proning protocol.  The patient remains at high risk of clinical deterioration, worsening vital organ dysfunction, and death without the above critical care interventions.    Critical care time = 98 minutes in directly providing and coordinating critical care and extensive data review.  No time overlap and excludes procedures.

## 2020-11-14 NOTE — PROCEDURES
Central Line Insertion    Date/Time: 11/13/2020 7:57 PM  Performed by: Mahsa Reis M.D.  Authorized by: Mahsa Reis M.D.     Consent:     Consent obtained:  Emergent situation  Pre-procedure details:     Hand hygiene: Hand hygiene performed prior to insertion      Sterile barrier technique: All elements of maximal sterile technique followed      Skin preparation:  ChloraPrep    Skin preparation agent: Skin preparation agent completely dried prior to procedure    Sedation:     Sedation type:  None  Anesthesia:     Anesthesia method:  Local infiltration    Local anesthetic:  Lidocaine 1% w/o epi  Procedure details:     Location:  L internal jugular    Patient position:  Trendelenburg    Procedural supplies:  Triple lumen    Catheter size:  7 Fr    Landmarks identified: yes      Ultrasound guidance: yes      Sterile ultrasound techniques: Sterile gel and sterile probe covers were used      Number of attempts:  1    Successful placement: yes    Post-procedure details:     Post-procedure:  Dressing applied and line sutured    Assessment:  Blood return through all ports, no pneumothorax on x-ray, placement verified by x-ray and free fluid flow    Patient tolerance of procedure:  Tolerated well, no immediate complications

## 2020-11-14 NOTE — PROCEDURES
"Arterial Line Insertion    Date/Time: 11/13/2020 7:56 PM  Performed by: Mahsa Reis M.D.  Authorized by: Mahsa Reis M.D.   Consent: The procedure was performed in an emergent situation.  Patient identity confirmed: arm band  Time out: Immediately prior to procedure a \"time out\" was called to verify the correct patient, procedure, equipment, support staff and site/side marked as required.  Preparation: Patient was prepped and draped in the usual sterile fashion.  Indications: multiple ABGs, respiratory failure and hemodynamic monitoring  Location: left radial  Needle gauge: 20  Seldinger technique: Seldinger technique used  Number of attempts: 1  Post-procedure: line sutured  Post-procedure CMS: unchanged  Patient tolerance: Patient tolerated the procedure well with no immediate complications              "

## 2020-11-15 NOTE — PROGRESS NOTES
Critical Care Progress Note    Date of admission  11/12/2020    Chief Complaint  55 y.o. male admitted 11/12/2020 with COVID pneumonia and acute hypoxic respiratory failure.    Hospital Course  Mr. Costa is a 55 year old male with no past medical history who tested positive for COVID on 11/6/2020 and has progressive worsening of shortness of breath and now admitted to the ICU for acute hypoxic respiratory failure due to COVID pneumonia.  11/13 - remains on max HFNC with NRBM, remdesivir/decadron, starting lasix, intubated and proned/paralyzed  11/14 - full vent support, 16/70%, diuresing well with lasix, P/F ratio 178, compliance 35, cont proning protocol  11/15 - full vent support, 16/60%, diuresing well, P/F ratio 180, compliance 38, cont proning protocol tonight     Interval Problem Update  Reviewed last 24 hour events:   - proned last afternoon and now supine this morning   - sedated/paralyzed   - Prop at 70   - Fent at 100   - SR 80-90s   - SBP 80-90s   - generalized edema   - Tmax 37.2   - adequate UOP with underwood (2.4 liters in 24 hours)   - vec at 0.6   - levo at 6   - vent day #3   - CXR(reviewed): continue diffuse bilateral opacities   - PEEP at 16 and FiO2 60%   - lovenoex/pepcid   - day 4/10 of decadron   - day 4/5 of remdesivir    Yesterday's Events:   - intubated last night   - paralyzed and proned   - pt supine this morning at 0830   - SR/ST 80-120s   - -130s   - Levo at 6   - Vec at 0.8   - Prop at 70   - Fent at 100   - no fevers   - BM on 11/12   - NPO   - UOP of 1450cc this morning after lasix with underwood   - vent day #2   - CXR(reviewed): bilateral diffuse opacities R>L   - PEEP at 16 and FiO2 at 70%   - pepcid/lovenox   - day #3/5 of remdesivir   - day #3/10 of dexamethazone      Review of Systems  Review of Systems   Unable to perform ROS: Intubated        Vital Signs for last 24 hours   Pulse:  [] 93  Resp:  [20-32] 32  BP: ()/(64-74) 106/67  SpO2:  [87 %-99 %] 95  %    Hemodynamic parameters for last 24 hours       Respiratory Information for the last 24 hours  Vent Mode: APVCMV  Rate (breaths/min): 32  Vt Target (mL): 410  PEEP/CPAP: 16  MAP: 20  Control VTE (exp VT): 407    Physical Exam   Physical Exam  Vitals signs and nursing note reviewed.   Constitutional:       Appearance: He is ill-appearing and diaphoretic. He is not toxic-appearing.      Comments: Sedated/paralyzed   HENT:      Head: Normocephalic and atraumatic.      Right Ear: External ear normal.      Left Ear: External ear normal.      Nose: Nose normal. No rhinorrhea.      Mouth/Throat:      Mouth: Mucous membranes are moist.      Comments: ETT in place  Eyes:      General: No scleral icterus.     Conjunctiva/sclera: Conjunctivae normal.      Comments: Periorbital edema   Neck:      Musculoskeletal: Normal range of motion and neck supple.      Comments: Right IJ TLC  Cardiovascular:      Rate and Rhythm: Normal rate and regular rhythm.      Pulses: Normal pulses.      Heart sounds: Normal heart sounds. No murmur.   Pulmonary:      Breath sounds: No wheezing.      Comments: Breathing comfortably on the vent, clear but diminished  Chest:      Chest wall: No tenderness.   Abdominal:      Palpations: Abdomen is soft.      Tenderness: There is no abdominal tenderness. There is no guarding or rebound.      Comments: Hypoactive bowel sounds   Musculoskeletal: Normal range of motion.         General: No tenderness.      Right lower leg: No edema.      Left lower leg: No edema.   Lymphadenopathy:      Cervical: No cervical adenopathy.   Skin:     General: Skin is warm.      Capillary Refill: Capillary refill takes less than 2 seconds.      Findings: No rash.   Neurological:      Comments: Sedated and paralyzed   Psychiatric:      Comments: Unable to assess     no change to exam    Medications  Current Facility-Administered Medications   Medication Dose Route Frequency Provider Last Rate Last Admin   • phosphorus  (K-Phos-Neutral) per tablet 500 mg  500 mg Enteral Tube Once Patti Park M.D.       • vecuronium (NORCURON) injection 10 mg  10 mg Intravenous Once Patti Park M.D.       • fentaNYL (SUBLIMAZE) 50 mcg/mL in 50mL (Continuous Infusion)   Intravenous Continuous Patti Park M.D. 2 mL/hr at 11/14/20 1123 100 mcg/hr at 11/14/20 1123   • propofol (DIPRIVAN) injection  0-80 mcg/kg/min Intravenous Continuous Patti Park M.D. 43.1 mL/hr at 11/15/20 0620 70 mcg/kg/min at 11/15/20 0620   • furosemide (LASIX) injection 20 mg  20 mg Intravenous BID DIURETIC Patti Park M.D.   20 mg at 11/15/20 0618   • Respiratory Therapy Consult   Nebulization Continuous RT Mahsa Reis M.D.       • ipratropium-albuterol (DUONEB) nebulizer solution  3 mL Nebulization Q2HRS PRN (RT) Mahsa Reis M.D.       • famotidine (PEPCID) tablet 20 mg  20 mg Enteral Tube Q12HRS Mahsa Reis M.D.        Or   • famotidine (PEPCID) injection 20 mg  20 mg Intravenous Q12HRS Mahsa Reis M.D.   20 mg at 11/15/20 0617   • Pharmacy Consult: Enteral tube insertion - review meds/change route/product selection  1 Each Other PHARMACY TO DOSE Mahsa Reis M.D.       • fentaNYL (SUBLIMAZE) injection 50 mcg  50 mcg Intravenous Q15 MIN PRN Mahsa Reis M.D.        And   • fentaNYL (SUBLIMAZE) injection 100 mcg  100 mcg Intravenous Q15 MIN PRN Mahsa Reis M.D.   100 mcg at 11/13/20 2008   • insulin regular (HumuLIN R,NovoLIN R) injection  1-6 Units Subcutaneous Q6HRS Mahsa Reis M.D.   Stopped at 11/15/20 0000    And   • dextrose 50% (D50W) injection 50 mL  50 mL Intravenous Q15 MIN PRN Mahsa Reis M.D.       • senna-docusate (PERICOLACE or SENOKOT S) 8.6-50 MG per tablet 2 Tab  2 Tab Enteral Tube BID Mahsa Reis M.D.   Stopped at 11/14/20 0600    And   • polyethylene glycol/lytes (MIRALAX) PACKET 1 Packet  1 Packet Enteral Tube QDAY PRN Mahsa Reis M.D.        And   • magnesium hydroxide (MILK OF  MAGNESIA) suspension 30 mL  30 mL Enteral Tube QDAY PRN Mahsa Reis M.D.        And   • bisacodyl (DULCOLAX) suppository 10 mg  10 mg Rectal QDAY PRN Mahsa Reis M.D.       • acetaminophen (Tylenol) tablet 650 mg  650 mg Enteral Tube Q6HRS PRN Mahsa Reis M.D.       • ondansetron (ZOFRAN ODT) dispertab 4 mg  4 mg Enteral Tube Q6HRS PRN Mahsa Reis M.D.       • artificial tears (EYE LUBRICANT) ophth ointment 1 Application  1 Application Both Eyes Q8HRS Mahsa Reis M.D.   1 Application at 11/15/20 0600   • vecuronium (NORCURON) 60 mg in 5% dextrose 500 mL Infusion  0-1.7 mcg/kg/min Intravenous Continuous Mahsa Reis M.D. 30.8 mL/hr at 11/14/20 1616 0.6 mcg/kg/min at 11/14/20 1616   • vecuronium (NORCURON) injection 10 mg  10 mg Intravenous Q2HRS PRN Mahsa Reis M.D.       • norepinephrine (Levophed) infusion 8 mg/250 mL (premix)  0-30 mcg/min Intravenous Continuous Patti Park M.D. 11.3 mL/hr at 11/14/20 1603 6 mcg/min at 11/14/20 1603   • promethazine (PHENERGAN) tablet 12.5-25 mg  12.5-25 mg Oral Q4HRS PRN Pablito Mills Jr., D.O.       • promethazine (PHENERGAN) suppository 12.5-25 mg  12.5-25 mg Rectal Q4HRS PRN Pablito Mills Jr., D.O.       • prochlorperazine (COMPAZINE) injection 5-10 mg  5-10 mg Intravenous Q4HRS PRN Pablito Mills Jr., D.O.       • ondansetron (ZOFRAN) syringe/vial injection 4 mg  4 mg Intravenous Q6HRS PRN Pablito Mills Jr. D.O.       • enoxaparin (LOVENOX) inj 40 mg  40 mg Subcutaneous DAILY Pbalito Mills Jr., D.O.   40 mg at 11/15/20 0622   • dexamethasone (DECADRON) injection 6 mg  6 mg Intravenous DAILY Pablito Mills Jr., D.O.   6 mg at 11/15/20 0617   • remdesivir 100 mg in  mL IVPB  100 mg Intravenous DAILY AT 1800 Pablito Mills Jr., D.O.   Stopped at 11/14/20 1938   • MD Alert...ICU Electrolyte Replacement per Pharmacy   Other PHARMACY TO DOSE Adalberto Galeana M.D.           Fluids    Intake/Output Summary (Last 24 hours) at  11/15/2020 0711  Last data filed at 11/15/2020 0600  Gross per 24 hour   Intake 2453.88 ml   Output 2420 ml   Net 33.88 ml       Laboratory  Recent Labs     11/13/20  1910 11/14/20  0349 11/15/20  0450   ISTATAPH 7.351* 7.156* 7.358*   ISTATAPCO2 40.0* 78.7* 57.9*   ISTATAPO2 47* 178* 126*   ISTATATCO2 23 30 34*   [id number] 81* 99 99   ISTATARTHCO3 22.1 27.8* 32.5*   ISTATARTBE -3 -3 5*   ISTATTEMP 98.0 F 37.9 C 37.2 C   ISTATFIO2 100 100 70   ISTATSPEC Arterial Arterial Arterial   ISTATAPHTC 7.356* 7.144* 7.355*   [id number] 46* 183* 127*         Recent Labs     11/13/20  0600 11/14/20  0040 11/15/20  0415   SODIUM 138 137 137   POTASSIUM 3.9 4.8 4.2   CHLORIDE 105 100 100   CO2 21 26 30   BUN 19 24* 22   CREATININE 0.67 0.83 0.67   MAGNESIUM  --  2.5 2.7*   PHOSPHORUS  --  6.6* 2.3*   CALCIUM 8.7 8.5 8.4*     Recent Labs     11/13/20  0600 11/14/20  0040 11/15/20  0415   ALTSGPT 65* 53* 52*   ASTSGOT 52* 52* 46*   ALKPHOSPHAT 74 86 70   TBILIRUBIN 0.7 0.9 0.7   GLUCOSE 115* 165* 171*     Recent Labs     11/13/20  0600 11/14/20  0040 11/14/20  0526 11/15/20  0415   WBC 13.4*  --  13.2* 11.9*   NEUTSPOLYS 91.80*  --  92.20* 92.50*   LYMPHOCYTES 5.80*  --  5.20* 3.30*   MONOCYTES 1.00  --  0.90 0.00   EOSINOPHILS 0.10  --  0.00 2.50   BASOPHILS 0.10  --  0.00 0.00   ASTSGOT 52* 52*  --  46*   ALTSGPT 65* 53*  --  52*   ALKPHOSPHAT 74 86  --  70   TBILIRUBIN 0.7 0.9  --  0.7     Recent Labs     11/13/20  0600 11/14/20  0526 11/15/20  0415   RBC 5.02 5.10 4.95   HEMOGLOBIN 15.0 15.4 15.0   HEMATOCRIT 44.5 48.1 46.1   PLATELETCT 271 296 256       Imaging  Reviewed    Assessment/Plan  Shock (HCC)  Assessment & Plan  Likely multifactorial:  Sedatives, COVID  Cont vasopressor therapies to maintain MAP goal > 65  Actively titrating levophed for MAP goal    Transaminitis- (present on admission)  Assessment & Plan  Likely 2/2 COVID-19  Avoid hepatotoxins  Monitor synthetic function.    Hypokalemia- (present on  admission)  Assessment & Plan  Replete to maintain > 4    Acute respiratory distress syndrome (ARDS) due to COVID-19 virus (HCC)  Assessment & Plan  Meets diagnostic requirements by [place] criteria  Bilateral, multifocal opacities.  Considered unlikely to be cardiac  Onset < 7 days  PaO2/FiO2 <300  Cont Lasix 20mg IV BID  Paralytics/proning protocol:  Improving P/F ratio and compliance, continue proning tonight and reassess tomorrow    COVID-19 virus infection- (present on admission)  Assessment & Plan  Detected on 11/6/2020  Worsening symptoms prompting admission on 11/12/2020  Decadron 6 mg p.o. daily for 10 days  Remdesivir 5-day course  Lung protective mechanical ventilation strategies  Paralytics and proning protocols  O2 sats > 85%  Cont  aggressive diuresis of lasix 20mg IV BID    Acute respiratory failure with hypoxia (HCC)- (present on admission)  Assessment & Plan  Worsened and required intubation on 11/13  Paralytics and proning protocols:  Noted improvement to P/F ratio and compliance-->will continue again tonight, reassess tomorrow  RT/O2 protocols  Low volume protective ventilation strategies  Continue aggressive diuresis  SBTs/SAT when off proning/paralytic protocol       VTE:  Lovenox  Ulcer: H2 Antagonist  Lines: Central Line  Ongoing indication addressed, Arterial Line  Ongoing indication addressed and Sawyer Catheter  Ongoing indication addressed    I have performed a physical exam and reviewed and updated ROS and Plan today (11/15/2020). In review of yesterday's note (11/14/2020), there are no changes except as documented above.     Discussed patient condition and risk of morbidity and/or mortality with RN, RT, Pharmacy, Charge nurse / hot rounds and Patient    The patient remains critically ill at this time with severe acute hypoxic respiratory failure due to ARDS from COVID-19 pneumonia requiring full ventilatory support as well as paralyzing agents and the proning protocol.  The patient remains  at high risk of clinical deterioration, worsening vital organ dysfunction, and death without the above critical care interventions.    Critical care time = 83 minutes in directly providing and coordinating critical care and extensive data review.  No time overlap and excludes procedures.

## 2020-11-15 NOTE — ASSESSMENT & PLAN NOTE
Continue to titrate phenylephrine drip to maintain MAP greater than 60   Continue high dose of oral midodrine  Monitor blood pressure closely

## 2020-11-16 NOTE — CARE PLAN
Problem: Nutritional:  Goal: Nutrition support tolerated and meeting greater than 85% of estimated needs  Outcome: PROGRESSING AS EXPECTED   Peptamen Intense VHP @ 45 mL/hr (rate with Propofol).

## 2020-11-16 NOTE — DOCUMENTATION QUERY
[facility]                                                                       Query Response Note      PATIENT:               PATSY GRADY  ACCT #:                  [id number]  MRN:                     [id number]  :                      1965  ADMIT DATE:       2020 12:17 AM  DISCH DATE:          RESPONDING  PROVIDER #:        [id number]           QUERY TEXT:    As documented in the medical record +SIRS criteria are met upon admission to hospital. Further documentation identifies Covid-19 infection present.  Please clarify whether patient has SIRS of non-infectious origin or Sepsis with source of infection being Covid-19, or whether Sepsis/SIRS is ruled out. In addition, pt with acute respiratory failure/ARDS.  Please confirm if acute respiratory failure/ARDS related to +SIRS or Sepsis, if applicable    NOTE:  If an appropriate response is not listed below, please respond with a new note.    The patient's Clinical Indicators include:  Clinical indicators-  Per Pulmonary consult: COVID+ PNA, acute hypoxic respiratory failure & ARDS. CXR consistent with dx with multifocal bilateral patchy opacities  Per  PN: Severe hypoxemia, intubated. Required high dose propofol for pre-paralytic sedation, caused hypotension, started levophed gtt  Per  PN: Shock, likely multifactorial: Sedatives, COVID     +SIRS criteria POA:   : Pulse , RR 30-55    : CRP 25.08, Procalcitonin 0.23, Lactic acid 1.7, WBC 10.1  BC 1/2+ Coagulase-negative Staphylococcus species, possible contaminant    Treatments-  Blood & urine culture  Repeat CBC, BMP & inflammatory markers  RT & O2 Protocol  ICU admission    Risks-  +SIRS criteria POA (HR >90, RR >20); +Covid-19 PNA; Acute respiratory failure; ARDS; CRP 25.08    Thank you,   GELY Schmidt RN  Connect via Voalte  Options provided:   -- Sepsis, source Covid-19, is ruled in, POA   --  Sepsis, source Covid-19, with acute organ dysfunction / acute respiratory failure (severe sepsis) is ruled in, POA   -- SIRS of non-infectious origin is ruled in, POA   -- SIRS of non-infectious origin with acute organ dysfunction / acute respiratory failure is ruled in, POA   -- Sepsis/SIRS is ruled out   -- Unable to determine      Query created by: Daisy Kim on 11/16/2020 11:50 AM    RESPONSE TEXT:    Sepsis, source Covid-19, with acute organ dysfunction / acute respiratory failure (severe sepsis) is ruled in, POA          Electronically signed by:  LANDON FOLEY MD 11/16/2020 11:52 AM

## 2020-11-16 NOTE — PROGRESS NOTES
Critical Care Progress Note    Date of admission  11/12/2020    Chief Complaint  55 y.o. male admitted 11/12/2020 with COVID pneumonia and acute hypoxic respiratory failure.    Hospital Course  Mr. Costa is a 55 year old male with no past medical history who tested positive for COVID on 11/6/2020 and has progressive worsening of shortness of breath and now admitted to the ICU for acute hypoxic respiratory failure due to COVID pneumonia.  11/13 - remains on max HFNC with NRBM, remdesivir/decadron, starting lasix, intubated and proned/paralyzed  11/14 - full vent support, 16/70%, diuresing well with lasix, P/F ratio 178, compliance 35, cont proning protocol  11/15 - full vent support, 16/60%, diuresing well, P/F ratio 180, compliance 38, cont proning protocol tonight   11/16 - full vent support, 16/50%, diuresing well, P/F ratio at 210 with compliance of 38, hold proning tonight, stop paralytics    Interval Problem Update  Reviewed last 24 hour events:   - proned last night and supine at 9am   - sedated/paralzyed   - no issues overnight   - Prop at 70   - Fent at 100   - Vec 0.08   - SR 90-100s   - SBP 90-130s   - levo at 6   - afebrile   - TFs at 25cc/hr   - left TLC IJ   - left radial a-line   - underwood with good  UOP overnight   - vent day #4   - no CXR today   - PEEP 16 and 50%    - lovenox, pepcid   - lasix 20mg BID, CO2 at 35   - K 3.7   - Phos 2.3    Yesterday's Events:   - proned last afternoon and now supine this morning   - sedated/paralyzed   - Prop at 70   - Fent at 100   - SR 80-90s   - SBP 80-90s   - generalized edema   - Tmax 37.2   - adequate UOP with underwood (2.4 liters in 24 hours)   - vec at 0.6   - levo at 6   - vent day #3   - CXR(reviewed): continue diffuse bilateral opacities   - PEEP at 16 and FiO2 60%   - lovenoex/pepcid   - day 4/10 of decadron   - day 4/5 of remdesivir      Review of Systems  Review of Systems   Unable to perform ROS: Intubated        Vital Signs for last 24 hours   Pulse:   [] 93  Resp:  [32] 32  BP: ()/(69-84) 119/84  SpO2:  [93 %-98 %] 97 %    Hemodynamic parameters for last 24 hours       Respiratory Information for the last 24 hours  Vent Mode: APVCMV  Rate (breaths/min): 35  Vt Target (mL): 410  PEEP/CPAP: 16  MAP: 20  Control VTE (exp VT): 417    Physical Exam   Physical Exam  Vitals signs and nursing note reviewed.   Constitutional:       Appearance: He is ill-appearing and diaphoretic. He is not toxic-appearing.      Comments: Sedated/paralyzed   HENT:      Head: Normocephalic and atraumatic.      Right Ear: External ear normal.      Left Ear: External ear normal.      Nose: Nose normal. No rhinorrhea.      Mouth/Throat:      Mouth: Mucous membranes are moist.      Comments: ETT in place  Eyes:      General: No scleral icterus.     Conjunctiva/sclera: Conjunctivae normal.      Comments: Periorbital edema   Neck:      Musculoskeletal: Normal range of motion and neck supple.      Comments: Right IJ TLC  Cardiovascular:      Rate and Rhythm: Normal rate and regular rhythm.      Pulses: Normal pulses.      Heart sounds: Normal heart sounds. No murmur.   Pulmonary:      Breath sounds: No wheezing.      Comments: Breathing comfortably on the vent, clear but diminished  Chest:      Chest wall: No tenderness.   Abdominal:      Palpations: Abdomen is soft.      Tenderness: There is no abdominal tenderness. There is no guarding or rebound.      Comments: Hypoactive bowel sounds   Musculoskeletal: Normal range of motion.         General: No tenderness.      Right lower leg: No edema.      Left lower leg: No edema.   Lymphadenopathy:      Cervical: No cervical adenopathy.   Skin:     General: Skin is warm.      Capillary Refill: Capillary refill takes less than 2 seconds.      Findings: No rash.   Neurological:      Comments: Sedated and paralyzed   Psychiatric:      Comments: Unable to assess     no change to exam    Medications  Current Facility-Administered Medications    Medication Dose Route Frequency Provider Last Rate Last Admin   • potassium phosphate 15 mmol in  mL ivpb  15 mmol Intravenous Once Patti Park M.D.       • fentaNYL (SUBLIMAZE) 50 mcg/mL in 50mL (Continuous Infusion)   Intravenous Continuous Patti Park M.D. 2 mL/hr at 11/14/20 1123 100 mcg at 11/15/20 1351   • propofol (DIPRIVAN) injection  0-80 mcg/kg/min Intravenous Continuous Patti Park M.D. 43.1 mL/hr at 11/16/20 0614 70 mcg/kg/min at 11/16/20 0614   • furosemide (LASIX) injection 20 mg  20 mg Intravenous BID DIURETIC Patti Park M.D.   20 mg at 11/16/20 0424   • Respiratory Therapy Consult   Nebulization Continuous RT Mahsa Reis M.D.       • ipratropium-albuterol (DUONEB) nebulizer solution  3 mL Nebulization Q2HRS PRN (RT) Mahsa Reis M.D.       • famotidine (PEPCID) tablet 20 mg  20 mg Enteral Tube Q12HRS Mahsa Reis M.D.        Or   • famotidine (PEPCID) injection 20 mg  20 mg Intravenous Q12HRS Mahsa Reis M.D.   20 mg at 11/16/20 0423   • Pharmacy Consult: Enteral tube insertion - review meds/change route/product selection  1 Each Other PHARMACY TO DOSE Mahsa Reis M.D.       • fentaNYL (SUBLIMAZE) injection 50 mcg  50 mcg Intravenous Q15 MIN PRN Mahsa Reis M.D.        And   • fentaNYL (SUBLIMAZE) injection 100 mcg  100 mcg Intravenous Q15 MIN PRN Mahsa Reis M.D.   100 mcg at 11/13/20 2008   • insulin regular (HumuLIN R,NovoLIN R) injection  1-6 Units Subcutaneous Q6HRS Mahsa Reis M.D.   1 Units at 11/16/20 0534    And   • dextrose 50% (D50W) injection 50 mL  50 mL Intravenous Q15 MIN PRN Mahsa Reis M.D.       • senna-docusate (PERICOLACE or SENOKOT S) 8.6-50 MG per tablet 2 Tab  2 Tab Enteral Tube BID Mahsa Reis M.D.   2 Tab at 11/16/20 0424    And   • polyethylene glycol/lytes (MIRALAX) PACKET 1 Packet  1 Packet Enteral Tube QDAY PRN Mahsa Reis M.D.        And   • magnesium hydroxide (MILK OF MAGNESIA) suspension 30 mL   30 mL Enteral Tube QDAY PRN Mahsa Reis M.D.        And   • bisacodyl (DULCOLAX) suppository 10 mg  10 mg Rectal QDAY PRN Mahsa Reis M.D.       • acetaminophen (Tylenol) tablet 650 mg  650 mg Enteral Tube Q6HRS PRN Mahsa Reis M.D.       • ondansetron (ZOFRAN ODT) dispertab 4 mg  4 mg Enteral Tube Q6HRS PRN Mahsa Reis M.D.       • artificial tears (EYE LUBRICANT) ophth ointment 1 Application  1 Application Both Eyes Q8HRS Mahsa Reis M.D.   1 Application at 11/16/20 0457   • vecuronium (NORCURON) 60 mg in 5% dextrose 500 mL Infusion  0-1.7 mcg/kg/min Intravenous Continuous Mahsa Reis M.D. 41 mL/hr at 11/16/20 0358 0.8 mcg/kg/min at 11/16/20 0358   • vecuronium (NORCURON) injection 10 mg  10 mg Intravenous Q2HRS PRN Mahsa Reis M.D.   10 mg at 11/15/20 2112   • norepinephrine (Levophed) infusion 8 mg/250 mL (premix)  0-30 mcg/min Intravenous Continuous Patti Park M.D. 11.3 mL/hr at 11/15/20 1341 6 mcg/min at 11/15/20 1341   • promethazine (PHENERGAN) tablet 12.5-25 mg  12.5-25 mg Oral Q4HRS PRN Pablito Mills Jr., D.O.       • promethazine (PHENERGAN) suppository 12.5-25 mg  12.5-25 mg Rectal Q4HRS PRN Pablito Mills Jr., D.O.       • prochlorperazine (COMPAZINE) injection 5-10 mg  5-10 mg Intravenous Q4HRS PRN Pablito Mills Jr., D.O.       • ondansetron (ZOFRAN) syringe/vial injection 4 mg  4 mg Intravenous Q6HRS PRN Pablito Mills Jr. D.O.       • enoxaparin (LOVENOX) inj 40 mg  40 mg Subcutaneous DAILY EMANUEL Parker Jr..O.   40 mg at 11/16/20 0423   • dexamethasone (DECADRON) injection 6 mg  6 mg Intravenous DAILY Pablito Mills Jr., D.O.   6 mg at 11/16/20 0424   • MD Alert...ICU Electrolyte Replacement per Pharmacy   Other PHARMACY TO DOSE Adalberto Galeana M.D.           Fluids    Intake/Output Summary (Last 24 hours) at 11/16/2020 0716  Last data filed at 11/16/2020 0600  Gross per 24 hour   Intake 2271 ml   Output 1800 ml   Net 471 ml       Laboratory  Recent  Labs     11/14/20  0349 11/15/20  0450 11/16/20  0410   ISTATAPH 7.156* 7.358* 7.398*   ISTATAPCO2 78.7* 57.9* 57.4*   ISTATAPO2 178* 126* 105*   ISTATATCO2 30 34* 37*   [id number] 99 99 98   ISTATARTHCO3 27.8* 32.5* 35.4*   ISTATARTBE -3 5* 9*   ISTATTEMP 37.9 C 37.2 C 37.2 C   ISTATFIO2 100 70 50   ISTATSPEC Arterial Arterial Arterial   ISTATAPHTC 7.144* 7.355* 7.396*   [id number] 183* 127* 106*         Recent Labs     11/14/20  0040 11/15/20  0415 11/16/20  0415   SODIUM 137 137 138   POTASSIUM 4.8 4.2 3.7   CHLORIDE 100 100 97   CO2 26 30 35*   BUN 24* 22 21   CREATININE 0.83 0.67 0.57   MAGNESIUM 2.5 2.7* 2.5   PHOSPHORUS 6.6* 2.3* 2.3*   CALCIUM 8.5 8.4* 8.2*     Recent Labs     11/14/20  0040 11/15/20  0415 11/16/20  0415   ALTSGPT 53* 52* 46   ASTSGOT 52* 46* 36   ALKPHOSPHAT 86 70 64   TBILIRUBIN 0.9 0.7 0.7   GLUCOSE 165* 171* 175*     Recent Labs     11/14/20  0040 11/14/20  0526 11/15/20  0415 11/16/20  0415   WBC  --  13.2* 11.9* 9.5   NEUTSPOLYS  --  92.20* 92.50* 82.00*   LYMPHOCYTES  --  5.20* 3.30* 12.00*   MONOCYTES  --  0.90 0.00 3.00   EOSINOPHILS  --  0.00 2.50 3.00   BASOPHILS  --  0.00 0.00 0.00   ASTSGOT 52*  --  46* 36   ALTSGPT 53*  --  52* 46   ALKPHOSPHAT 86  --  70 64   TBILIRUBIN 0.9  --  0.7 0.7     Recent Labs     11/14/20  0526 11/15/20  0415 11/16/20  0415   RBC 5.10 4.95 4.92   HEMOGLOBIN 15.4 15.0 14.8   HEMATOCRIT 48.1 46.1 45.9   PLATELETCT 296 256 242       Imaging  Reviewed    Assessment/Plan  Shock (HCC)  Assessment & Plan  Likely multifactorial:  Sedatives, COVID  Cont vasopressor therapies to maintain MAP goal > 65  Actively titrating levophed for MAP goal    Transaminitis- (present on admission)  Assessment & Plan  Likely 2/2 COVID-19  Avoid hepatotoxins  Monitor synthetic function.    Hypokalemia- (present on admission)  Assessment & Plan  Replete to maintain > 4    Acute respiratory distress syndrome (ARDS) due to COVID-19 virus (HCC)  Assessment & Plan  Meets  diagnostic requirements by [place] criteria  Bilateral, multifocal opacities.  Considered unlikely to be cardiac  Onset < 7 days  PaO2/FiO2 <300  Cont Lasix 20mg IV BID  Paralytics/proning protocol:  Improving P/F ratio and compliance-->hold proning tonight, stop paralytics    COVID-19 virus infection- (present on admission)  Assessment & Plan  Detected on 11/6/2020  Worsening symptoms prompting admission on 11/12/2020  Decadron 6 mg p.o. daily for 10 days (finishes on 11/21)  Complete 5-day Remdesivir 5-day   Lung protective mechanical ventilation strategies  Paralytics and proning protocols  O2 sats > 85%  Cont diuresis of lasix 20mg IV daily    Acute respiratory failure with hypoxia (HCC)- (present on admission)  Assessment & Plan  Worsened and required intubation on 11/13  Paralytics and proning protocols:  Noted improvement to P/F ratio and compliance-->improved P/F: hold proning tonight, stop paralytics  RT/O2 protocols  Low volume protective ventilation strategies  Continue aggressive diuresis  SBTs/SAT when off proning/paralytic protocol       VTE:  Lovenox  Ulcer: H2 Antagonist  Lines: Central Line  Ongoing indication addressed, Arterial Line  Ongoing indication addressed and Sawyer Catheter  Ongoing indication addressed    I have performed a physical exam and reviewed and updated ROS and Plan today (11/16/2020). In review of yesterday's note (11/15/2020), there are no changes except as documented above.     Discussed patient condition and risk of morbidity and/or mortality with RN, RT, Pharmacy, Charge nurse / hot rounds and Patient     Patient remains critically ill at this time with severe acute hypoxic respiratory failure due to ARDS from COVID-19 pneumonia requiring full ventilatory support.  The patient's P/F ratio is improving and will hold the proning protocol tonight as well to stop paralytics.  The patient has high risk of clinical deterioration, worsening vital organ dysfunction, and death without  the above critical care interventions.    Critical care time = 52 minutes in directly providing and coordinating critical care and extensive data review.  No time overlap and excludes procedures.

## 2020-11-17 NOTE — DISCHARGE PLANNING
Medical Social Work    LSW attempted to complete assessment on pt but phone number listed on facesheet ([phone]) no one answers and LSW unable to leave a message. Phone number listed for pt's wife [phone] was a wrong number belonging to someone else. LSW removed this number from the chart. LSW attempted to call pt's brother, Rashel ([phone]), and Rashel unable to answer assessment questions. Rashel requested that LSW call him back in 15 minutes and he will get the pt's girlfriend's phone number.

## 2020-11-17 NOTE — DISCHARGE PLANNING
LSW received the correct phone number from pt's brother to get in contact w/ pt's significant other Traci. LSW completed assessment w/ information given by pt's significant other, Traci Valencia ([phone]). Traci confirmed that the information on the Facesheet is accurate and provided pt's SS# . Traci reports that she lives with the pt in a two story [facility] and prior to admission pt was independent in all daily living tasks. Traci states that the pt was always cooking and did all the laundry as he was just that kind of philomena. Traci reports that the pt was seen by different doctors at [facility] and his preferred pharmacy is [facility] on [address] [phone]. No MH or substance use hx. No O2 or DME used. At this time there are no indicated discharge risks or barriers.     Pt's significant other, Traci, can be reached at [phone].     Care Transition Team Assessment    Information Source  Orientation : Unable to Assess  Information Given By: Significant Other  Informant's Name: Traci   Who is responsible for making decisions for patient? : Patient    Readmission Evaluation  Is this a readmission?: No    Elopement Risk  Legal Hold: No  Ambulatory or Self Mobile in Wheelchair: No-Not an Elopement Risk  Disoriented: Situation-At Risk for Elopement  Psychiatric Symptoms: None  History of Wandering: No  Elopement this Admit: No  Vocalizing Wanting to Leave: No  Displays Behaviors, Body Language Wanting to Leave: No-Not at Risk for Elopement  Elopement Risk: Not at Risk for Elopement  Personal Belongings: Hospital Clothing Only    Interdisciplinary Discharge Planning  Patient or legal guardian wants to designate a caregiver: No    Discharge Preparedness  What is your plan after discharge?: Uncertain - pending medical team collaboration  What are your discharge supports?: Sibling, Other (comment)(Pt's son and significant other.)  Prior Functional Level: Ambulatory, Drives Self, Independent with  Activities of Daily Living, Independent with Medication Management  Difficulity with ADLs: None  Difficulity with IADLs: None    Functional Assesment  Prior Functional Level: Ambulatory, Drives Self, Independent with Activities of Daily Living, Independent with Medication Management    Finances  Financial Barriers to Discharge: No  Prescription Coverage: Yes(Pt's preferred pharmacy [facility] ([phone]))    Advance Directive  Advance Directive?: None    Domestic Abuse  Have you ever been the victim of abuse or violence?: Refused  Physical Abuse or Sexual Abuse: No  Verbal Abuse or Emotional Abuse: No  Possible Abuse/Neglect Reported to:: Not Applicable    Psychological Assessment  History of Substance Abuse: None  History of Psychiatric Problems: No    Discharge Risks or Barriers  Discharge risks or barriers?: No

## 2020-11-17 NOTE — PROGRESS NOTES
Patient not syncing with ventilator, and requiring to increase FiO2 to 100% (from 80%).  Patient rass -4, MD made aware.   Orders entered to restart Vecuronium drip.  Pharmacy notified to send gtt once ready, as patient rass is at target.    gtts verified.  Unable to decrease sedation to allow for neuro exam due to the scenario described above.    Patient observed moving extremities.  MD requesting patient to be proned at 1400 after paralytic infusing.    Restraints currently in place, assessment completed.  No sign of injury. To be removed after initiation of paralytic

## 2020-11-17 NOTE — CARE PLAN
Problem: Ventilation Defect:  Goal: Ability to achieve and maintain unassisted ventilation or tolerate decreased levels of ventilator support  Outcome: PROGRESSING SLOWER THAN EXPECTED      Ventilator Daily Summary    Vent Day #5    Ventilator settings changed this shift: Increased FIO2.     APV 32/410/+14 80%    Weaning trials: No    Respiratory Procedures: No    Plan: Continue current ventilator settings and wean mechanical ventilation as tolerated per physician orders.

## 2020-11-17 NOTE — PROGRESS NOTES
Critical Care Progress Note    Date of admission  11/12/2020    Chief Complaint  55 y.o. male admitted 11/12/2020 with COVID pneumonia and acute hypoxic respiratory failure.    Hospital Course  Mr. Costa is a 55 year old male with no past medical history who tested positive for COVID on 11/6/2020 and has progressive worsening of shortness of breath and now admitted to the ICU for acute hypoxic respiratory failure due to COVID pneumonia.  11/13 - remains on max HFNC with NRBM, remdesivir/decadron, starting lasix, intubated and proned/paralyzed  11/14 - full vent support, 16/70%, diuresing well with lasix, P/F ratio 178, compliance 35, cont proning protocol  11/15 - full vent support, 16/60%, diuresing well, P/F ratio 180, compliance 38, cont proning protocol tonight   11/16 - full vent support, 16/50%, diuresing well, P/F ratio at 210 with compliance of 38, hold proning tonight, stop paralytics  11/17 - full vent support, did not tolerate being off paralytics and holding proning-->p/f ratio 86, compliance of 39, PEEP 14 with FiO2 of 100%    Interval Problem Update  Reviewed last 24 hour events:   - did not prone last night, held paralytics-->dyssynchronous on the vent   - cough/corneal, moving all, not following commands   - paralytics restarted   - SR/ST 90-100s   - SBP 90-110s   - Tmax 38   - TFs at 15cc/hr   - UOP adequate   - fent at 200   - prop at 8   - levo at 5   - vec restarted   - vent day #5   - CXR(reviewed): slihgt improvment to bibasilar opacities   - PEEP 14, FiO2 100%   - lovenox/pepcid   - day #6/10 of decadron   - lasix 20mg IV    Yesterday's Events:   - proned last night and supine at 9am   - sedated/paralzyed   - no issues overnight   - Prop at 70   - Fent at 100   - Vec 0.08   - SR 90-100s   - SBP 90-130s   - levo at 6   - afebrile   - TFs at 25cc/hr   - left TLC IJ   - left radial a-line   - underwood with good  UOP overnight   - vent day #4   - no CXR today   - PEEP 16 and 50%    - lovenox,  pepcid   - lasix 20mg BID, CO2 at 35   - K 3.7   - Phos 2.3      Review of Systems  Review of Systems   Unable to perform ROS: Intubated        Vital Signs for last 24 hours   Pulse:  [] 90  Resp:  [14-39] 32  BP: ()/(61-80) 101/66  SpO2:  [84 %-97 %] 93 %    Hemodynamic parameters for last 24 hours       Respiratory Information for the last 24 hours  Vent Mode: APVCMV  Rate (breaths/min): 32  Vt Target (mL): 410  PEEP/CPAP: 14  MAP: 19  Control VTE (exp VT): 395    Physical Exam   Physical Exam  Vitals signs and nursing note reviewed.   Constitutional:       Appearance: He is ill-appearing and diaphoretic. He is not toxic-appearing.      Comments: Sedated/paralyzed again   HENT:      Head: Normocephalic and atraumatic.      Right Ear: External ear normal.      Left Ear: External ear normal.      Nose: Nose normal. No rhinorrhea.      Mouth/Throat:      Mouth: Mucous membranes are moist.      Comments: ETT in place  Eyes:      General: No scleral icterus.     Conjunctiva/sclera: Conjunctivae normal.      Pupils: Pupils are equal, round, and reactive to light.   Neck:      Musculoskeletal: Normal range of motion and neck supple.      Comments: Right IJ TLC  Cardiovascular:      Rate and Rhythm: Normal rate and regular rhythm.      Pulses: Normal pulses.      Heart sounds: Normal heart sounds. No murmur.   Pulmonary:      Breath sounds: No wheezing.      Comments: Breathing comfortably on the vent after paralysis, clear anteriorly, diminished bases  Chest:      Chest wall: No tenderness.   Abdominal:      Palpations: Abdomen is soft.      Tenderness: There is no abdominal tenderness. There is no guarding or rebound.      Comments: Hypoactive bowel sounds   Musculoskeletal: Normal range of motion.         General: No tenderness.      Right lower leg: No edema.      Left lower leg: No edema.   Lymphadenopathy:      Cervical: No cervical adenopathy.   Skin:     General: Skin is warm.      Capillary Refill:  Capillary refill takes less than 2 seconds.      Findings: No rash.   Neurological:      Comments: Sedated and paralyzed   Psychiatric:      Comments: Unable to assess         Medications  Current Facility-Administered Medications   Medication Dose Route Frequency Provider Last Rate Last Admin   • FUROSEMIDE 10 MG/ML INJ SOLN            • furosemide (LASIX) injection 20 mg  20 mg Intravenous Q DAY Patti Park M.D.   20 mg at 11/17/20 0443   • promethazine (PHENERGAN) tablet 12.5-25 mg  12.5-25 mg Enteral Tube Q4HRS PRN Patti Park M.D.       • fentaNYL (SUBLIMAZE) injection 100 mcg  100 mcg Intravenous Q15 MIN PRN Patti Park M.D.        And   • fentaNYL (SUBLIMAZE) injection 200 mcg  200 mcg Intravenous Q15 MIN PRN Patti Park M.D.        And   • fentaNYL (SUBLIMAZE) 50 mcg/mL in 50mL (Continuous Infusion)   Intravenous Continuous Patti Park M.D. 6 mL/hr at 11/17/20 0000 300 mcg at 11/17/20 0221    And   • propofol (DIPRIVAN) injection  0-80 mcg/kg/min Intravenous Continuous Patti Park M.D. 49.2 mL/hr at 11/17/20 0612 80 mcg/kg/min at 11/17/20 0612   • Respiratory Therapy Consult   Nebulization Continuous RT Mahsa Reis M.D.       • ipratropium-albuterol (DUONEB) nebulizer solution  3 mL Nebulization Q2HRS PRN (RT) Mahsa Reis M.D.       • famotidine (PEPCID) tablet 20 mg  20 mg Enteral Tube Q12HRS Mahsa Reis M.D.   20 mg at 11/16/20 1733    Or   • famotidine (PEPCID) injection 20 mg  20 mg Intravenous Q12HRS Mahsa Reis M.D.   20 mg at 11/17/20 0431   • Pharmacy Consult: Enteral tube insertion - review meds/change route/product selection  1 Each Other PHARMACY TO DOSE Mahsa Reis M.D.       • insulin regular (HumuLIN R,NovoLIN R) injection  1-6 Units Subcutaneous Q6HRS Mahsa Reis M.D.   Stopped at 11/17/20 0600    And   • dextrose 50% (D50W) injection 50 mL  50 mL Intravenous Q15 MIN PRN Mahsa Reis M.D.       • senna-docusate (PERICOLACE or  SENOKOT S) 8.6-50 MG per tablet 2 Tab  2 Tab Enteral Tube BID Mahsa Reis M.D.   2 Tab at 11/17/20 0432    And   • polyethylene glycol/lytes (MIRALAX) PACKET 1 Packet  1 Packet Enteral Tube QDAY PRN Mahsa Reis M.D.        And   • magnesium hydroxide (MILK OF MAGNESIA) suspension 30 mL  30 mL Enteral Tube QDAY PRN Mahsa Reis M.D.        And   • bisacodyl (DULCOLAX) suppository 10 mg  10 mg Rectal QDAY PRN Mahsa Reis M.D.       • acetaminophen (Tylenol) tablet 650 mg  650 mg Enteral Tube Q6HRS PRN Mahsa Reis M.D.       • ondansetron (ZOFRAN ODT) dispertab 4 mg  4 mg Enteral Tube Q6HRS PRN Mahsa Reis M.D.       • artificial tears (EYE LUBRICANT) ophth ointment 1 Application  1 Application Both Eyes Q8HRS Mahsa Reis M.D.   Stopped at 11/16/20 2200   • vecuronium (NORCURON) 60 mg in 5% dextrose 500 mL Infusion  0-1.7 mcg/kg/min Intravenous Continuous Mahsa Reis M.D.   Stopped at 11/16/20 1100   • vecuronium (NORCURON) injection 10 mg  10 mg Intravenous Q2HRS PRN Mahsa Reis M.D.   10 mg at 11/17/20 0039   • norepinephrine (Levophed) infusion 8 mg/250 mL (premix)  0-30 mcg/min Intravenous Continuous Patti Park M.D. 11.3 mL/hr at 11/16/20 1405 6 mcg/min at 11/16/20 1405   • promethazine (PHENERGAN) suppository 12.5-25 mg  12.5-25 mg Rectal Q4HRS PRN Pablito Mills Jr. D.O.       • prochlorperazine (COMPAZINE) injection 5-10 mg  5-10 mg Intravenous Q4HRS PRN Pablito Mlils Jr. D.O.       • ondansetron (ZOFRAN) syringe/vial injection 4 mg  4 mg Intravenous Q6HRS PRN Pablito Mills Jr., D.O.       • enoxaparin (LOVENOX) inj 40 mg  40 mg Subcutaneous DAILY Pablito Mills Jr., D.O.   40 mg at 11/17/20 0431   • dexamethasone (DECADRON) injection 6 mg  6 mg Intravenous DAILY Pablito Mills Jr., D.O.   6 mg at 11/17/20 0431   • MD Alert...ICU Electrolyte Replacement per Pharmacy   Other PHARMACY TO DOSE Adalberto Galeana M.D.           Fluids    Intake/Output Summary (Last  24 hours) at 11/17/2020 0705  Last data filed at 11/17/2020 0600  Gross per 24 hour   Intake 1405.17 ml   Output 1780 ml   Net -374.83 ml       Laboratory  Recent Labs     11/15/20  0450 11/16/20  0410 11/17/20  0416   ISTATAPH 7.358* 7.398* 7.478   ISTATAPCO2 57.9* 57.4* 51.6*   ISTATAPO2 126* 105* 69   ISTATATCO2 34* 37* 40*   YGOIGUU6MSZ 99 98 94   ISTATARTHCO3 32.5* 35.4* 38.3*   ISTATARTBE 5* 9* 13*   ISTATTEMP 37.2 C 37.2 C 37.5 C   ISTATFIO2 70 50 80   ISTATSPEC Arterial Arterial Arterial   ISTATAPHTC 7.355* 7.396* 7.471   [id number] 127* 106* 71         Recent Labs     11/15/20  0415 11/16/20  0415 11/17/20  0430   SODIUM 137 138 138   POTASSIUM 4.2 3.7 3.7   CHLORIDE 100 97 96   CO2 30 35* 36*   BUN 22 21 24*   CREATININE 0.67 0.57 0.52   MAGNESIUM 2.7* 2.5 2.4   PHOSPHORUS 2.3* 2.3* 1.7*   CALCIUM 8.4* 8.2* 7.9*     Recent Labs     11/15/20  0415 11/16/20  0415 11/16/20  1800 11/17/20 0430   ALTSGPT 52* 46  --  40   ASTSGOT 46* 36  --  44   ALKPHOSPHAT 70 64  --  60   TBILIRUBIN 0.7 0.7  --  0.6   PREALBUMIN  --   --  9.7*  --    GLUCOSE 171* 175*  --  148*     Recent Labs     11/15/20  0415 11/16/20  0415 11/17/20  0430   WBC 11.9* 9.5 10.1   NEUTSPOLYS 92.50* 82.00*  --    LYMPHOCYTES 3.30* 12.00*  --    MONOCYTES 0.00 3.00  --    EOSINOPHILS 2.50 3.00  --    BASOPHILS 0.00 0.00  --    ASTSGOT 46* 36 44   ALTSGPT 52* 46 40   ALKPHOSPHAT 70 64 60   TBILIRUBIN 0.7 0.7 0.6     Recent Labs     11/15/20  0415 11/16/20  0415 11/17/20  0430   RBC 4.95 4.92 4.58*   HEMOGLOBIN 15.0 14.8 14.0   HEMATOCRIT 46.1 45.9 42.6   PLATELETCT 256 242 268       Imaging  Reviewed    Assessment/Plan  Shock (HCC)  Assessment & Plan  Likely multifactorial:  Sedatives, COVID  Cont vasopressor therapies to maintain MAP goal > 65  Actively titrating levophed for MAP goal.    Transaminitis- (present on admission)  Assessment & Plan  Likely 2/2 COVID-19  Avoid hepatotoxins  Monitor synthetic function.    Hypokalemia- (present on  admission)  Assessment & Plan  Replete to maintain > 4    Acute respiratory distress syndrome (ARDS) due to COVID-19 virus (HCC)  Assessment & Plan  Meets diagnostic requirements by [place] criteria  Bilateral, multifocal opacities.  Considered unlikely to be cardiac  Onset < 7 days  PaO2/FiO2 <300  Cont Lasix 20mg IV BID  Paralytics/proning protocol-->worsened P/F ratio: will restart paralytics and proning protocol    COVID-19 virus infection- (present on admission)  Assessment & Plan  Detected on 11/6/2020  Worsening symptoms prompting admission on 11/12/2020  Decadron 6 mg p.o. daily for 10 days (finishes on 11/21)  Completed 5-day Remdesivir 5-day   Lung protective mechanical ventilation strategies  Paralytics and proning protocols  O2 sats > 85%  Cont diuresis of lasix 20mg IV BID    Acute respiratory failure with hypoxia (HCC)- (present on admission)  Assessment & Plan  Worsened and required intubation on 11/13  Paralytics and proning protocols:  Severe worsening of P/F ratio today-->reparalyzed and restarted proning protocol  RT/O2 protocols  Low volume protective ventilation strategies  Continue aggressive diuresis  SBTs/SAT when off proning/paralytic protocol       VTE:  Lovenox  Ulcer: H2 Antagonist  Lines: Central Line  Ongoing indication addressed, Arterial Line  Ongoing indication addressed and Sawyer Catheter  Ongoing indication addressed    I have performed a physical exam and reviewed and updated ROS and Plan today (11/17/2020). In review of yesterday's note (11/16/2020), there are no changes except as documented above.     Discussed patient condition and risk of morbidity and/or mortality with RN, RT, Pharmacy, Charge nurse / hot rounds and Patient     The patient remains critically ill at this time with severe acute hypoxic respiratory failure due to ARDS from COVID-19 pneumonia requiring full ventilatory support and will now require proning protocol along with paralytics again.  Patient remains at  high risk of clinical deterioration, worsening vital organ dysfunction, and death without the above critical care interventions.    Critical care time = 62 minutes in directly providing and coordinating critical care and extensive data review.  No time overlap and excludes procedures.

## 2020-11-17 NOTE — PROGRESS NOTES
Desaturated to high 70's with current vent settings without stimulation. In room at this time, O2 breaths administered without significant effect on spo2%. Now maintaining low 80's. Inline suction with scant thin clear secretions. Breathing pattern is irregular and receiving inconsistent volumes. Dr Farris notified of pt status, and orders received to administer prn 10mg vecuronium push to assist with ventilator compliance. (See mar for administration). Waldo R/T in room to increase Fio2% to 80.    0100: Previous interventions effective in increasing spo2%, now maintaining low 90's. Monitoring.

## 2020-11-18 NOTE — PROGRESS NOTES
0840- Rt and 4RN to bedside to supine patient. Pt successfully supined, VSS. Pt began moving and coughing at this time, MD Park updated that previously done TOF was 1 twitch. Per MD give Vecuronium bolus and increase gtt per order.

## 2020-11-18 NOTE — DISCHARGE PLANNING
Anticipated Discharge Disposition: TBD    Action: received short term disability paperwork for pt from step daughter Debra [phone]. Completed paperwork and faxed to  Loni Landaverde [phone]. Fax confirmation received. Spoke w/ Debra. Debra states she does not wish to be added to emergency contact list at this time. Scanned copy of paperwork into epic media. Updated Debra.    Barriers to Discharge: TBD    Plan: TBD

## 2020-11-18 NOTE — PROGRESS NOTES
During turn/repositioning patient noted to open and blink eyes and was moving extremities. MD Park updated, per MD give 10mg Versed now and increse Vecuronium gtt per order. All verbal orders repeated to MD and placed in EPIC

## 2020-11-18 NOTE — PROGRESS NOTES
Critical Care Progress Note    Date of admission  11/12/2020    Chief Complaint  55 y.o. male admitted 11/12/2020 with COVID pneumonia and acute hypoxic respiratory failure.    Hospital Course  Mr. Costa is a 55 year old male with no past medical history who tested positive for COVID on 11/6/2020 and has progressive worsening of shortness of breath and now admitted to the ICU for acute hypoxic respiratory failure due to COVID pneumonia.  11/13 - remains on max HFNC with NRBM, remdesivir/decadron, starting lasix, intubated and proned/paralyzed  11/14 - full vent support, 16/70%, diuresing well with lasix, P/F ratio 178, compliance 35, cont proning protocol  11/15 - full vent support, 16/60%, diuresing well, P/F ratio 180, compliance 38, cont proning protocol tonight   11/16 - full vent support, 16/50%, diuresing well, P/F ratio at 210 with compliance of 38, hold proning tonight, stop paralytics  11/17 - full vent support, did not tolerate being off paralytics and holding proning-->p/f ratio 86, compliance of 39, PEEP 14 with FiO2 of 100%  11/18 - full vent support, 14/70%, paralyzed/proned, p/f 111, compliance 44, stopped propofol and started versed    Interval Problem Update  Reviewed last 24 hour events:   - proned/paralyzed last night   - Vec at 0.4   - SR 70-80s   - SBP 90s   - Levo at 8   - Prop at 80   - Fent at 200   - TFs at 15cc/hr   - no BM for a week   - UOP with 450cc overnight   - vent day #6   - CXR(reviewed): bilateral basilar opacities, no change   - PEEP 14/70%   - lovenox/pepcid   - lasix 20mg    - K 3.6   - decadron   - TGs 500c    Yesterday's Events:   - did not prone last night, held paralytics-->dyssynchronous on the vent   - cough/corneal, moving all, not following commands   - paralytics restarted   - SR/ST 90-100s   - SBP 90-110s   - Tmax 38   - TFs at 15cc/hr   - UOP adequate   - fent at 200   - prop at 80   - levo at 5   - vec restarted   - vent day #5   - CXR(reviewed): rik improvment  to bibasilar opacities   - PEEP 14, FiO2 100%   - lovenox/pepcid   - day #6/10 of decadron   - lasix 20mg IV      Review of Systems  Review of Systems   Unable to perform ROS: Intubated        Vital Signs for last 24 hours   Pulse:  [79-95] 84  Resp:  [18-32] 32  BP: ()/(53-75) 84/58  SpO2:  [82 %-97 %] 96 %    Hemodynamic parameters for last 24 hours       Respiratory Information for the last 24 hours  Vent Mode: APVCMV  Rate (breaths/min): 32  Vt Target (mL): 4110  PEEP/CPAP: 14  MAP: 22  Control VTE (exp VT): 410    Physical Exam   Physical Exam  Vitals signs and nursing note reviewed.   Constitutional:       Appearance: He is ill-appearing and diaphoretic. He is not toxic-appearing.      Comments: Sedated/paralyzed again   HENT:      Head: Normocephalic and atraumatic.      Right Ear: External ear normal.      Left Ear: External ear normal.      Nose: Nose normal. No rhinorrhea.      Mouth/Throat:      Mouth: Mucous membranes are moist.      Comments: ETT in place  Eyes:      General: No scleral icterus.     Conjunctiva/sclera: Conjunctivae normal.      Pupils: Pupils are equal, round, and reactive to light.   Neck:      Musculoskeletal: Normal range of motion and neck supple.      Comments: Right IJ TLC  Cardiovascular:      Rate and Rhythm: Normal rate and regular rhythm.      Pulses: Normal pulses.      Heart sounds: Normal heart sounds. No murmur.   Pulmonary:      Breath sounds: No wheezing.      Comments: Breathing comfortably on the vent after paralysis, clear anteriorly, diminished bases  Chest:      Chest wall: No tenderness.   Abdominal:      Palpations: Abdomen is soft.      Tenderness: There is no abdominal tenderness. There is no guarding or rebound.      Comments: Hypoactive bowel sounds   Musculoskeletal: Normal range of motion.         General: No tenderness.      Right lower leg: No edema.      Left lower leg: No edema.   Lymphadenopathy:      Cervical: No cervical adenopathy.   Skin:      General: Skin is warm.      Capillary Refill: Capillary refill takes less than 2 seconds.      Findings: No rash.   Neurological:      Comments: Sedated and paralyzed   Psychiatric:      Comments: Unable to assess     no change to exam    Medications  Current Facility-Administered Medications   Medication Dose Route Frequency Provider Last Rate Last Admin   • artificial tears (EYE LUBRICANT) ophth ointment 1 Application  1 Application Both Eyes Q8HRS Patti Park M.D.   1 Application at 11/18/20 0501   • vecuronium (NORCURON) 60 mg in 5% dextrose 500 mL Infusion  0-1.7 mcg/kg/min Intravenous Continuous Patti Park M.D. 10.3 mL/hr at 11/17/20 1900 0.2 mcg/kg/min at 11/17/20 1900   • vecuronium (NORCURON) injection 10 mg  10 mg Intravenous Q2HRS PRN Patti Park M.D.       • propofol (DIPRIVAN) injection  0-80 mcg/kg/min Intravenous Continuous Patti Park M.D. 49.2 mL/hr at 11/18/20 0459 80 mcg/kg/min at 11/18/20 0459   • fentaNYL (SUBLIMAZE) 50 mcg/mL in 50mL (Continuous Infusion)   Intravenous Continuous Patti Park M.D. 6 mL/hr at 11/18/20 0022 2,500 mcg at 11/18/20 0524   • furosemide (LASIX) injection 20 mg  20 mg Intravenous Q DAY Patti Park M.D.   20 mg at 11/18/20 0459   • promethazine (PHENERGAN) tablet 12.5-25 mg  12.5-25 mg Enteral Tube Q4HRS PRN Patti Park M.D.       • Respiratory Therapy Consult   Nebulization Continuous RT Mahsa Reis M.D.       • ipratropium-albuterol (DUONEB) nebulizer solution  3 mL Nebulization Q2HRS PRN (RT) Mahsa Reis M.D.       • famotidine (PEPCID) tablet 20 mg  20 mg Enteral Tube Q12HRS Mahsa Reis M.D.   20 mg at 11/18/20 0459    Or   • famotidine (PEPCID) injection 20 mg  20 mg Intravenous Q12HRS Mahsa Reis M.D.   20 mg at 11/17/20 1722   • Pharmacy Consult: Enteral tube insertion - review meds/change route/product selection  1 Each Other PHARMACY TO DOSE Mahsa Reis M.D.       • insulin regular (HumuLIN R,NovoLIN  R) injection  1-6 Units Subcutaneous Q6HRS Mahsa Reis M.D.   Stopped at 11/17/20 1800    And   • dextrose 50% (D50W) injection 50 mL  50 mL Intravenous Q15 MIN PRN Mahsa Reis M.D.       • senna-docusate (PERICOLACE or SENOKOT S) 8.6-50 MG per tablet 2 Tab  2 Tab Enteral Tube BID Mahsa Reis M.D.   2 Tab at 11/18/20 0459    And   • polyethylene glycol/lytes (MIRALAX) PACKET 1 Packet  1 Packet Enteral Tube QDAY PRN Mahsa Reis M.D.        And   • magnesium hydroxide (MILK OF MAGNESIA) suspension 30 mL  30 mL Enteral Tube QDAY PRN Mahsa Reis M.D.        And   • bisacodyl (DULCOLAX) suppository 10 mg  10 mg Rectal QDAY PRN Mahsa Reis M.D.       • acetaminophen (Tylenol) tablet 650 mg  650 mg Enteral Tube Q6HRS PRN Mahsa Reis M.D.   650 mg at 11/17/20 0853   • ondansetron (ZOFRAN ODT) dispertab 4 mg  4 mg Enteral Tube Q6HRS PRN Mahsa Reis M.D.       • vecuronium (NORCURON) injection 10 mg  10 mg Intravenous Q2HRS PRN Mahsa Reis M.D.   10 mg at 11/17/20 0039   • norepinephrine (Levophed) infusion 8 mg/250 mL (premix)  0-30 mcg/min Intravenous Continuous Patti Park M.D. 1.9 mL/hr at 11/17/20 2121 1 mcg/min at 11/17/20 2121   • promethazine (PHENERGAN) suppository 12.5-25 mg  12.5-25 mg Rectal Q4HRS PRN Pablito Mills Jr., D.O.       • prochlorperazine (COMPAZINE) injection 5-10 mg  5-10 mg Intravenous Q4HRS PRN Pablito Mills Jr., D.O.       • ondansetron (ZOFRAN) syringe/vial injection 4 mg  4 mg Intravenous Q6HRS PRN Pablito Mills Jr., D.O.       • enoxaparin (LOVENOX) inj 40 mg  40 mg Subcutaneous DAILY Pablito Mills Jr., D.O.   40 mg at 11/18/20 0500   • dexamethasone (DECADRON) injection 6 mg  6 mg Intravenous DAILY Pablito Mills Jr., D.O.   6 mg at 11/18/20 0459   • MD Alert...ICU Electrolyte Replacement per Pharmacy   Other PHARMACY TO DOSE Adalberto Galeana M.D.           Fluids    Intake/Output Summary (Last 24 hours) at 11/18/2020 0606  Last data filed at  11/18/2020 0611  Gross per 24 hour   Intake 2304.46 ml   Output 1800 ml   Net 504.46 ml       Laboratory  Recent Labs     11/16/20 0410 11/17/20  0416 11/18/20  0353   ISTATAPH 7.398* 7.478 7.435   ISTATAPCO2 57.4* 51.6* 56.8*   ISTATAPO2 105* 69 78   ISTATATCO2 37* 40* 40*   [id number] 98 94 95   ISTATARTHCO3 35.4* 38.3* 38.1*   ISTATARTBE 9* 13* 12*   ISTATTEMP 37.2 C 37.5 C 36.8 C   ISTATFIO2 50 80 70   ISTATSPEC Arterial Arterial Arterial   ISTATAPHTC 7.396* 7.471 7.438   [id number] 106* 71 77         Recent Labs     11/16/20 0415 11/17/20  0430 11/18/20  0530   SODIUM 138 138 136   POTASSIUM 3.7 3.7 3.6   CHLORIDE 97 96 96   CO2 35* 36* 38*   BUN 21 24* 24*   CREATININE 0.57 0.52 0.43*   MAGNESIUM 2.5 2.4 2.3   PHOSPHORUS 2.3* 1.7* 2.9   CALCIUM 8.2* 7.9* 8.0*     Recent Labs     11/16/20 0415 11/16/20  1800 11/17/20  0430 11/18/20  0530   ALTSGPT 46  --  40 38   ASTSGOT 36  --  44 38   ALKPHOSPHAT 64  --  60 56   TBILIRUBIN 0.7  --  0.6 0.6   PREALBUMIN  --  9.7*  --   --    GLUCOSE 175*  --  148* 111*     Recent Labs     11/16/20 0415 11/17/20  0430 11/18/20  0530   WBC 9.5 10.1 10.2   NEUTSPOLYS 82.00* 81.70*  --    LYMPHOCYTES 12.00* 13.00*  --    MONOCYTES 3.00 3.50  --    EOSINOPHILS 3.00 1.70  --    BASOPHILS 0.00 0.00  --    ASTSGOT 36 44 38   ALTSGPT 46 40 38   ALKPHOSPHAT 64 60 56   TBILIRUBIN 0.7 0.6 0.6     Recent Labs     11/16/20 0415 11/17/20  0430 11/18/20  0530   RBC 4.92 4.58* 4.45*   HEMOGLOBIN 14.8 14.0 13.1*   HEMATOCRIT 45.9 42.6 41.8*   PLATELETCT 242 268 229       Imaging  Reviewed    Assessment/Plan  Shock (HCC)  Assessment & Plan  Likely multifactorial:  Sedatives, COVID  Cont vasopressor therapies to maintain MAP goal > 65  Actively titrating levophed for MAP goal    Transaminitis- (present on admission)  Assessment & Plan  Likely 2/2 COVID-19  Avoid hepatotoxins  Monitor synthetic function.    Hypokalemia- (present on admission)  Assessment & Plan  Replete to maintain >  4    Acute respiratory distress syndrome (ARDS) due to COVID-19 virus (HCC)  Assessment & Plan  Meets diagnostic requirements by [facility] criteria  Bilateral, multifocal opacities.  Considered unlikely to be cardiac  Onset < 7 days  PaO2/FiO2 <300  Cont Lasix 20mg IV BID  Paralytics/proning protocol-->worsened P/F ratio: will restart paralytics and proning protocol for 3 additional days    COVID-19 virus infection- (present on admission)  Assessment & Plan  Detected on 11/6/2020  Worsening symptoms prompting admission on 11/12/2020  Decadron 6 mg p.o. daily for 10 days (finishes on 11/21)  Completed 5-day Remdesivir 5-day   Lung protective mechanical ventilation strategies  Paralytics and proning protocols  O2 sats > 85%  Cont diuresis of lasix 20mg IV BID    Acute respiratory failure with hypoxia (HCC)- (present on admission)  Assessment & Plan  Worsened and required intubation on 11/13  Paralytics and proning protocols:  Severe worsening of P/F ratio today-->reparalyzed and restarted proning protocol for 3 more days  RT/O2 protocols  Low volume protective ventilation strategies  Continue aggressive diuresis  SBTs/SAT when off proning/paralytic protocol       VTE:  Lovenox  Ulcer: H2 Antagonist  Lines: Central Line  Ongoing indication addressed, Arterial Line  Ongoing indication addressed and Sawyer Catheter  Ongoing indication addressed    I have performed a physical exam and reviewed and updated ROS and Plan today (11/18/2020). In review of yesterday's note (11/17/2020), there are no changes except as documented above.     Discussed patient condition and risk of morbidity and/or mortality with RN, RT, Pharmacy, Charge nurse / hot rounds and Patient     The patient remains critically ill at this time with severe acute hypoxic respiratory failure due to ARDS from COVID-19 pneumonia requiring full ventilatory support and paralytics with the proning protocol.  Patient remains at high risk of clinical deterioration,  worsening vital organ dysfunction, and death without the above critical care interventions.    Critical care time = 61 minutes in directly providing and coordinating critical care and extensive data review.  No time overlap and excludes procedures.

## 2020-11-18 NOTE — RESPIRATORY CARE
Problem: Ventilation Defect:  Goal: Ability to achieve and maintain unassisted ventilation or tolerate decreased levels of ventilator support  Outcome: PROGRESSING SLOWER THAN EXPECTED                                   Ventilator Daily Summary     Vent Day #6     Ventilator settings changed this shift: Decreased FIO2      APV 32/410/+14 70%     Weaning trials: No. Pt paralyzed     Respiratory Procedures: No     Plan: Continue current ventilator settings and wean mechanical ventilation as tolerated per physician orders.

## 2020-11-19 NOTE — CARE PLAN
Problem: Nutritional:  Goal: Nutrition support tolerated and meeting greater than 85% of estimated needs  Outcome: MET     TF @ goal off propofol.

## 2020-11-19 NOTE — PROGRESS NOTES
Critical Care Progress Note    Date of admission  11/12/2020    Chief Complaint  55 y.o. male admitted 11/12/2020 with COVID pneumonia and acute hypoxic respiratory failure.    Hospital Course  Mr. Costa is a 55 year old male with no past medical history who tested positive for COVID on 11/6/2020 and has progressive worsening of shortness of breath and now admitted to the ICU for acute hypoxic respiratory failure due to COVID pneumonia.  11/13 - remains on max HFNC with NRBM, remdesivir/decadron, starting lasix, intubated and proned/paralyzed  11/14 - full vent support, 16/70%, diuresing well with lasix, P/F ratio 178, compliance 35, cont proning protocol  11/15 - full vent support, 16/60%, diuresing well, P/F ratio 180, compliance 38, cont proning protocol tonight   11/16 - full vent support, 16/50%, diuresing well, P/F ratio at 210 with compliance of 38, hold proning tonight, stop paralytics  11/17 - full vent support, did not tolerate being off paralytics and holding proning-->p/f ratio 86, compliance of 39, PEEP 14 with FiO2 of 100%, proned/paralyzed  11/18 - full vent support, 14/70%, paralyzed/proned, p/f 111, compliance 44, stopped propofol and started versed  11/19 - full vent support, 14/60%, paralyzed/proned, p/f 137, compliance 47, versed    Interval Problem Update  Reviewed last 24 hour events:   - proned/paralyzed   - supine at 0900   - tachy overnigtht at 120s   - vec at 1.2   - Versed 15   - Fent at 250   - SR/ST 90-120s   - -120s   - Tmax 100   - TFs at goal   - UOP with 1400cc overnight with underwood   - no mobilzited   - vent day #7   - CXR(reviewed): bilateral diffuse opacities   - pepcid/lovenox   - K at 3.9   - AST/ALT 61/55   - decadron 8/10   - s/p remdesivir    Yesterday's Events:   - proned/paralyzed last night   - Vec at 0.4   - SR 70-80s   - SBP 90s   - Levo at 8   - Prop at 80   - Fent at 200   - TFs at 15cc/hr   - no BM for a week   - UOP with 450cc overnight   - vent day #6   -  CXR(reviewed): bilateral basilar opacities, no change   - PEEP 14/70%   - lovenox/pepcid   - lasix 20mg    - K 3.6   - decadron 7/10   - TGs 500c      Review of Systems  Review of Systems   Unable to perform ROS: Intubated        Vital Signs for last 24 hours   Pulse:  [] 111  Resp:  [15-33] 33  BP: ()/(52-75) 118/74  SpO2:  [92 %-100 %] 95 %    Hemodynamic parameters for last 24 hours       Respiratory Information for the last 24 hours  Vent Mode: APVCMV  Rate (breaths/min): 32  Vt Target (mL): 410  PEEP/CPAP: 14  MAP: 17  Control VTE (exp VT): 467    Physical Exam   Physical Exam  Vitals signs and nursing note reviewed.   Constitutional:       Appearance: He is ill-appearing. He is not toxic-appearing or diaphoretic.      Comments: Sedated/paralyzed   HENT:      Head: Normocephalic and atraumatic.      Right Ear: External ear normal.      Left Ear: External ear normal.      Nose: Nose normal. No rhinorrhea.      Mouth/Throat:      Mouth: Mucous membranes are moist.      Comments: ETT in place  Eyes:      General: No scleral icterus.     Conjunctiva/sclera: Conjunctivae normal.      Pupils: Pupils are equal, round, and reactive to light.   Neck:      Musculoskeletal: Normal range of motion and neck supple.      Comments: Right IJ TLC  Cardiovascular:      Rate and Rhythm: Normal rate and regular rhythm.      Pulses: Normal pulses.      Heart sounds: Normal heart sounds. No murmur.   Pulmonary:      Breath sounds: No wheezing.      Comments: Breathing comfortably on the vent after paralysis, clear anteriorly, diminished bases  Chest:      Chest wall: No tenderness.   Abdominal:      Palpations: Abdomen is soft.      Tenderness: There is no abdominal tenderness. There is no guarding or rebound.      Comments: Hypoactive bowel sounds   Musculoskeletal: Normal range of motion.         General: No tenderness.      Right lower leg: No edema.      Left lower leg: No edema.   Lymphadenopathy:      Cervical: No  cervical adenopathy.   Skin:     General: Skin is warm.      Capillary Refill: Capillary refill takes less than 2 seconds.      Findings: No rash.   Neurological:      Comments: Sedated and paralyzed   Psychiatric:      Comments: Unable to assess     essentially no change to exam    Medications  Current Facility-Administered Medications   Medication Dose Route Frequency Provider Last Rate Last Admin   • midazolam (VERSED) premix 125 mg/125 mL NS  15 mg/hr Intravenous Continuous Patti Park M.D. 15 mL/hr at 11/19/20 0249 15 mg/hr at 11/19/20 0249   • artificial tears (EYE LUBRICANT) ophth ointment 1 Application  1 Application Both Eyes Q8HRS Patti Park M.D.   1 Application at 11/19/20 0516   • vecuronium (NORCURON) 60 mg in 5% dextrose 500 mL Infusion  0-1.7 mcg/kg/min Intravenous Continuous Patti Park M.D. 61.5 mL/hr at 11/19/20 0647 1.2 mcg/kg/min at 11/19/20 0647   • vecuronium (NORCURON) injection 10 mg  10 mg Intravenous Q2HRS PRN Patti Park M.D.       • fentaNYL (SUBLIMAZE) 50 mcg/mL in 50mL (Continuous Infusion)   Intravenous Continuous Patti Park M.D. 6 mL/hr at 11/18/20 1229 2,500 mcg at 11/19/20 0531   • furosemide (LASIX) injection 20 mg  20 mg Intravenous Q DAY Patti Park M.D.   20 mg at 11/19/20 0516   • promethazine (PHENERGAN) tablet 12.5-25 mg  12.5-25 mg Enteral Tube Q4HRS PRN Patti Park M.D.       • Respiratory Therapy Consult   Nebulization Continuous RT Mahsa Reis M.D.       • ipratropium-albuterol (DUONEB) nebulizer solution  3 mL Nebulization Q2HRS PRN (RT) Mahsa Reis M.D.       • famotidine (PEPCID) tablet 20 mg  20 mg Enteral Tube Q12HRS Mahsa Reis M.D.   20 mg at 11/18/20 1703    Or   • famotidine (PEPCID) injection 20 mg  20 mg Intravenous Q12HRS Mahsa Reis M.D.   20 mg at 11/19/20 0516   • Pharmacy Consult: Enteral tube insertion - review meds/change route/product selection  1 Each Other PHARMACY TO DOSE Mahsa Reis  M.D.       • insulin regular (HumuLIN R,NovoLIN R) injection  1-6 Units Subcutaneous Q6HRS Mahsa Reis M.D.   Stopped at 11/17/20 1800    And   • dextrose 50% (D50W) injection 50 mL  50 mL Intravenous Q15 MIN PRN Mahsa Reis M.D.       • senna-docusate (PERICOLACE or SENOKOT S) 8.6-50 MG per tablet 2 Tab  2 Tab Enteral Tube BID Mahsa Reis M.D.   2 Tab at 11/19/20 0516    And   • polyethylene glycol/lytes (MIRALAX) PACKET 1 Packet  1 Packet Enteral Tube QDAY PRN Mahsa Reis M.D.   1 Packet at 11/18/20 1054    And   • magnesium hydroxide (MILK OF MAGNESIA) suspension 30 mL  30 mL Enteral Tube QDAY PRN Mahsa Reis M.D.        And   • bisacodyl (DULCOLAX) suppository 10 mg  10 mg Rectal QDAY PRN Mahsa Reis M.D.       • acetaminophen (Tylenol) tablet 650 mg  650 mg Enteral Tube Q6HRS PRN Mahsa Reis M.D.   650 mg at 11/17/20 0853   • ondansetron (ZOFRAN ODT) dispertab 4 mg  4 mg Enteral Tube Q6HRS PRN Mahsa Reis M.D.       • norepinephrine (Levophed) infusion 8 mg/250 mL (premix)  0-30 mcg/min Intravenous Continuous Patti Park M.D.   Stopped at 11/18/20 1723   • promethazine (PHENERGAN) suppository 12.5-25 mg  12.5-25 mg Rectal Q4HRS PRN Pablito Mills Jr., D.O.       • prochlorperazine (COMPAZINE) injection 5-10 mg  5-10 mg Intravenous Q4HRS PRN Pablito Mills Jr., D.O.       • ondansetron (ZOFRAN) syringe/vial injection 4 mg  4 mg Intravenous Q6HRS PRN Pablito Mills Jr., D.O.       • enoxaparin (LOVENOX) inj 40 mg  40 mg Subcutaneous DAILY Pablito Mills Jr., D.O.   40 mg at 11/19/20 0515   • dexamethasone (DECADRON) injection 6 mg  6 mg Intravenous DAILY Pablito Mills Jr., D.O.   6 mg at 11/19/20 0516   • MD Alert...ICU Electrolyte Replacement per Pharmacy   Other PHARMACY TO DOSE Adalberto Galeana M.D.           Fluids    Intake/Output Summary (Last 24 hours) at 11/19/2020 0705  Last data filed at 11/19/2020 0646  Gross per 24 hour   Intake 1599.15 ml   Output 2695 ml    Net -1095.85 ml       Laboratory  Recent Labs     11/17/20  0416 11/18/20  0353 11/19/20  0349   ISTATAPH 7.478 7.435 7.444   ISTATAPCO2 51.6* 56.8* 57.6*   ISTATAPO2 69 78 82   ISTATATCO2 40* 40* 41*   [id number] 94 95 96   ISTATARTHCO3 38.3* 38.1* 39.5*   ISTATARTBE 13* 12* 13*   ISTATTEMP 37.5 C 36.8 C 37.8 C   ISTATFIO2 80 70 60   ISTATSPEC Arterial Arterial Arterial   ISTATAPHTC 7.471 7.438 7.432   [id number] 71 77 87         Recent Labs     11/17/20  0430 11/18/20  0530 11/19/20  0400   SODIUM 138 136 134*   POTASSIUM 3.7 3.6 3.9   CHLORIDE 96 96 95*   CO2 36* 38* 35*   BUN 24* 24* 24*   CREATININE 0.52 0.43* 0.44*   MAGNESIUM 2.4 2.3 2.1   PHOSPHORUS 1.7* 2.9  --    CALCIUM 7.9* 8.0* 8.2*     Recent Labs     11/16/20  1800 11/17/20  0430 11/18/20  0530 11/19/20  0400   ALTSGPT  --  40 38 55*   ASTSGOT  --  44 38 61*   ALKPHOSPHAT  --  60 56 65   TBILIRUBIN  --  0.6 0.6 0.6   PREALBUMIN 9.7*  --   --   --    GLUCOSE  --  148* 111* 140*     Recent Labs     11/17/20  0430 11/18/20  0530 11/19/20  0400   WBC 10.1 10.2 7.9   NEUTSPOLYS 81.70* 77.70* 82.30*   LYMPHOCYTES 13.00* 10.70* 8.50*   MONOCYTES 3.50 2.70 2.80   EOSINOPHILS 1.70 4.50 1.00   BASOPHILS 0.00 0.00 0.40   ASTSGOT 44 38 61*   ALTSGPT 40 38 55*   ALKPHOSPHAT 60 56 65   TBILIRUBIN 0.6 0.6 0.6     Recent Labs     11/17/20  0430 11/18/20  0530 11/19/20  0400   RBC 4.58* 4.45* 5.22   HEMOGLOBIN 14.0 13.1* 15.8   HEMATOCRIT 42.6 41.8* 48.2   PLATELETCT 268 229 181       Imaging  Reviewed    Assessment/Plan  Shock (HCC)  Assessment & Plan  Likely multifactorial:  Sedatives, COVID  Cont vasopressor therapies as needed to maintain MAP goal > 65  Off vasopressors today    Transaminitis- (present on admission)  Assessment & Plan  Likely 2/2 COVID-19  Avoid hepatotoxins  Monitor synthetic function.    Hypokalemia- (present on admission)  Assessment & Plan  Replete to maintain > 4    Acute respiratory distress syndrome (ARDS) due to COVID-19 virus  (HCC)  Assessment & Plan  Meets diagnostic requirements by [place] criteria  Bilateral, multifocal opacities.  Considered unlikely to be cardiac  Onset < 7 days  PaO2/FiO2 <300  Cont Lasix 20mg IV daily  Paralytics/proning protocol-->P/F ratio at 137: will restart paralytics and proning protocol for 3 additional days    COVID-19 virus infection- (present on admission)  Assessment & Plan  Detected on 11/6/2020  Worsening symptoms prompting admission on 11/12/2020  Decadron 6 mg p.o. daily for 10 days (finishes on 11/21)  Completed 5-day Remdesivir 5-day   Lung protective mechanical ventilation strategies  Paralytics and proning protocols  O2 sats > 85%  Cont diuresis of lasix 20mg IV daily    Acute respiratory failure with hypoxia (HCC)- (present on admission)  Assessment & Plan  Worsened and required intubation on 11/13  Paralytics and proning protocols:   P/F ratio 137-->reparalyzed and restarted proning protocol for 3 more days: will finish tomorrow  RT/O2 protocols  Low volume protective ventilation strategies  Continue diuresis with Lasix 20mg IV daily  SBTs/SAT when off proning/paralytic protocol       VTE:  Lovenox  Ulcer: H2 Antagonist  Lines: Central Line  Ongoing indication addressed, Arterial Line  Ongoing indication addressed and Sawyer Catheter  Ongoing indication addressed    I have performed a physical exam and reviewed and updated ROS and Plan today (11/19/2020). In review of yesterday's note (11/18/2020), there are no changes except as documented above.     Discussed patient condition and risk of morbidity and/or mortality with RN, RT, Pharmacy, Charge nurse / hot rounds and Patient     The patient remains critically ill at this time with severe acute hypoxic respiratory failure due to ARDS from COVID-19 pneumonia requiring full ventilatory support with paralytics and the proning protocol.  Patient remains at high risk of clinical deterioration, worsening vital organ function, and death without the  above critical care interventions..    Critical care time = 63 minutes in directly providing and coordinating critical care and extensive data review.  No time overlap and excludes procedures.

## 2020-11-20 NOTE — PROGRESS NOTES
Critical Care Progress Note    Date of admission  11/12/2020    Chief Complaint  55 y.o. male admitted 11/12/2020 with COVID pneumonia and acute hypoxic respiratory failure.    Hospital Course  Mr. Costa is a 55 year old male with no past medical history who tested positive for COVID on 11/6/2020 and has progressive worsening of shortness of breath and now admitted to the ICU for acute hypoxic respiratory failure due to COVID pneumonia.  11/13 - remains on max HFNC with NRBM, remdesivir/decadron, starting lasix, intubated and proned/paralyzed  11/14 - full vent support, 16/70%, diuresing well with lasix, P/F ratio 178, compliance 35, cont proning protocol  11/15 - full vent support, 16/60%, diuresing well, P/F ratio 180, compliance 38, cont proning protocol tonight   11/16 - full vent support, 16/50%, diuresing well, P/F ratio at 210 with compliance of 38, hold proning tonight, stop paralytics  11/17 - full vent support, did not tolerate being off paralytics and holding proning-->p/f ratio 86, compliance of 39, PEEP 14 with FiO2 of 100%, proned/paralyzed  11/18 - full vent support, 14/70%, paralyzed/proned, p/f 111, compliance 44, stopped propofol and started versed  11/19 - full vent support, 14/60%, paralyzed/proned, p/f 137, compliance 47, versed  11/20 - full vent support, 14/60%, hold proning, continue paralytics, p/f 153, compliance 27      Interval Problem Update  Reviewed last 24 hour events:   - proned/paralyzed   - no events overnight   - vec at 1.2   - versed at 15   - fent at 300   - SR 80-100s   - -120s   - Tmax 37.5   - last BM on 11/12   - TFs at goal   - UOP of 1050cc overnight with underwood   - vent day #8   - CXR(reviewed):worsening bilateral diffuse opacities   - PEEP 14/50%-->drop to 12 of peep   - p/f ratio 153   - pepcid/lovenox   - s/p remdesivir   - day 10/10 of decadron    Supine at 10:30      Review of Systems  Review of Systems   Unable to perform ROS: Intubated        Vital Signs  for last 24 hours   Temp:  [37.2 °C (99 °F)-38 °C (100.4 °F)] 37.2 °C (99 °F)  Pulse:  [] 94  Resp:  [32] 32  BP: ()/(57-79) 98/65  SpO2:  [93 %-96 %] 96 %    Hemodynamic parameters for last 24 hours       Respiratory Information for the last 24 hours  Vent Mode: APVCMV  Rate (breaths/min): 32  Vt Target (mL): 410  PEEP/CPAP: 14  MAP: 19  Control VTE (exp VT): 413    Physical Exam   Physical Exam  Vitals signs and nursing note reviewed.   Constitutional:       Appearance: He is ill-appearing. He is not toxic-appearing or diaphoretic.      Comments: Sedated/paralyzed   HENT:      Head: Normocephalic and atraumatic.      Right Ear: External ear normal.      Left Ear: External ear normal.      Nose: Nose normal. No rhinorrhea.      Mouth/Throat:      Mouth: Mucous membranes are moist.      Comments: ETT in place  Eyes:      General: No scleral icterus.     Conjunctiva/sclera: Conjunctivae normal.      Pupils: Pupils are equal, round, and reactive to light.      Comments: Periorbital edema bilaterally   Neck:      Musculoskeletal: Normal range of motion and neck supple.      Comments: Right IJ TLC  Cardiovascular:      Rate and Rhythm: Normal rate and regular rhythm.      Pulses: Normal pulses.      Heart sounds: Normal heart sounds. No murmur.   Pulmonary:      Breath sounds: No wheezing.      Comments: Breathing comfortably on the vent, clear anteriorly, diminished bases  Chest:      Chest wall: No tenderness.   Abdominal:      Palpations: Abdomen is soft.      Tenderness: There is no abdominal tenderness. There is no guarding or rebound.      Comments: Hypoactive bowel sounds   Musculoskeletal: Normal range of motion.         General: Swelling present. No tenderness.      Right lower leg: Edema present.      Left lower leg: Edema present.      Comments: Diffuse generalized edema   Lymphadenopathy:      Cervical: No cervical adenopathy.   Skin:     General: Skin is warm.      Capillary Refill: Capillary  refill takes less than 2 seconds.      Findings: No rash.   Neurological:      Comments: Sedated and paralyzed   Psychiatric:      Comments: Unable to assess         Medications  Current Facility-Administered Medications   Medication Dose Route Frequency Provider Last Rate Last Admin   • potassium bicarbonate (KLYTE) effervescent tablet 50 mEq  50 mEq Enteral Tube Once Patti Park M.D.       • midazolam (VERSED) premix 125 mg/125 mL NS  15 mg/hr Intravenous Continuous Patti Park M.D. 15 mL/hr at 11/20/20 0513 15 mg/hr at 11/20/20 0513   • artificial tears (EYE LUBRICANT) ophth ointment 1 Application  1 Application Both Eyes Q8HRS Patti Park M.D.   1 Application at 11/20/20 0513   • vecuronium (NORCURON) 60 mg in 5% dextrose 500 mL Infusion  0-1.7 mcg/kg/min Intravenous Continuous Patti Park M.D. 3.6 mL/hr at 11/19/20 2255 0.07 mcg/kg/min at 11/19/20 2255   • vecuronium (NORCURON) injection 10 mg  10 mg Intravenous Q2HRS PRN Patti Park M.D.       • fentaNYL (SUBLIMAZE) 50 mcg/mL in 50mL (Continuous Infusion)   Intravenous Continuous Patti Park M.D. 6 mL/hr at 11/19/20 2307 300 mcg/hr at 11/19/20 2307   • furosemide (LASIX) injection 20 mg  20 mg Intravenous Q DAY Patti Park M.D.   20 mg at 11/20/20 0514   • promethazine (PHENERGAN) tablet 12.5-25 mg  12.5-25 mg Enteral Tube Q4HRS PRN Patti Park M.D.       • Respiratory Therapy Consult   Nebulization Continuous RT Mahsa Reis M.D.       • ipratropium-albuterol (DUONEB) nebulizer solution  3 mL Nebulization Q2HRS PRN (RT) Mahsa Reis M.D.       • famotidine (PEPCID) tablet 20 mg  20 mg Enteral Tube Q12HRS Mahsa Reis M.D.   20 mg at 11/19/20 1738    Or   • famotidine (PEPCID) injection 20 mg  20 mg Intravenous Q12HRS Mahsa Reis M.D.   20 mg at 11/20/20 0514   • Pharmacy Consult: Enteral tube insertion - review meds/change route/product selection  1 Each Other PHARMACY TO DOSE Mahsa Reis M.D.        • insulin regular (HumuLIN R,NovoLIN R) injection  1-6 Units Subcutaneous Q6HRS Mahsa Reis M.D.   Stopped at 11/19/20 1800    And   • dextrose 50% (D50W) injection 50 mL  50 mL Intravenous Q15 MIN PRN Mahsa Reis M.D.       • senna-docusate (PERICOLACE or SENOKOT S) 8.6-50 MG per tablet 2 Tab  2 Tab Enteral Tube BID Mahsa Reis M.D.   2 Tab at 11/20/20 0514    And   • polyethylene glycol/lytes (MIRALAX) PACKET 1 Packet  1 Packet Enteral Tube QDAY PRN Mahsa Reis M.D.   1 Packet at 11/18/20 1054    And   • magnesium hydroxide (MILK OF MAGNESIA) suspension 30 mL  30 mL Enteral Tube QDAY PRN Mahsa Reis M.D.   30 mL at 11/19/20 1738    And   • bisacodyl (DULCOLAX) suppository 10 mg  10 mg Rectal QDAY PRN Mahsa Reis M.D.       • acetaminophen (Tylenol) tablet 650 mg  650 mg Enteral Tube Q6HRS PRN Mahsa Reis M.D.   650 mg at 11/17/20 0853   • ondansetron (ZOFRAN ODT) dispertab 4 mg  4 mg Enteral Tube Q6HRS PRN Mahsa Reis M.D.       • norepinephrine (Levophed) infusion 8 mg/250 mL (premix)  0-30 mcg/min Intravenous Continuous Patti Park M.D.   Stopped at 11/18/20 1723   • promethazine (PHENERGAN) suppository 12.5-25 mg  12.5-25 mg Rectal Q4HRS PRN Pablito Mills Jr., D.O.       • prochlorperazine (COMPAZINE) injection 5-10 mg  5-10 mg Intravenous Q4HRS PRN Pablito Mills Jr., D.O.       • ondansetron (ZOFRAN) syringe/vial injection 4 mg  4 mg Intravenous Q6HRS PRN Pablito Mills Jr., D.O.       • enoxaparin (LOVENOX) inj 40 mg  40 mg Subcutaneous DAILY Pablito Mills Jr., D.O.   40 mg at 11/20/20 0513   • dexamethasone (DECADRON) injection 6 mg  6 mg Intravenous DAILY Pablito Mills Jr., D.O.   6 mg at 11/20/20 0513   • MD Alert...ICU Electrolyte Replacement per Pharmacy   Other PHARMACY TO DOSE Adalberto Galeana M.D.           Fluids    Intake/Output Summary (Last 24 hours) at 11/20/2020 0721  Last data filed at 11/20/2020 0600  Gross per 24 hour   Intake 3794.92 ml    Output 2040 ml   Net 1754.92 ml       Laboratory  Recent Labs     11/18/20  0353 11/19/20  0349 11/20/20  0330   ISTATAPH 7.435 7.444 7.463   ISTATAPCO2 56.8* 57.6* 51.3*   ISTATAPO2 78 82 92*   ISTATATCO2 40* 41* 38*   [id number] 95 96 97   ISTATARTHCO3 38.1* 39.5* 36.7*   ISTATARTBE 12* 13* 11*   ISTATTEMP 36.8 C 37.8 C 37.4 C   ISTATFIO2 70 60 60   ISTATSPEC Arterial Arterial Arterial   ISTATAPHTC 7.438 7.432 7.457   [id number] 77 87 94*         Recent Labs     11/18/20  0530 11/19/20  0400 11/20/20  0452   SODIUM 136 134* 135   POTASSIUM 3.6 3.9 3.6   CHLORIDE 96 95* 94*   CO2 38* 35* 39*   BUN 24* 24* 26*   CREATININE 0.43* 0.44* 0.39*   MAGNESIUM 2.3 2.1 2.2   PHOSPHORUS 2.9  --   --    CALCIUM 8.0* 8.2* 8.4*     Recent Labs     11/18/20  0530 11/19/20  0400 11/20/20  0452   ALTSGPT 38 55* 71*   ASTSGOT 38 61* 65*   ALKPHOSPHAT 56 65 61   TBILIRUBIN 0.6 0.6 0.5   GLUCOSE 111* 140* 139*     Recent Labs     11/18/20  0530 11/19/20  0400 11/20/20  0452   WBC 10.2 7.9 9.8   NEUTSPOLYS 77.70* 82.30* 86.80*   LYMPHOCYTES 10.70* 8.50* 5.30*   MONOCYTES 2.70 2.80 4.40   EOSINOPHILS 4.50 1.00 0.90   BASOPHILS 0.00 0.40 0.00   ASTSGOT 38 61* 65*   ALTSGPT 38 55* 71*   ALKPHOSPHAT 56 65 61   TBILIRUBIN 0.6 0.6 0.5     Recent Labs     11/18/20  0530 11/19/20  0400 11/20/20  0452   RBC 4.45* 5.22 4.28*   HEMOGLOBIN 13.1* 15.8 13.0*   HEMATOCRIT 41.8* 48.2 40.0*   PLATELETCT 229 181 224       Imaging  Reviewed    Assessment/Plan  Shock (HCC)  Assessment & Plan  Appears to be resolving  Likely multifactorial:  Sedatives, COVID  Cont vasopressor therapies as needed to maintain MAP goal > 65  Remains off vasopressors.    Transaminitis- (present on admission)  Assessment & Plan  Likely 2/2 COVID-19  Avoid hepatotoxins  Monitor synthetic function.    Hypokalemia- (present on admission)  Assessment & Plan  Replete to maintain > 4    Acute respiratory distress syndrome (ARDS) due to COVID-19 virus (HCC)  Assessment &  Plan  Meets diagnostic requirements by [place] criteria  Bilateral, multifocal opacities.  Considered unlikely to be cardiac  Onset < 7 days  PaO2/FiO2 <300  Cont Lasix 20mg IV daily  Paralytics/proning protocol-->P/F ratio at 87: s/p proning protocol x 72 hours twice, hold proning due to no significant change in p/f ratio  Stop paralytics today, cont heavy sedation    COVID-19 virus infection- (present on admission)  Assessment & Plan  Detected on 11/6/2020  Worsening symptoms prompting admission on 11/12/2020  Decadron 6 mg p.o. daily for 10 days (finishes on 11/21)  Completed 5-day Remdesivir 5-day   Lung protective mechanical ventilation strategies  S/p proning protocol x 72 hours twice, stop paralytics, cont heavy sedation  O2 sats > 85%  Cont diuresis of lasix 20mg IV daily    Acute respiratory failure with hypoxia (HCC)- (present on admission)  Assessment & Plan  Worsened and required intubation on 11/13  Paralytics and proning protocols:   P/F ratio 153, pt proned/paralyzed x 72 hours twice since intubation without demonstrative improvement to p/f ratio.  S/p proning protocol/paralytics, cont heavy sedation  RT/O2 protocols  Low volume protective ventilation strategies  Continue diuresis with Lasix 20mg IV daily  SBTs/SAT when off proning/paralytic protocol       VTE:  Lovenox  Ulcer: H2 Antagonist  Lines: Central Line  Ongoing indication addressed, Arterial Line  Ongoing indication addressed and Sawyer Catheter  Ongoing indication addressed    I have performed a physical exam and reviewed and updated ROS and Plan today (11/20/2020). In review of yesterday's note (11/19/2020), there are no changes except as documented above.     Discussed patient condition and risk of morbidity and/or mortality with RN, RT, Pharmacy, Charge nurse / hot rounds and Patient     The patient remains critically ill at this time with severe acute hypoxic respiratory failure due to ARDS from COVID-19 pneumonia requiring full  ventilatory support and titration of FiO2.  The patient has undergone the proning protocol with paralytics twice during his time in the ventilator.  There was no demonstrative change to his P/F ratio doing the proning protocol.  The patient remains at increased risk of clinical deterioration, worsening vital organ dysfunction, and death without the above critical care interventions.    Critical care time = 52 minutes in directly providing and coordinating critical care and extensive data review.  No time overlap and excludes procedures.

## 2020-11-20 NOTE — CONSULTS
"Reason for PC Consult: Advance Care Planning    Consulted by: Dr Park     Assessment:  General: 56 yo admitted to [facility] 11/12/2020 with worsening dyspnea for 6 days. Per Dr. Landin the pt tested positive for Covid. Pt became progressively worse with fever, chills, myalgia, loss of smell and taste and dyspnea. Pt was placed on high flow and admitted to the ICU with acute hypoxic respiratory failure secondary to Covid 19 induced ARDS. Pt was intubated 11/13/2020 per Dr. Park and has been intubated for 8 days with full vent support, paralyzed and proned. Pt was changed to a DNR 11/12 but after the RN explained to pt with translation pt changed his mind and wanted to be full code.      Social: Pt has a S.O listed Traci Nunez [phone] she needs translation service. Pt does not have any children. Pt and Traci have lived together for the past 3 yrs and have known each other for 6 yrs. Traci has a daughter who also helps her mother with updates. Pt has two brothers Rashel Costa [phone] and Yair Costa. PC RN will contact Rashel as well.     Consults: Pulmonary  Dyspnea: Yes- intubated  Last BM: 11/12/20-    Pain: No-    Depression: No-    Dementia: No;       Spiritual:  Is Voodoo or spirituality important for coping with this illness? Yes- Sabianist  Has a  or spiritual provider visit been requested? Yes    Palliative Performance Scale: 20%    Advance Directive: None-    DPOA: No-    POLST: No-      Code Status: Full-      Outcome:  PC RN communicated with Dr. Park to get an update and then called significant other Traci. RN introduced self and role of Palliative care using translation service Kat ([id number]). Traci stated that her daughter has been calling the floor and updating her. Assesed Traci understanding of pt's current medical status, over all health picture, and options for future care.  Traci replied, \" I do not have too much information I know that he needs " "oxygen.\" She stated that Iain was not able to breath that is why he came into the hospital.\"  She stated that he was independent prior to admission and did not have any health issues prior to this admission.   Explored pt's values, beliefs, and preferences in order to identify GOC. RN educated Traci about Iain needing to be intubated and have support from the ventilator.  PC RN also educated Traci about Covid 19 and how it affects pt's lungs. Traci asked, \"does he still have Covid.\" RN replied that yes they tested on 11/6/2020. Traci also asked if other pt's with Covid are needing the same kind of treatment. RN helped with answering Traci's questions and concerns. Explained that Iain's code status is \"full code\" meaning CPR would be provided if Iain's heart were to stop. Traci stated, \"No I do not want to change that, it is in the power of the lord.\"  She expressed that she hopes that the Doctors can continue treatment and, \"we have a \"miracle\"  Pt daughter came onto the phone and asked what the likely luna of pt surviving this. PC RN stated that we would take it day by day and they can call the bedside RN's for continued updates. Expressed that the outcome has not been good for many pt's that have required intubation and ventilation support. Traci's daughter became tearful and along with Traci and stated that they would continue to pray. Traci would like this RN to contact pt's brother Rashel and update him with the same information.    Reached out to Rashel pt's oldest brother and educated Rashel on pt's current condition and explained Code status. Rashel stated that he is good with Han making any decisions about his care and that they all communicate about about what is going on with Iain.     Advance directive discussed and pt does not have AD at home per significant other.     Spiritual care offered and Traci would like a  to see Iain for prayer. She stated that their meri is very " important to them. Active listening, and empathic support provided throughout conversation. PC RN answered both Traci's and her daughters questions. PC contact information given and encouraged to call with any further questions or concerns.     Updated: DENZEL RN     Plan: Remain full code at this time. Continue to provide support as needed     Thank you for allowing Palliative Care to participate in this patient's care. Please feel free to call [phone] with any questions or concerns.

## 2020-11-21 NOTE — DIETARY
Nutrition support weekly update:  Day 8 of admit.  Iain Costa is a 55 y.o. male with admitting DX of Acute respiratory failure with hypoxia (HCC).    Tube feeding initiated on 11/14.  Current TF via gastric Cortrak: Peptamen Intense VHP @ goal rate 60 ml/hr, providing 1440 kcal, 133 grams protein, and 1210 ml free water per day.    Assessment:  Weight 101.5 kg with BMI 34.02.  Weight stable.    Estimated nutritional needs:  REE per MSJ = 1821 kcal/day  RMR per PSU (vent L/min 13.2, T max/24 hours 37.5) = 2206 kcal/day (65-70% = 7820-4511 kcal/day)  11-14 kcal/kg = 1543-6739 kcal/day  2.0 grams protein/kg IBW (70 kg) = 140 grams/day    Evaluation:   1. Pt remains on full vent support.  2. TF meeting estimated needs per predictive equations.   3. Labs reviewed.  4. Meds include lasix, insulin, electrolyte replacement, bowel regimen, and VEC.  5. Last BM 11/12, receiving escalating bowel meds per protocol.  6. Current feeding regimen remains appropriate.    Malnutrition risk: None identified.    Recommendations/Plan:  1. Continue TF formula and rate.  2. Fluids per MD.  3. Aggressive bowel regimen.    RD following.

## 2020-11-21 NOTE — PROGRESS NOTES
Spiritual Care Note    Patient Information     Patient's Name: Iain Costa   MRN: [id number]    YOB: 1965   Age and Gender: 55 y.o. male   Service Area: [facility]   Room (and Bed): [place] [address]   Ethnicity or Nationality:     Primary Language: Samoan   Jainism/Spiritual preference: Not able or decline to answer   Place of Residence: [place]   Family/Friends/Others Present: No   Clinical Team Present: Yes (RN)   Medical Diagnosis(-es)/Procedure(s): Covid-19   Code Status: Full Code    Date of Admission: 11/12/2020   Length of Stay: 9 days        Spiritual Care Provider Information:  Name of Spiritual Care Provider: Kristen Villaseñor  Title of Spiritual Care Provider: Associate   Phone Number: [phone]  E-mail: [email or web address]  Total time : 10 minutes    Spiritual Screen Results:    Gen Nursing  Spiritual Screen  Was spiritual care education provided to the patient?: Yes     Palliative Care  PC Jainism/Spiritual Screening  Is your spiritual health or inner well-being important to you as you cope with your medical condition?: Yes  Would you like to receive a visit from our Spiritual Care team or your own Taoism or spiritual leader?: Yes  Was spiritual care education provided to the patient?: Yes  PC Sprituality screening comment: Muslim      Encounter/Request Information  Encounter/Request Type   Visited With: Patient  Nature of the Visit: Initial, On shift  Crisis Visit: Critical care  Referral From/ Origin of Request: Epic nursing    Religous Needs/Values  Jainism Needs Visit  Jainism Needs: Prayer    Spiritual Assessment     Spiritual Care Encounters    Observations/Symptoms: (Pt intubated and nonresponsive.)    Interaction/Conversation: Because pt is intubated and nonresponsive, RN agreed that the  could stay in the anteroom and offer a prayer (ecumenical, as pt's meri tradition is not identified on chart).      Interventions: Compassioate presence,  prayer.    Outcomes: (Unable to determine.)    Plan: Visit Upon Request    Notes:

## 2020-11-21 NOTE — PROGRESS NOTES
Critical Care Progress Note    Date of admission  11/12/2020    Chief Complaint  55 y.o. male admitted 11/12/2020 with COVID pneumonia and acute hypoxic respiratory failure.    Hospital Course  Mr. Costa is a 55 year old male with no past medical history who tested positive for COVID on 11/6/2020 and has progressive worsening of shortness of breath and now admitted to the ICU for acute hypoxic respiratory failure due to COVID pneumonia.  11/13 - remains on max HFNC with NRBM, remdesivir/decadron, starting lasix, intubated and proned/paralyzed  11/14 - full vent support, 16/70%, diuresing well with lasix, P/F ratio 178, compliance 35, cont proning protocol  11/15 - full vent support, 16/60%, diuresing well, P/F ratio 180, compliance 38, cont proning protocol tonight   11/16 - full vent support, 16/50%, diuresing well, P/F ratio at 210 with compliance of 38, hold proning tonight, stop paralytics  11/17 - full vent support, did not tolerate being off paralytics and holding proning-->p/f ratio 86, compliance of 39, PEEP 14 with FiO2 of 100%, proned/paralyzed  11/18 - full vent support, 14/70%, paralyzed/proned, p/f 111, compliance 44, stopped propofol and started versed  11/19 - full vent support, 14/60%, paralyzed/proned, p/f 137, compliance 47, versed  11/20 - full vent support, 14/60%, hold proning, continue paralytics, p/f 153, compliance 27  11/21 - full vent support, 12/100%, stop paralytics, cont heavy sedation, P/F 87, Compliance 27    Interval Problem Update  Reviewed last 24 hour events:   - stop paralytics   - no overnight events   - versed at 15   - fentanyl at 300   - SR 70-90s   - SBP 90-110s   - afebrile   - TFs at goal   - last BM on 11/12   - not mobilized due to paralytics   - UOP of  1 liters with underwood overnight   - vent day #9   - CXR(reviewed): continued bilateral diffuse opacities   - PEEP at 12, FiO2 at 100%   - lovenox/pepcid   - lasix 20mg IV daily   - na at 129   - K 3.5    Yesterday's  Events:   - proned/paralyzed   - no events overnight   - vec at 1.2   - versed at 15   - fent at 300   - SR 80-100s   - -120s   - Tmax 37.5   - last BM on 11/12   - TFs at goal   - UOP of 1050cc overnight with underwood   - vent day #8   - CXR(reviewed):worsening bilateral diffuse opacities   - PEEP 14/50%-->drop to 12 of peep   - p/f ratio 153   - pepcid/lovenox   - s/p remdesivir   - day 10/10 of decadron    Supine at 10:30      Review of Systems  Review of Systems   Unable to perform ROS: Intubated        Vital Signs for last 24 hours   Temp:  [36.9 °C (98.4 °F)-37 °C (98.6 °F)] 36.9 °C (98.4 °F)  Pulse:  [74-94] 89  Resp:  [21-32] 21  BP: ()/(61-81) 98/64  SpO2:  [95 %-98 %] 98 %    Hemodynamic parameters for last 24 hours       Respiratory Information for the last 24 hours  Vent Mode: APVCMV  Rate (breaths/min): 32  Vt Target (mL): 410  PEEP/CPAP: 12  MAP: 18  Control VTE (exp VT): 420    Physical Exam   Physical Exam  Vitals signs and nursing note reviewed.   Constitutional:       Appearance: He is ill-appearing. He is not toxic-appearing or diaphoretic.      Comments: Sedated/paralyzed   HENT:      Head: Normocephalic and atraumatic.      Right Ear: External ear normal.      Left Ear: External ear normal.      Nose: Nose normal. No rhinorrhea.      Mouth/Throat:      Mouth: Mucous membranes are moist.      Comments: ETT in place  Eyes:      General: No scleral icterus.     Conjunctiva/sclera: Conjunctivae normal.      Pupils: Pupils are equal, round, and reactive to light.      Comments: Periorbital edema bilaterally   Neck:      Musculoskeletal: Normal range of motion and neck supple.      Comments: Right IJ TLC  Cardiovascular:      Rate and Rhythm: Normal rate and regular rhythm.      Pulses: Normal pulses.      Heart sounds: Normal heart sounds. No murmur.   Pulmonary:      Breath sounds: No wheezing.      Comments: Breathing comfortably on the vent, clear anteriorly, diminished bases  Chest:       Chest wall: No tenderness.   Abdominal:      Palpations: Abdomen is soft.      Tenderness: There is no abdominal tenderness. There is no guarding or rebound.      Comments: Hypoactive bowel sounds   Musculoskeletal: Normal range of motion.         General: Swelling present. No tenderness.      Right lower leg: Edema present.      Left lower leg: Edema present.      Comments: Diffuse generalized edema   Lymphadenopathy:      Cervical: No cervical adenopathy.   Skin:     General: Skin is warm.      Capillary Refill: Capillary refill takes less than 2 seconds.      Findings: No rash.   Neurological:      Comments: Sedated and paralyzed   Psychiatric:      Comments: Unable to assess         Medications  Current Facility-Administered Medications   Medication Dose Route Frequency Provider Last Rate Last Admin   • midazolam (VERSED) premix 125 mg/125 mL NS  15 mg/hr Intravenous Continuous Patti Park M.D. 15 mL/hr at 11/21/20 0746 15 mg/hr at 11/21/20 0746   • artificial tears (EYE LUBRICANT) ophth ointment 1 Application  1 Application Both Eyes Q8HRS Patti Park M.D.   1 Application at 11/21/20 0637   • vecuronium (NORCURON) 60 mg in 5% dextrose 500 mL Infusion  0-1.7 mcg/kg/min Intravenous Continuous Patti Park M.D.   Stopped at 11/21/20 0905   • vecuronium (NORCURON) injection 10 mg  10 mg Intravenous Q2HRS PRN Patti Park M.D.       • fentaNYL (SUBLIMAZE) 50 mcg/mL in 50mL (Continuous Infusion)   Intravenous Continuous Patti Park M.D. 6 mL/hr at 11/21/20 0655 300 mcg/hr at 11/21/20 0655   • furosemide (LASIX) injection 20 mg  20 mg Intravenous Q DAY Patti Park M.D.   20 mg at 11/21/20 0637   • promethazine (PHENERGAN) tablet 12.5-25 mg  12.5-25 mg Enteral Tube Q4HRS PRN Patti Park M.D.       • Respiratory Therapy Consult   Nebulization Continuous RT Mahsa Reis M.D.       • ipratropium-albuterol (DUONEB) nebulizer solution  3 mL Nebulization Q2HRS PRN (RT) Mahsa BRANCH  SALLIE Reis       • famotidine (PEPCID) tablet 20 mg  20 mg Enteral Tube Q12HRS Mahsa Reis M.D.   20 mg at 11/19/20 1738    Or   • famotidine (PEPCID) injection 20 mg  20 mg Intravenous Q12HRS Mahsa Reis M.D.   20 mg at 11/21/20 0637   • Pharmacy Consult: Enteral tube insertion - review meds/change route/product selection  1 Each Other PHARMACY TO DOSE Mahsa Reis M.D.       • insulin regular (HumuLIN R,NovoLIN R) injection  1-6 Units Subcutaneous Q6HRS Mahsa Reis M.D.   Stopped at 11/20/20 1724    And   • dextrose 50% (D50W) injection 50 mL  50 mL Intravenous Q15 MIN PRN Mahsa Reis M.D.       • senna-docusate (PERICOLACE or SENOKOT S) 8.6-50 MG per tablet 2 Tab  2 Tab Enteral Tube BID Mahsa Reis M.D.   2 Tab at 11/21/20 0637    And   • polyethylene glycol/lytes (MIRALAX) PACKET 1 Packet  1 Packet Enteral Tube QDAY PRN Mahsa Reis M.D.   1 Packet at 11/21/20 0638    And   • magnesium hydroxide (MILK OF MAGNESIA) suspension 30 mL  30 mL Enteral Tube QDAY PRN Mahsa Reis M.D.   30 mL at 11/19/20 1738    And   • bisacodyl (DULCOLAX) suppository 10 mg  10 mg Rectal QDAY PRN Mahsa Reis M.D.       • acetaminophen (Tylenol) tablet 650 mg  650 mg Enteral Tube Q6HRS PRN Mahsa Reis M.D.   650 mg at 11/17/20 0853   • ondansetron (ZOFRAN ODT) dispertab 4 mg  4 mg Enteral Tube Q6HRS PRN Mahsa Reis M.D.       • promethazine (PHENERGAN) suppository 12.5-25 mg  12.5-25 mg Rectal Q4HRS PRN Pablito Mills Jr., D.O.       • prochlorperazine (COMPAZINE) injection 5-10 mg  5-10 mg Intravenous Q4HRS PRN Pablito Mills Jr., D.O.       • ondansetron (ZOFRAN) syringe/vial injection 4 mg  4 mg Intravenous Q6HRS PRN Pablito Mills Jr., D.O.       • enoxaparin (LOVENOX) inj 40 mg  40 mg Subcutaneous DAILY Pablito Mills Jr., D.O.   40 mg at 11/21/20 0637   • MD Alert...ICU Electrolyte Replacement per Pharmacy   Other PHARMACY TO DOSE Adalberto Galeana M.D.            Fluids    Intake/Output Summary (Last 24 hours) at 11/21/2020 1406  Last data filed at 11/21/2020 1200  Gross per 24 hour   Intake 2889.96 ml   Output 2550 ml   Net 339.96 ml       Laboratory  Recent Labs     11/19/20  0349 11/20/20  0330 11/21/20  0105   ISTATAPH 7.444 7.463 7.465   ISTATAPCO2 57.6* 51.3* 50.7*   ISTATAPO2 82 92* 87   ISTATATCO2 41* 38* 38*   [id number] 96 97 97   ISTATARTHCO3 39.5* 36.7* 36.5*   ISTATARTBE 13* 11* 11*   ISTATTEMP 37.8 C 37.4 C 37.2 C   ISTATFIO2 60 60 100   ISTATSPEC Arterial Arterial Arterial   ISTATAPHTC 7.432 7.457 7.462   [id number] 87 94* 88*         Recent Labs     11/19/20  0400 11/20/20  0452 11/21/20  0515   SODIUM 134* 135 129*   POTASSIUM 3.9 3.6 3.5*   CHLORIDE 95* 94* 90*   CO2 35* 39* 35*   BUN 24* 26* 24*   CREATININE 0.44* 0.39* 0.35*   MAGNESIUM 2.1 2.2 2.1   CALCIUM 8.2* 8.4* 8.3*     Recent Labs     11/19/20  0400 11/20/20  0452 11/21/20  0515   ALTSGPT 55* 71* 78*   ASTSGOT 61* 65* 55*   ALKPHOSPHAT 65 61 61   TBILIRUBIN 0.6 0.5 0.5   GLUCOSE 140* 139* 116*     Recent Labs     11/19/20  0400 11/20/20  0452 11/21/20  0515   WBC 7.9 9.8 10.1   NEUTSPOLYS 82.30* 86.80* 78.40*   LYMPHOCYTES 8.50* 5.30* 11.60*   MONOCYTES 2.80 4.40 4.10   EOSINOPHILS 1.00 0.90 0.90   BASOPHILS 0.40 0.00 0.40   ASTSGOT 61* 65* 55*   ALTSGPT 55* 71* 78*   ALKPHOSPHAT 65 61 61   TBILIRUBIN 0.6 0.5 0.5     Recent Labs     11/19/20  0400 11/20/20  0452 11/21/20  0515   RBC 5.22 4.28* 4.22*   HEMOGLOBIN 15.8 13.0* 12.5*   HEMATOCRIT 48.2 40.0* 38.4*   PLATELETCT 181 224 221       Imaging  Reviewed    Assessment/Plan  Shock (HCC)  Assessment & Plan  Appears to be resolving  Likely multifactorial:  Sedatives, COVID  Cont vasopressor therapies as needed to maintain MAP goal > 65  Remains off vasopressors.    Transaminitis- (present on admission)  Assessment & Plan  Likely 2/2 COVID-19  Avoid hepatotoxins  Monitor synthetic function.    Hypokalemia- (present on admission)  Assessment  & Plan  Replete to maintain > 4    Acute respiratory distress syndrome (ARDS) due to COVID-19 virus (HCC)  Assessment & Plan  Meets diagnostic requirements by Nottingham criteria  Bilateral, multifocal opacities.  Considered unlikely to be cardiac  Onset < 7 days  PaO2/FiO2 <300  Cont Lasix 20mg IV daily  Paralytics/proning protocol-->P/F ratio at 87: s/p proning protocol x 72 hours twice, hold proning due to no significant change in p/f ratio  Stop paralytics today, cont heavy sedation    COVID-19 virus infection- (present on admission)  Assessment & Plan  Detected on 11/6/2020  Worsening symptoms prompting admission on 11/12/2020  Decadron 6 mg p.o. daily for 10 days (finishes on 11/21)  Completed 5-day Remdesivir 5-day   Lung protective mechanical ventilation strategies  S/p proning protocol x 72 hours twice, stop paralytics, cont heavy sedation  O2 sats > 85%  Cont diuresis of lasix 20mg IV daily    Acute respiratory failure with hypoxia (HCC)- (present on admission)  Assessment & Plan  Worsened and required intubation on 11/13  Paralytics and proning protocols:   P/F ratio 153, pt proned/paralyzed x 72 hours twice since intubation without demonstrative improvement to p/f ratio.  S/p proning protocol/paralytics, cont heavy sedation  RT/O2 protocols  Low volume protective ventilation strategies  Continue diuresis with Lasix 20mg IV daily  SBTs/SAT when off proning/paralytic protocol       VTE:  Lovenox  Ulcer: H2 Antagonist  Lines: Central Line  Ongoing indication addressed, Arterial Line  Ongoing indication addressed and Sawyer Catheter  Ongoing indication addressed    I have performed a physical exam and reviewed and updated ROS and Plan today (11/21/2020). In review of yesterday's note (11/20/2020), there are no changes except as documented above.     Discussed patient condition and risk of morbidity and/or mortality with RN, RT, Pharmacy, Charge nurse / hot rounds and Patient     The patient remains critically ill  at this time with severe acute hypoxic respiratory failure due to ARDS from COVID-19 pneumonia requiring full ventilatory support and titration of FiO2.  The patient has undergone the proning protocol with paralytics twice during his time in the ventilator.  There was no demonstrative change to his P/F ratio doing the proning protocol.  At this point we will stop proning protocol and stop paralytics.  We will continue heavy sedation and start considering weaning sedation in the next several 1-2 days.  The patient remains at increased risk of clinical deterioration, worsening vital organ dysfunction, and death without the above critical care interventions.    Critical care time = 45 minutes in directly providing and coordinating critical care and extensive data review.  No time overlap and excludes procedures.

## 2020-11-21 NOTE — PROGRESS NOTES
Restraints d/c'ed as patient is paralyzed and sedated.    MONITOR SUMMARY   0.18/ 0.08/ 0.30  NSR 70-90

## 2020-11-22 NOTE — PROGRESS NOTES
CRISIS CHARTING SUMMARY NOTE   NOTES  2000 - Family updated via phone  0545 - Family updated via phone     MONITOR SUMMARY  -110s  0.16/ 0.08/ 0.30         ----------  I attest that patient's restraints were checked per protocol at least every two hours -Nabil Swenson R.N.

## 2020-11-22 NOTE — PROGRESS NOTES
Critical Care Progress Note    Date of admission  11/12/2020    Chief Complaint  55 y.o. male admitted 11/12/2020 with COVID pneumonia and acute hypoxic respiratory failure.    Hospital Course  Mr. Costa is a 55 year old male with no past medical history who tested positive for COVID on 11/6/2020 and has progressive worsening of shortness of breath and now admitted to the ICU for acute hypoxic respiratory failure due to COVID pneumonia.  11/13 - remains on max HFNC with NRBM, remdesivir/decadron, starting lasix, intubated and proned/paralyzed  11/14 - full vent support, 16/70%, diuresing well with lasix, P/F ratio 178, compliance 35, cont proning protocol  11/15 - full vent support, 16/60%, diuresing well, P/F ratio 180, compliance 38, cont proning protocol tonight   11/16 - full vent support, 16/50%, diuresing well, P/F ratio at 210 with compliance of 38, hold proning tonight, stop paralytics  11/17 - full vent support, did not tolerate being off paralytics and holding proning-->p/f ratio 86, compliance of 39, PEEP 14 with FiO2 of 100%, proned/paralyzed  11/18 - full vent support, 14/70%, paralyzed/proned, p/f 111, compliance 44, stopped propofol and started versed  11/19 - full vent support, 14/60%, paralyzed/proned, p/f 137, compliance 47, versed  11/20 - full vent support, 14/60%, hold proning, continue paralytics, p/f 153, compliance 27  11/21 - full vent support, 12/100%, stop paralytics, cont heavy sedation, P/F 87, Compliance 27      Interval Problem Update  Reviewed last 24 hour events:   - no events overnight   - off paralytics   - versed at 15   - fentanyl at 300   - attempts to open eyes with verbal, not moving extremities   - SR/ST 80-100s   - SBP 80-110s   - afebrile   - adequate UOP overnight with underwood   - no BM for 10 days-->Relistor   - vent day #10   - CXR(reviewed): worsening bilateral diffuse opacities   - PEEP 12/100%-->will increase PEEP to 14   - P/F 87   - compliance 24   -  lovenox/pepcid   - Lasix 20mg IV daily   - Na 129   - K 3.9    Yesterday's Events:   - stop paralytics   - no overnight events   - versed at 15   - fentanyl at 300   - SR 70-90s   - SBP 90-110s   - afebrile   - TFs at goal   - last BM on 11/12   - not mobilized due to paralytics   - UOP of  1 liters with underwood overnight   - vent day #9   - CXR(reviewed): continued bilateral diffuse opacities   - PEEP at 12, FiO2 at 100%   - lovenox/pepcid   - lasix 20mg IV daily   - na at 129   - K 3.5      Review of Systems  Review of Systems   Unable to perform ROS: Intubated        Vital Signs for last 24 hours   Pulse:  [] 103  Resp:  [19-36] 32  BP: ()/(58-81) 96/63  SpO2:  [93 %-98 %] 96 %    Hemodynamic parameters for last 24 hours       Respiratory Information for the last 24 hours  Vent Mode: APVCMV  Rate (breaths/min): 32  Vt Target (mL): 410  PEEP/CPAP: 12  MAP: 18  Control VTE (exp VT): 420    Physical Exam   Physical Exam  Vitals signs and nursing note reviewed.   Constitutional:       Appearance: He is ill-appearing. He is not toxic-appearing or diaphoretic.      Comments: Sedated,   HENT:      Head: Normocephalic and atraumatic.      Right Ear: External ear normal.      Left Ear: External ear normal.      Nose: Nose normal. No rhinorrhea.      Mouth/Throat:      Mouth: Mucous membranes are moist.      Comments: ETT in place  Eyes:      General: No scleral icterus.     Conjunctiva/sclera: Conjunctivae normal.      Pupils: Pupils are equal, round, and reactive to light.      Comments: Periorbital edema bilaterally   Neck:      Musculoskeletal: Normal range of motion and neck supple.      Comments: Right IJ TLC  Cardiovascular:      Rate and Rhythm: Normal rate and regular rhythm.      Pulses: Normal pulses.      Heart sounds: Normal heart sounds. No murmur.   Pulmonary:      Breath sounds: No wheezing.      Comments: Breathing comfortably on the vent, clear anteriorly, diminished bases  Chest:      Chest  wall: No tenderness.   Abdominal:      General: There is distension.      Palpations: Abdomen is soft.      Tenderness: There is no abdominal tenderness. There is no guarding or rebound.      Comments: bowel sounds present, somewhat distended   Musculoskeletal: Normal range of motion.         General: Swelling present. No tenderness.      Right lower leg: Edema present.      Left lower leg: Edema present.      Comments: Diffuse generalized edema   Lymphadenopathy:      Cervical: No cervical adenopathy.   Skin:     General: Skin is warm.      Capillary Refill: Capillary refill takes less than 2 seconds.      Findings: No rash.   Neurological:      Cranial Nerves: No cranial nerve deficit.      Comments: Sedated, grimacing, attempts to open eyes to verbal, not moving extremities yet   Psychiatric:      Comments: Unable to assess         Medications  Current Facility-Administered Medications   Medication Dose Route Frequency Provider Last Rate Last Admin   • fentaNYL (SUBLIMAZE) 50 mcg/mL in 50mL (Continuous Infusion)   Intravenous Continuous Rai Yusuf M.D. 8 mL/hr at 11/22/20 0621 400 mcg/hr at 11/22/20 0621   • midazolam (VERSED) premix 125 mg/125 mL NS  15 mg/hr Intravenous Continuous Patti Park M.D. 15 mL/hr at 11/22/20 0059 15 mg/hr at 11/22/20 0059   • artificial tears (EYE LUBRICANT) ophth ointment 1 Application  1 Application Both Eyes Q8HRS Patti Park M.D.   Stopped at 11/21/20 2200   • vecuronium (NORCURON) 60 mg in 5% dextrose 500 mL Infusion  0-1.7 mcg/kg/min Intravenous Continuous Patti Park M.D.   Stopped at 11/21/20 0905   • vecuronium (NORCURON) injection 10 mg  10 mg Intravenous Q2HRS PRN Patti Park M.D.       • furosemide (LASIX) injection 20 mg  20 mg Intravenous Q DAY Patti Park M.D.   20 mg at 11/21/20 0637   • promethazine (PHENERGAN) tablet 12.5-25 mg  12.5-25 mg Enteral Tube Q4HRS PRN Patti Park M.D.       • Respiratory Therapy Consult    Nebulization Continuous RT Mahsa Reis M.D.       • ipratropium-albuterol (DUONEB) nebulizer solution  3 mL Nebulization Q2HRS PRN (RT) Mahsa Reis M.D.       • famotidine (PEPCID) tablet 20 mg  20 mg Enteral Tube Q12HRS Mahsa Reis M.D.   20 mg at 11/22/20 0559    Or   • famotidine (PEPCID) injection 20 mg  20 mg Intravenous Q12HRS Mahsa Reis M.D.   20 mg at 11/21/20 0637   • Pharmacy Consult: Enteral tube insertion - review meds/change route/product selection  1 Each Other PHARMACY TO DOSE Mahsa Reis M.D.       • insulin regular (HumuLIN R,NovoLIN R) injection  1-6 Units Subcutaneous Q6HRS Mahsa Reis M.D.   Stopped at 11/20/20 1724    And   • dextrose 50% (D50W) injection 50 mL  50 mL Intravenous Q15 MIN PRN Mahsa Reis M.D.       • senna-docusate (PERICOLACE or SENOKOT S) 8.6-50 MG per tablet 2 Tab  2 Tab Enteral Tube BID Mahsa Reis M.D.   2 Tab at 11/22/20 0559    And   • polyethylene glycol/lytes (MIRALAX) PACKET 1 Packet  1 Packet Enteral Tube QDAY PRN Mahsa Reis M.D.   1 Packet at 11/21/20 0638    And   • magnesium hydroxide (MILK OF MAGNESIA) suspension 30 mL  30 mL Enteral Tube QDAY PRN Mahsa Reis M.D.   30 mL at 11/19/20 1738    And   • bisacodyl (DULCOLAX) suppository 10 mg  10 mg Rectal QDAY PRN Mahsa Reis M.D.   10 mg at 11/21/20 2056   • acetaminophen (Tylenol) tablet 650 mg  650 mg Enteral Tube Q6HRS PRN Mahsa Reis M.D.   650 mg at 11/21/20 1812   • ondansetron (ZOFRAN ODT) dispertab 4 mg  4 mg Enteral Tube Q6HRS PRN Mahsa Reis M.D.       • promethazine (PHENERGAN) suppository 12.5-25 mg  12.5-25 mg Rectal Q4HRS PRN Pablito Mills Jr., D.O.       • prochlorperazine (COMPAZINE) injection 5-10 mg  5-10 mg Intravenous Q4HRS PRN Pablito Mills Jr., D.O.       • ondansetron (ZOFRAN) syringe/vial injection 4 mg  4 mg Intravenous Q6HRS PRN Pablito Mills Jr., D.O.       • enoxaparin (LOVENOX) inj 40 mg  40 mg Subcutaneous DAILY Pablito Mills  , D.O.   40 mg at 11/22/20 0600   • MD Alert...ICU Electrolyte Replacement per Pharmacy   Other PHARMACY TO DOSE Adalberto Galeana M.D.           Fluids    Intake/Output Summary (Last 24 hours) at 11/22/2020 0720  Last data filed at 11/22/2020 0612  Gross per 24 hour   Intake 2093.9 ml   Output 2720 ml   Net -626.1 ml       Laboratory  Recent Labs     11/20/20  0330 11/21/20  0105 11/22/20  0124   ISTATAPH 7.463 7.465 7.475   ISTATAPCO2 51.3* 50.7* 49.0*   ISTATAPO2 92* 87 80   ISTATATCO2 38* 38* 38*   [id number] 97 97 96   ISTATARTHCO3 36.7* 36.5* 36.0*   ISTATARTBE 11* 11* 11*   ISTATTEMP 37.4 C 37.2 C 38.3 C   ISTATFIO2 60 100 100   ISTATSPEC Arterial Arterial Arterial   ISTATAPHTC 7.457 7.462 7.455   [id number] 94* 88* 87         Recent Labs     11/20/20  0452 11/21/20  0515 11/22/20  0415   SODIUM 135 129* 129*   POTASSIUM 3.6 3.5* 3.9   CHLORIDE 94* 90* 91*   CO2 39* 35* 35*   BUN 26* 24* 25*   CREATININE 0.39* 0.35* 0.44*   MAGNESIUM 2.2 2.1 2.0   CALCIUM 8.4* 8.3* 8.4*     Recent Labs     11/20/20  0452 11/21/20  0515 11/22/20  0415   ALTSGPT 71* 78* 81*   ASTSGOT 65* 55* 52*   ALKPHOSPHAT 61 61 72   TBILIRUBIN 0.5 0.5 0.7   GLUCOSE 139* 116* 132*     Recent Labs     11/20/20  0452 11/21/20  0515 11/22/20  0415   WBC 9.8 10.1 12.0*   NEUTSPOLYS 86.80* 78.40* 85.90*   LYMPHOCYTES 5.30* 11.60* 6.20*   MONOCYTES 4.40 4.10 4.30   EOSINOPHILS 0.90 0.90 0.80   BASOPHILS 0.00 0.40 0.20   ASTSGOT 65* 55* 52*   ALTSGPT 71* 78* 81*   ALKPHOSPHAT 61 61 72   TBILIRUBIN 0.5 0.5 0.7     Recent Labs     11/20/20  0452 11/21/20  0515 11/22/20  0415   RBC 4.28* 4.22* 4.32*   HEMOGLOBIN 13.0* 12.5* 13.0*   HEMATOCRIT 40.0* 38.4* 39.7*   PLATELETCT 224 221 232       Imaging  Reviewed    Assessment/Plan  Shock (HCC)  Assessment & Plan  Appears to be resolving  Likely multifactorial:  Sedatives, COVID  Cont vasopressor therapies as needed to maintain MAP goal > 65  Remains off vasopressors    Transaminitis- (present on  admission)  Assessment & Plan  Likely 2/2 COVID-19  Avoid hepatotoxins  Monitor synthetic function.    Hypokalemia- (present on admission)  Assessment & Plan  Replete to maintain > 4    Acute respiratory distress syndrome (ARDS) due to COVID-19 virus (HCC)  Assessment & Plan  Meets diagnostic requirements by [place] criteria  Bilateral, multifocal opacities.  Considered unlikely to be cardiac  Onset < 7 days  PaO2/FiO2 <300  Cont Lasix 20mg IV daily  Paralytics/proning protocol-->P/F ratio remains dismal at 80: s/p proning protocol x 72 hours twice, stopped proning due to no significant change in p/f ratio  S/p paralytics  Cont sedation    COVID-19 virus infection- (present on admission)  Assessment & Plan  Detected on 11/6/2020  Worsening symptoms prompting admission on 11/12/2020  Decadron 6 mg p.o. daily for 10 days (finishes on 11/21)  Completed 5-day Remdesivir 5-day   Lung protective mechanical ventilation strategies  S/p proning protocol x 72 hours twice, s/p paralytics, cont sedation  O2 sats > 85%  Cont diuresis of lasix 20mg IV daily    Acute respiratory failure with hypoxia (HCC)- (present on admission)  Assessment & Plan  Worsened and required intubation on 11/13  Paralytics and proning protocols:   P/F ratio 153, pt proned/paralyzed x 72 hours twice since intubation without demonstrative improvement to p/f ratio.  S/p proning protocol/paralytics, cont heavy sedation  RT/O2 protocols  Low volume protective ventilation strategies  Continue diuresis with Lasix 20mg IV daily  SBTs/SAT when off proning/paralytic protocol  No BM in 10 days-->relistor today       VTE:  Lovenox  Ulcer: H2 Antagonist  Lines: Central Line  Ongoing indication addressed, Arterial Line  Ongoing indication addressed and Sawyer Catheter  Ongoing indication addressed    I have performed a physical exam and reviewed and updated ROS and Plan today (11/22/2020). In review of yesterday's note (11/21/2020), there are no changes except as  documented above.     Discussed patient condition and risk of morbidity and/or mortality with RN, RT, Pharmacy, Charge nurse / hot rounds and Patient     The patient remains critically ill at this time with severe acute hypoxic respiratory failure due to ARDS from COVID-19 pneumonia requiring full ventilatory support and titration of FiO2 and PEEP.  We will continue to wean sedation as the patient tolerates as well as diuresis.  The patient remains at increased risk of clinical deterioration, worsening vital organ dysfunction, and death without the above critical care interventions.    Critical care time = 44 minutes in directly providing and coordinating critical care and extensive data review.  No time overlap and excludes procedures.

## 2020-11-23 PROBLEM — J15.9 BACTERIAL PNEUMONIA: Status: ACTIVE | Noted: 2020-01-01

## 2020-11-23 PROBLEM — E87.1 HYPONATREMIA: Status: ACTIVE | Noted: 2020-01-01

## 2020-11-23 PROBLEM — E83.42 HYPOMAGNESEMIA: Status: ACTIVE | Noted: 2020-01-01

## 2020-11-23 NOTE — PROGRESS NOTES
Temperature now 39.0C. Dr. Yusuf notified. Order for cooling blanket. Cooling blanket applied at 0950.

## 2020-11-23 NOTE — PROGRESS NOTES
Temp increased form 38.0C at 0300 to 38.7C at 0415. Ice packs applied, temperature in room decreased, no blankets on patient. Tylenol administered at 0119, too soon for repeat dose.

## 2020-11-23 NOTE — PROGRESS NOTES
"Pharmacy Vancomycin Kinetics     Iain Costa is a 55 y.o. male on vancomycin day # 1 for an indication of PNA.    Anticipated Duration of Therapy:  TBD    Current dosing:  · Vancomycin 2500 mg IV loading dose on 11/23  · Vancomycin 1250 mg IV every 8 hours    History of Present Illness:   Admitted on 11/12/2020 for worsening shortness of breath in the setting of known COVID + status.  He has been on the ventilator since 11/14.  He has been persistently febrile, has increasing respiratory secretions, and his white count is increasing despite completing a course of steroids on 11/20.  Broad spectrum antibiotics initiated to ensure coverage for VAP.  Pertinent past medical history per chart.    Antimicrobial history for admission:   · Ceftriaxone 11/12  · Remdesivir 11/12 - 11/15  · Cefepime 11/23 - current  · Vancomycin 11/23 - current    Allergies: Patient has no known allergies.     Concerns for renal function: Obesity, low albumin/malnutrition    Pertinent cultures to date:   · Tracheal aspirate - not yet collected  · Urinalysis - not yet collected    MRSA nares swab ordered: Yes - not yet collected    Recent Labs     11/21/20  0515 11/22/20  0415 11/23/20  0328   WBC 10.1 12.0* 16.0*   NEUTSPOLYS 78.40* 85.90* 89.60*     Recent Labs     11/21/20  0515 11/22/20  0415 11/23/20  0328   BUN 24* 25* 22   CREATININE 0.35* 0.44* 0.37*   ALBUMIN 2.3* 2.3* 2.5*     No results for input(s): VANCOTROUGH, VANCOPEAK, VANCORANDOM in the last 72 hours.    Intake/Output Summary (Last 24 hours) at 11/23/2020 1230  Last data filed at 11/23/2020 0800  Gross per 24 hour   Intake 2002.82 ml   Output 1600 ml   Net 402.82 ml      /84   Pulse (!) 122   Temp 36.9 °C (98.4 °F)   Resp (!) 28   Ht 1.727 m (5' 8\")   Wt 96.4 kg (212 lb 8.4 oz)   SpO2 93%  No data recorded.      Assessment and Plan:  1. Dosing regimen and changes: New start  2. Next vancomycin level: In 2-3 days, not yet ordered  3. Goal trough: 15-20 mcg/mL for " indication of PNA  4. Comments and plan: New start vancomycin for possible pneumonia.  MRSA nares and procalcitonin pending, plan to obtain tracheal aspirate for cultures.  No significant concerns for renal function or accumulation - dosed per [facility] protocol.  Recommend de-escalation as appropriate based upon clinical status and culture data.  Pharmacy to follow daily during therapy.    Thank you!  Maru Guzmán, PharmD, BCCCP

## 2020-11-23 NOTE — PROGRESS NOTES
Critical Care Progress Note    Date of admission  11/12/2020    Chief Complaint  55 y.o. male admitted 11/12/2020 with COVID pneumonia and acute hypoxic respiratory failure.    Hospital Course  Mr. Costa is a 55 year old male with no past medical history who tested positive for COVID on 11/6/2020 and has progressive worsening of shortness of breath and now admitted to the ICU for acute hypoxic respiratory failure due to COVID pneumonia.  11/13 - remains on max HFNC with NRBM, remdesivir/decadron, starting lasix, intubated and proned/paralyzed  11/14 - full vent support, 16/70%, diuresing well with lasix, P/F ratio 178, compliance 35, cont proning protocol  11/15 - full vent support, 16/60%, diuresing well, P/F ratio 180, compliance 38, cont proning protocol tonight   11/16 - full vent support, 16/50%, diuresing well, P/F ratio at 210 with compliance of 38, hold proning tonight, stop paralytics  11/17 - full vent support, did not tolerate being off paralytics and holding proning-->p/f ratio 86, compliance of 39, PEEP 14 with FiO2 of 100%, proned/paralyzed  11/18 - full vent support, 14/70%, paralyzed/proned, p/f 111, compliance 44, stopped propofol and started versed  11/19 - full vent support, 14/60%, paralyzed/proned, p/f 137, compliance 47, versed  11/20 - full vent support, 14/60%, hold proning, continue paralytics, p/f 153, compliance 27  11/21 - full vent support, 12/100%, stop paralytics, cont heavy sedation, P/F 87, Compliance 27  11/23 -full vent support, sedation weaning, remains febrile    Interval Problem Update  Reviewed last 24 hour events:   - TM 39.1, increasing WBC, cooling blanket   - follows when sedation held (fent 400, versed 12)   - sinus tach, SBP    - more acidotic today   - moisés Tfs at goal, on bowel protocol   - Hgb unchanged at 13   - Na up to 131   - low K, Mag   - glucose well controlled, SSI stopped    Review of Systems  Review of Systems   Unable to perform ROS: Intubated         Vital Signs for last 24 hours   Pulse:  [] 117  Resp:  [19-34] 29  BP: ()/(62-82) 112/72  SpO2:  [91 %-97 %] 94 %    Respiratory Information for the last 24 hours  Vent Mode: APVCMV  Rate (breaths/min): 32  Vt Target (mL): 410  PEEP/CPAP: 14  MAP: 17  Control VTE (exp VT): 471    Physical Exam   Physical Exam  Vitals signs and nursing note reviewed.   Constitutional:       General: He is in acute distress.      Appearance: He is overweight. He is ill-appearing.      Interventions: He is sedated, intubated and restrained.   HENT:      Head: Normocephalic and atraumatic.      Right Ear: External ear normal.      Left Ear: External ear normal.      Nose: Nose normal. No congestion.      Comments: Nasal feeding tube in place     Mouth/Throat:      Mouth: Mucous membranes are moist.      Pharynx: Oropharynx is clear.      Comments: Endotracheal tube in place  Eyes:      General: No scleral icterus.     Conjunctiva/sclera: Conjunctivae normal.      Pupils: Pupils are equal, round, and reactive to light.   Neck:      Musculoskeletal: Neck supple. No neck rigidity.      Comments: Left IJ central venous catheter without surrounding hematoma  Cardiovascular:      Rate and Rhythm: Regular rhythm. Tachycardia present. Occasional extrasystoles are present.     Chest Wall: PMI is not displaced.      Pulses: Normal pulses.      Heart sounds: Heart sounds are distant.   Pulmonary:      Effort: Tachypnea present. No accessory muscle usage. He is intubated.      Breath sounds: Examination of the right-middle field reveals rhonchi. Examination of the left-middle field reveals rhonchi. Examination of the right-lower field reveals rhonchi and rales. Examination of the left-lower field reveals rhonchi and rales. Decreased breath sounds, rhonchi and rales present. No wheezing.      Comments: Plateau pressure 28, minimal secretions  Abdominal:      General: Bowel sounds are normal. There is no distension.      Palpations:  Abdomen is soft.      Tenderness: There is no abdominal tenderness. There is no guarding.   Genitourinary:     Comments: Sawyer catheter in place  Musculoskeletal:         General: No tenderness.      Right lower leg: No edema.      Left lower leg: No edema.   Skin:     General: Skin is warm and dry.      Capillary Refill: Capillary refill takes less than 2 seconds.      Findings: No rash.   Neurological:      General: No focal deficit present.      Mental Status: He is lethargic.      Cranial Nerves: No cranial nerve deficit.      Comments: Moves all extremities, gets agitated with sedation vacation but follows commands intermittently   Psychiatric:      Comments:  unable to assess given current clinical condition         Medications  Current Facility-Administered Medications   Medication Dose Route Frequency Provider Last Rate Last Admin   • potassium bicarbonate (KLYTE) effervescent tablet 25 mEq  25 mEq Enteral Tube Once Jeremy M Gonda, M.D.       • magnesium sulfate IVPB premix 2 g  2 g Intravenous Once Jeremy M Gonda, M.D.       • midazolam (VERSED) premix 125 mg/125 mL NS  0-15 mg/hr Intravenous Continuous Patti Park M.D. 10 mL/hr at 11/23/20 0422 10 mg/hr at 11/23/20 0422   • fentaNYL (SUBLIMAZE) 50 mcg/mL in 50mL (Continuous Infusion)   Intravenous Continuous Patti Park M.D. 8 mL/hr at 11/23/20 0647 400 mcg/hr at 11/23/20 0647   • norepinephrine (Levophed) infusion 8 mg/250 mL (premix)  0-30 mcg/min Intravenous Continuous Patti Park M.D.   Stopped at 11/22/20 1649   • artificial tears (EYE LUBRICANT) ophth ointment 1 Application  1 Application Both Eyes Q8HRS Patti Park M.D.   Stopped at 11/22/20 2200   • furosemide (LASIX) injection 20 mg  20 mg Intravenous Q DAY Patti Park M.D.   20 mg at 11/22/20 0830   • promethazine (PHENERGAN) tablet 12.5-25 mg  12.5-25 mg Enteral Tube Q4HRS PRN Patti Park M.D.       • Respiratory Therapy Consult   Nebulization Continuous RT  Mahsa Reis M.D.       • ipratropium-albuterol (DUONEB) nebulizer solution  3 mL Nebulization Q2HRS PRN (RT) Mahsa Reis M.D.       • famotidine (PEPCID) tablet 20 mg  20 mg Enteral Tube Q12HRS Mahsa Reis M.D.   20 mg at 11/23/20 0602    Or   • famotidine (PEPCID) injection 20 mg  20 mg Intravenous Q12HRS Mahsa Reis M.D.   20 mg at 11/21/20 0637   • Pharmacy Consult: Enteral tube insertion - review meds/change route/product selection  1 Each Other PHARMACY TO DOSE Mahsa Reis M.D.       • insulin regular (HumuLIN R,NovoLIN R) injection  1-6 Units Subcutaneous Q6HRS Mahsa Reis M.D.   1 Units at 11/23/20 0601    And   • dextrose 50% (D50W) injection 50 mL  50 mL Intravenous Q15 MIN PRN Mahsa Reis M.D.       • senna-docusate (PERICOLACE or SENOKOT S) 8.6-50 MG per tablet 2 Tab  2 Tab Enteral Tube BID Mahsa Reis M.D.   2 Tab at 11/23/20 0602    And   • polyethylene glycol/lytes (MIRALAX) PACKET 1 Packet  1 Packet Enteral Tube QDAY PRN Mahsa Reis M.D.   1 Packet at 11/21/20 0638    And   • magnesium hydroxide (MILK OF MAGNESIA) suspension 30 mL  30 mL Enteral Tube QDAY PRN Mahsa Reis M.D.   30 mL at 11/19/20 1738    And   • bisacodyl (DULCOLAX) suppository 10 mg  10 mg Rectal QDAY PRN Mahsa Reis M.D.   10 mg at 11/23/20 0350   • acetaminophen (Tylenol) tablet 650 mg  650 mg Enteral Tube Q6HRS PRN Mahsa Reis M.D.   650 mg at 11/23/20 0119   • ondansetron (ZOFRAN ODT) dispertab 4 mg  4 mg Enteral Tube Q6HRS PRN Mahsa Reis M.D.       • promethazine (PHENERGAN) suppository 12.5-25 mg  12.5-25 mg Rectal Q4HRS PRN Pablito Mills Jr., D.O.       • prochlorperazine (COMPAZINE) injection 5-10 mg  5-10 mg Intravenous Q4HRS PRN Pablito Mills Jr., D.O.       • ondansetron (ZOFRAN) syringe/vial injection 4 mg  4 mg Intravenous Q6HRS PRN Pablito Mills Jr., D.O.       • enoxaparin (LOVENOX) inj 40 mg  40 mg Subcutaneous DAILY Pablito Mills Jr., D.O.   40 mg at  11/23/20 0602   • MD Alert...ICU Electrolyte Replacement per Pharmacy   Other PHARMACY TO DOSE Adalberto Galeana M.D.           Fluids    Intake/Output Summary (Last 24 hours) at 11/23/2020 0710  Last data filed at 11/23/2020 0600  Gross per 24 hour   Intake 2052.82 ml   Output 2270 ml   Net -217.18 ml       Laboratory  Recent Labs     11/21/20  0105 11/22/20  0124   ISTATAPH 7.465 7.475   ISTATAPCO2 50.7* 49.0*   ISTATAPO2 87 80   ISTATATCO2 38* 38*   OVMTPZX9IZR 97 96   ISTATARTHCO3 36.5* 36.0*   ISTATARTBE 11* 11*   ISTATTEMP 37.2 C 38.3 C   ISTATFIO2 100 100   ISTATSPEC Arterial Arterial   ISTATAPHTC 7.462 7.455   KVICCCGB1LV 88* 87         Recent Labs     11/21/20  0515 11/22/20  0415 11/23/20  0328   SODIUM 129* 129* 131*   POTASSIUM 3.5* 3.9 3.8   CHLORIDE 90* 91* 92*   CO2 35* 35* 33   BUN 24* 25* 22   CREATININE 0.35* 0.44* 0.37*   MAGNESIUM 2.1 2.0 1.9   CALCIUM 8.3* 8.4* 8.6     Recent Labs     11/21/20  0515 11/22/20  0415 11/23/20  0328   ALTSGPT 78* 81* 65*   ASTSGOT 55* 52* 35   ALKPHOSPHAT 61 72 77   TBILIRUBIN 0.5 0.7 0.8   GLUCOSE 116* 132* 146*     Recent Labs     11/21/20  0515 11/22/20  0415 11/23/20  0328   WBC 10.1 12.0* 16.0*   NEUTSPOLYS 78.40* 85.90* 89.60*   LYMPHOCYTES 11.60* 6.20* 5.40*   MONOCYTES 4.10 4.30 3.00   EOSINOPHILS 0.90 0.80 0.20   BASOPHILS 0.40 0.20 0.20   ASTSGOT 55* 52* 35   ALTSGPT 78* 81* 65*   ALKPHOSPHAT 61 72 77   TBILIRUBIN 0.5 0.7 0.8     Recent Labs     11/21/20  0515 11/22/20  0415 11/23/20  0328   RBC 4.22* 4.32* 4.33*   HEMOGLOBIN 12.5* 13.0* 13.0*   HEMATOCRIT 38.4* 39.7* 39.8*   PLATELETCT 221 232 242       Imaging  Chest x-ray personally reviewed and compared to prior film showing unchanged diffuse bilateral infiltrates right greater than left, endotracheal tube/gastric tube/left IJ central venous catheter in good position    Personally performed and reviewed bedside cardiac ultrasound revealing an inferior vena cava of 0.8 cm with greater than 50%  inspiratory collapse    Assessment/Plan  * Acute respiratory failure with hypoxia (HCC)- (present on admission)  Assessment & Plan  Intubated on 11/13  Continue full vent support, increase PEEP to 16  Titrate FiO2 to goal SaO2 greater than 85%  RT/O2 protocol  Limit sedatives  Hold SAT/SBT/mobilization given severity of illness  Decrease diuresis    Bacterial pneumonia  Assessment & Plan  Likely superinfection, 11/23  Begin IV cefepime and vancomycin  Reculture    Shock (HCC)  Assessment & Plan  Improved for time being  Monitor blood pressure closely  Consider fluid bolus today if drops blood pressure given evidence of intravascular volume depletion    Acute respiratory distress syndrome (ARDS) due to COVID-19 virus (HCC)  Assessment & Plan  Continue lung protective ventilation strategies  Monitor daily PF and compliance  Limit sedatives as able  Decrease diuresis today    COVID-19 virus infection- (present on admission)  Assessment & Plan  Continue with the following therapies while monitoring closely:  Decadron: Completed 10-day course 11/21  Remdesivir: Completed 5-day course  Diuresis: On hold for today given evidence of intravascular volume depletion  Antitussives, supportive care, monitoring inflammatory markers and LFTs  Maintain strict isolation precautions  S/p 72 hours of proning protocols twice    Transaminitis- (present on admission)  Assessment & Plan  Likely 2/2 COVID-19  Avoid hepatotoxins  Monitor synthetic function    Hyponatremia  Assessment & Plan  Monitor with daily serum sodium level    Hypomagnesemia  Assessment & Plan  Repletion and monitor closely    Hypokalemia- (present on admission)  Assessment & Plan  Repletion and monitor closely       VTE:  Lovenox  Ulcer: H2 Antagonist  Lines: Central Line  Ongoing indication addressed, Arterial Line  To be removed today and Sawyer Catheter  Ongoing indication addressed    I have performed a physical exam and reviewed and updated ROS and Plan today  (11/23/2020). In review of yesterday's note (11/22/2020), there are no changes except as documented above.     Patient remains critically ill today requiring my active management of his mechanical ventilatory support as well as titration of sedative drips. High risk of deterioration and worsening vital organ dysfunction and death without the above critical care interventions.    Discussed patient condition and risk of morbidity and/or mortality with RN, RT, Pharmacy, Charge nurse / hot rounds and Patient.  Critical care time = 89 minutes in directly providing and coordinating critical care and extensive data review.  No time overlap and excludes procedures.

## 2020-11-24 PROBLEM — K59.01 SLOW TRANSIT CONSTIPATION: Status: ACTIVE | Noted: 2020-01-01

## 2020-11-24 NOTE — CARE PLAN
Problem: Safety  Goal: Will remain free from injury  Outcome: PROGRESSING AS EXPECTED  Goal: Will remain free from falls  Outcome: PROGRESSING AS EXPECTED     Problem: Skin Integrity  Goal: Risk for impaired skin integrity will decrease  Outcome: PROGRESSING AS EXPECTED     Problem: Bowel/Gastric:  Goal: Normal bowel function is maintained or improved  Outcome: PROGRESSING SLOWER THAN EXPECTED

## 2020-11-24 NOTE — PROGRESS NOTES
"Pharmacy Vancomycin Kinetics          Iain Costa is a 55 y.o. male on vancomycin day # 2 for an indication of PNA.     Anticipated Duration of Therapy:  TBD     Current dosing:  · Vancomycin 2500 mg IV loading dose on 11/23  · Vancomycin 1250 mg IV every 8 hours     History of Present Illness:   Admitted on 11/12/2020 for worsening shortness of breath in the setting of known COVID + status.  He has been on the ventilator since 11/14.  He has been persistently febrile, has increasing respiratory secretions, and his white count is increasing despite completing a course of steroids on 11/20.  Broad spectrum antibiotics initiated to ensure coverage for VAP.  Pertinent past medical history per chart.     Antimicrobial history for admission:   · Ceftriaxone 11/12  · Remdesivir 11/12 - 11/15  · Cefepime 11/23 - current  · Vancomycin 11/23 - current     Allergies: Patient has no known allergies.      Concerns for renal function: Obesity, low albumin/malnutrition     Pertinent cultures to date:   · Tracheal aspirate - collected  · Blood - NGTD  · Urinalysis -  did not reflex to culture    MRSA nares swab ordered: Yes - negative on 11/23/20    Recent Labs     11/22/20  0415 11/23/20  0328 11/24/20  0321   WBC 12.0* 16.0* 21.2*   NEUTSPOLYS 85.90* 89.60* 90.10*     Recent Labs     11/22/20  0415 11/23/20  0328 11/24/20  0321   BUN 25* 22 29*   CREATININE 0.44* 0.37* 0.54   ALBUMIN 2.3* 2.5*  --      No results for input(s): VANCOTROUGH, VANCOPEAK, VANCORANDOM in the last 72 hours.    Intake/Output Summary (Last 24 hours) at 11/24/2020 1326  Last data filed at 11/24/2020 0800  Gross per 24 hour   Intake 2333.99 ml   Output 2335 ml   Net -1.01 ml      /91   Pulse (!) 115   Temp 36.9 °C (98.4 °F)   Resp (!) 31   Ht 1.727 m (5' 8\")   Wt 96.4 kg (212 lb 8.4 oz)   SpO2 97%  No data recorded.      Assessment and Plan:  1. Dosing regimen and changes: No changes  2. Next vancomycin level: Tomorrow if therapy is to " continue  3. Goal trough: 15-20 mcg/mL for indication of PNA  4. Comments and plan: Continuation of vancomycin to cover possible superimposed bacterial pneumonia given known COVID infection.  MRSA nares negative - continuation of vancomycin x 1 more day per discussion on rounds.  If therapy is to continue past tomorrow, plan to assess a trough level.  Pharmacy to follow daily during therapy.    Thank you!  Maru Guzmán, PharmD, BCCCP

## 2020-11-24 NOTE — PROGRESS NOTES
Critical Care Progress Note    Date of admission  11/12/2020    Chief Complaint  55 y.o. male admitted 11/12/2020 with COVID pneumonia and acute hypoxic respiratory failure.    Hospital Course  Mr. Costa is a 55 year old male with no past medical history who tested positive for COVID on 11/6/2020 and has progressive worsening of shortness of breath and now admitted to the ICU for acute hypoxic respiratory failure due to COVID pneumonia.  11/13 - remains on max HFNC with NRBM, remdesivir/decadron, starting lasix, intubated and proned/paralyzed  11/14 - full vent support, 16/70%, diuresing well with lasix, P/F ratio 178, compliance 35, cont proning protocol  11/15 - full vent support, 16/60%, diuresing well, P/F ratio 180, compliance 38, cont proning protocol tonight   11/16 - full vent support, 16/50%, diuresing well, P/F ratio at 210 with compliance of 38, hold proning tonight, stop paralytics  11/17 - full vent support, did not tolerate being off paralytics and holding proning-->p/f ratio 86, compliance of 39, PEEP 14 with FiO2 of 100%, proned/paralyzed  11/18 - full vent support, 14/70%, paralyzed/proned, p/f 111, compliance 44, stopped propofol and started versed  11/19 - full vent support, 14/60%, paralyzed/proned, p/f 137, compliance 47, versed  11/20 - full vent support, 14/60%, hold proning, continue paralytics, p/f 153, compliance 27  11/21 - full vent support, 12/100%, stop paralytics, cont heavy sedation, P/F 87, Compliance 27  11/23 -full vent support, sedation weaning, remains febrile  11/24 -awake and following, ongoing Vanco and cefepime, Levophed, full vent support    Interval Problem Update  Reviewed last 24 hour events:   - AF, increasing WBC   - sinus tach, SBP    - enema today for constipation   - vanco + cefepime day 2, thick secretions, MRSA nares neg   - possible aspiration this morning of TFs   - Na up to 132   - increasing BUN/crt   - good UOP on lasix   - low K   - levophed @ 5   -  restless on precedex --> started on propofol 70 + fent 500   - started following yesterday   - PCT 0.4    Review of Systems  Review of Systems   Unable to perform ROS: Intubated        Vital Signs for last 24 hours   Pulse:  [] 97  Resp:  [20-38] 24  BP: ()/(57-87) 103/72  SpO2:  [91 %-99 %] 91 %    Respiratory Information for the last 24 hours  Vent Mode: APVCMV  Rate (breaths/min): 32  Vt Target (mL): 410  PEEP/CPAP: 16  MAP: 25  Control VTE (exp VT): 429    Physical Exam   Physical Exam  Vitals signs and nursing note reviewed.   Constitutional:       General: He is awake. He is in acute distress.      Appearance: He is overweight. He is ill-appearing. He is not diaphoretic.      Interventions: He is sedated, intubated and restrained.      Comments: More awake today, following, agitated   HENT:      Head: Normocephalic and atraumatic.      Nose: Nose normal.      Comments: Nasal feeding tube in place     Mouth/Throat:      Mouth: Mucous membranes are moist.      Pharynx: Oropharynx is clear.      Comments: Endotracheal tube in place  Eyes:      Conjunctiva/sclera: Conjunctivae normal.      Pupils: Pupils are equal, round, and reactive to light.   Neck:      Musculoskeletal: Neck supple.      Comments: Left IJ central venous catheter without surrounding hematoma  Cardiovascular:      Rate and Rhythm: Regular rhythm. Tachycardia present. Occasional extrasystoles are present.     Chest Wall: PMI is not displaced.      Pulses: Decreased pulses.           Radial pulses are 1+ on the right side and 1+ on the left side.      Heart sounds: Heart sounds are distant.      Comments: Requiring norepinephrine to maintain adequate blood pressure  Pulmonary:      Effort: Tachypnea present. No retractions. He is intubated.      Breath sounds: Examination of the right-middle field reveals rhonchi. Examination of the left-middle field reveals rhonchi. Examination of the right-lower field reveals rhonchi and rales.  Examination of the left-lower field reveals rhonchi and rales. Rhonchi and rales present. No decreased breath sounds.      Comments: Plateau pressure 20-24, minimal secretions  Abdominal:      General: Bowel sounds are normal. There is no distension.      Palpations: Abdomen is soft.      Tenderness: There is no abdominal tenderness. There is no rebound.   Genitourinary:     Comments: Sawyer catheter in place  Musculoskeletal:         General: No tenderness.      Right lower leg: Edema present.      Left lower leg: Edema present.   Skin:     General: Skin is warm and dry.      Capillary Refill: Capillary refill takes 2 to 3 seconds.      Coloration: Skin is not jaundiced.   Neurological:      General: No focal deficit present.      Mental Status: He is alert.      Cranial Nerves: No cranial nerve deficit.      Motor: Weakness (Diffuse, generalized) present.      Comments: Moves all extremities, gets agitated with sedation vacation but follows commands intermittently   Psychiatric:         Behavior: Behavior is cooperative.      Comments:  unable to assess given current clinical condition         Medications  Current Facility-Administered Medications   Medication Dose Route Frequency Provider Last Rate Last Admin   • potassium bicarbonate (KLYTE) effervescent tablet 25 mEq  25 mEq Enteral Tube Q4HRS Jeremy M Gonda, M.D.       • cefepime (MAXIPIME) 2 g in  mL IVPB  2 g Intravenous Q12HRS Jeremy M Gonda, M.D.   Stopped at 11/24/20 0606   • MD Alert...Vancomycin per Pharmacy   Other PHARMACY TO DOSE Jeremy M Gonda, M.D.       • oxyCODONE immediate-release (ROXICODONE) tablet 5-10 mg  5-10 mg Enteral Tube Q4HRS PRN Jeremy M Gonda, M.D.   10 mg at 11/23/20 1128   • lactulose 20 GM/30ML solution 30 mL  30 mL Enteral Tube TID Jeremy M Gonda, M.D.   30 mL at 11/24/20 0536   • fentaNYL (SUBLIMAZE) injection 100 mcg  100 mcg Intravenous Q15 MIN PRN Jeremy M Gonda, M.D.        And   • fentaNYL (SUBLIMAZE) injection 200 mcg   200 mcg Intravenous Q15 MIN PRN Jeremy M Gonda, M.D.   200 mcg at 11/24/20 0125    And   • fentaNYL (SUBLIMAZE) 50 mcg/mL in 50mL (Continuous Infusion)   Intravenous Continuous Jeremy M Gonda, M.D. 10 mL/hr at 11/24/20 0621 500 mcg/hr at 11/24/20 0621   • furosemide (LASIX) injection 20 mg  20 mg Intravenous BID DIURETIC Jeremy M Gonda, M.D.   20 mg at 11/24/20 0536   • vancomycin (VANCOCIN) 1,250 mg in  mL IVPB  14 mg/kg Intravenous Q8HR Jeremy M Gonda, M.D.   Stopped at 11/24/20 0527   • propofol (DIPRIVAN) injection  0-80 mcg/kg/min Intravenous Continuous Jeremy M Gonda, M.D. 40.5 mL/hr at 11/24/20 0535 70 mcg/kg/min at 11/24/20 0535   • norepinephrine (Levophed) infusion 8 mg/250 mL (premix)  0-30 mcg/min Intravenous Continuous Patti Park M.D. 9.4 mL/hr at 11/23/20 2245 5 mcg/min at 11/23/20 2245   • promethazine (PHENERGAN) tablet 12.5-25 mg  12.5-25 mg Enteral Tube Q4HRS PRN Patti Park M.D.       • Respiratory Therapy Consult   Nebulization Continuous RT Mahsa Reis M.D.       • ipratropium-albuterol (DUONEB) nebulizer solution  3 mL Nebulization Q2HRS PRN (RT) Mahsa Reis M.D.       • famotidine (PEPCID) tablet 20 mg  20 mg Enteral Tube Q12HRS Mahsa Reis M.D.   20 mg at 11/23/20 0602    Or   • famotidine (PEPCID) injection 20 mg  20 mg Intravenous Q12HRS Mahsa Reis M.D.   20 mg at 11/24/20 0536   • Pharmacy Consult: Enteral tube insertion - review meds/change route/product selection  1 Each Other PHARMACY TO DOSE Mahsa Reis M.D.       • senna-docusate (PERICOLACE or SENOKOT S) 8.6-50 MG per tablet 2 Tab  2 Tab Enteral Tube BID Mahsa Reis M.D.   2 Tab at 11/24/20 0536    And   • polyethylene glycol/lytes (MIRALAX) PACKET 1 Packet  1 Packet Enteral Tube QDAY PRN Mahsa Reis M.D.   1 Packet at 11/21/20 0638    And   • magnesium hydroxide (MILK OF MAGNESIA) suspension 30 mL  30 mL Enteral Tube QDAY PRN Mahsa Reis M.D.   30 mL at 11/19/20 1738    And   •  bisacodyl (DULCOLAX) suppository 10 mg  10 mg Rectal QDAY PRN Mahsa Reis M.D.   10 mg at 11/23/20 0350   • acetaminophen (Tylenol) tablet 650 mg  650 mg Enteral Tube Q6HRS PRN Mahsa Reis M.D.   650 mg at 11/23/20 2235   • ondansetron (ZOFRAN ODT) dispertab 4 mg  4 mg Enteral Tube Q6HRS PRN Mahsa Reis M.D.       • promethazine (PHENERGAN) suppository 12.5-25 mg  12.5-25 mg Rectal Q4HRS PRN Pablito Mills Jr., D.O.       • prochlorperazine (COMPAZINE) injection 5-10 mg  5-10 mg Intravenous Q4HRS PRN Pablito Mills Jr., D.O.       • ondansetron (ZOFRAN) syringe/vial injection 4 mg  4 mg Intravenous Q6HRS PRN Pablito Mills Jr., D.O.       • enoxaparin (LOVENOX) inj 40 mg  40 mg Subcutaneous DAILY Pablito Mills Jr., D.O.   40 mg at 11/24/20 0551   • MD Alert...ICU Electrolyte Replacement per Pharmacy   Other PHARMACY TO DOSE Adalberto Galeana M.D.           Fluids    Intake/Output Summary (Last 24 hours) at 11/24/2020 0725  Last data filed at 11/24/2020 0600  Gross per 24 hour   Intake 2417.37 ml   Output 2635 ml   Net -217.63 ml       Laboratory  Recent Labs     11/22/20  0124 11/23/20  0316 11/24/20  0252   ISTATAPH 7.475 7.227* 7.440   ISTATAPCO2 49.0* 71.6* 49.3*   ISTATAPO2 80 95* 70   ISTATATCO2 38* 32 35*   [id number] 96 95 94   ISTATARTHCO3 36.0* 29.7* 33.5*   ISTATARTBE 11* 1 8*   ISTATTEMP 38.3 C 97.6 F 38.2 C   ISTATFIO2 100 100 90   ISTATSPEC Arterial Arterial Arterial   ISTATAPHTC 7.455 7.234* 7.422   [id number] 87 92* 76         Recent Labs     11/22/20  0415 11/23/20 0328 11/24/20  0321   SODIUM 129* 131* 132*   POTASSIUM 3.9 3.8 3.1*   CHLORIDE 91* 92* 93*   CO2 35* 33 34*   BUN 25* 22 29*   CREATININE 0.44* 0.37* 0.54   MAGNESIUM 2.0 1.9 2.3   PHOSPHORUS  --   --  2.8   CALCIUM 8.4* 8.6 9.1     Recent Labs     11/22/20 0415 11/23/20 0328 11/23/20  1905 11/24/20 0321   ALTSGPT 81* 65*  --   --    ASTSGOT 52* 35  --   --    ALKPHOSPHAT 72 77  --   --    TBILIRUBIN 0.7 0.8  --    --    PREALBUMIN  --   --  17.6*  --    GLUCOSE 132* 146*  --  143*     Recent Labs     11/22/20 0415 11/23/20 0328 11/24/20 0321   WBC 12.0* 16.0* 21.2*   NEUTSPOLYS 85.90* 89.60* 90.10*   LYMPHOCYTES 6.20* 5.40* 4.90*   MONOCYTES 4.30 3.00 2.90   EOSINOPHILS 0.80 0.20 0.40   BASOPHILS 0.20 0.20 0.30   ASTSGOT 52* 35  --    ALTSGPT 81* 65*  --    ALKPHOSPHAT 72 77  --    TBILIRUBIN 0.7 0.8  --      Recent Labs     11/22/20 0415 11/23/20 0328 11/24/20 0321   RBC 4.32* 4.33* 4.22*   HEMOGLOBIN 13.0* 13.0* 12.6*   HEMATOCRIT 39.7* 39.8* 38.6*   PLATELETCT 232 242 281       Imaging  Chest x-ray personally reviewed and compared to prior film showing no change in diffuse bilateral infiltrates right greater than left, endotracheal tube/gastric tube/left IJ central venous catheter in good position    Assessment/Plan  * Acute respiratory failure with hypoxia (HCC)- (present on admission)  Assessment & Plan  Intubated on 11/13  Continue full vent support, trial APRV with drop in stretch as tolerates  Titrate FiO2 to goal SaO2 greater than 85%  RT/O2 protocol  Continue to limit sedatives  Hold SBT/mobilization given severity of illness today  Hold Lasix today    Bacterial pneumonia  Assessment & Plan  Likely superinfection, 11/23  Continue cefepime and vancomycin, DC Vanco tomorrow if cultures remain no growth to date  Follow-up on final cultures    [facility] ([facility])  Assessment & Plan  Titrate norepinephrine to maintain MAP greater than 60  Monitor blood pressure closely  Hold Lasix today    Acute respiratory distress syndrome (ARDS) due to COVID-19 virus ([facility])  Assessment & Plan  Continue lung protective ventilation strategies  Monitor daily P:F and compliance  Limit sedatives as able  Resume forced diuresis in 1 to 2 days  Limit sedatives and allow spontaneous breathing as much as possible    COVID-19 virus infection- (present on admission)  Assessment & Plan  Continue with the following therapies while monitoring  closely:  Decadron: Completed 10-day course 11/21  Remdesivir: Completed 5-day course  Diuresis: Discontinue today and reassess daily  Antitussives, supportive care, monitoring inflammatory markers and LFTs  Maintain strict isolation precautions  S/p 72 hours of proning protocols twice    Slow transit constipation  Assessment & Plan  Increase aggressiveness of bowel protocol, start enema  Aspiration precautions    Transaminitis- (present on admission)  Assessment & Plan  Likely 2/2 COVID-19  Avoid hepatotoxins  Monitor synthetic function    Hyponatremia  Assessment & Plan  Improving  Monitor with daily serum sodium level    Hypomagnesemia  Assessment & Plan  Repleted    Hypokalemia- (present on admission)  Assessment & Plan  Replete again today and monitor       VTE:  Lovenox  Ulcer: H2 Antagonist  Lines: Central Line  Ongoing indication addressed and Sawyer Catheter  Ongoing indication addressed    I have performed a physical exam and reviewed and updated ROS and Plan today (11/24/2020). In review of yesterday's note (11/23/2020), there are no changes except as documented above.     Patient is critically ill today requiring active management of his mechanical ventilatory support as well as titration of sedatives and vasopressor. High risk of deterioration and worsening vital organ dysfunction and death without the above critical care interventions.    Discussed patient condition and risk of morbidity and/or mortality with RN, RT, Pharmacy, Charge nurse / hot rounds and Patient.  Critical care time = 60 minutes in directly providing and coordinating critical care and extensive data review.  No time overlap and excludes procedures.

## 2020-11-24 NOTE — PROGRESS NOTES
Cortrak noted to be out of place. Tube feedings on hold. Cortrak repositioned in place and secured with new primapore. STAT abd xray ordered. Will wait for xray results to resume feedings.

## 2020-11-24 NOTE — ASSESSMENT & PLAN NOTE
Initially improved after Relistor and bowel protocol, now recurring -->  Increase aggressiveness of bowel protocol  Aspiration precautions

## 2020-11-25 PROBLEM — N17.0 ACUTE RENAL FAILURE WITH TUBULAR NECROSIS (HCC): Status: ACTIVE | Noted: 2020-01-01

## 2020-11-25 NOTE — PROGRESS NOTES
CRISIS CHARTING SUMMARY NOTE   NOTES  2000 - Family updated via phone  2100 - Significant cuff leak noted - RT to bedside, assessed via glidescope. [place] changed and ETT advanced to 27cm @ the teeth  0130 - Spoke w/ Dr. Smith about patient's abdominal distention and patient's constipation. Okay to hold TF at this time.     MONITOR SUMMARY       ----------  I attest that patient's restraints were checked per protocol at least every two hours -Nabil Swenson R.N.

## 2020-11-25 NOTE — ASSESSMENT & PLAN NOTE
Improved  Avoid nephrotoxins  Monitor creatinine, urine output, electrolytes closely  Continue diuresis with current dose

## 2020-11-25 NOTE — PROGRESS NOTES
Critical Care Progress Note    Date of admission  11/12/2020    Chief Complaint  55 y.o. male admitted 11/12/2020 with COVID pneumonia and acute hypoxic respiratory failure.    Hospital Course  Mr. Costa is a 55 year old male with no past medical history who tested positive for COVID on 11/6/2020 and has progressive worsening of shortness of breath and now admitted to the ICU for acute hypoxic respiratory failure due to COVID pneumonia.  11/13 - remains on max HFNC with NRBM, remdesivir/decadron, starting lasix, intubated and proned/paralyzed  11/14 - full vent support, 16/70%, diuresing well with lasix, P/F ratio 178, compliance 35, cont proning protocol  11/15 - full vent support, 16/60%, diuresing well, P/F ratio 180, compliance 38, cont proning protocol tonight   11/16 - full vent support, 16/50%, diuresing well, P/F ratio at 210 with compliance of 38, hold proning tonight, stop paralytics  11/17 - full vent support, did not tolerate being off paralytics and holding proning-->p/f ratio 86, compliance of 39, PEEP 14 with FiO2 of 100%, proned/paralyzed  11/18 - full vent support, 14/70%, paralyzed/proned, p/f 111, compliance 44, stopped propofol and started versed  11/19 - full vent support, 14/60%, paralyzed/proned, p/f 137, compliance 47, versed  11/20 - full vent support, 14/60%, hold proning, continue paralytics, p/f 153, compliance 27  11/21 - full vent support, 12/100%, stop paralytics, cont heavy sedation, P/F 87, Compliance 27  11/23 -full vent support, sedation weaning, remains febrile  11/24 -awake and following, ongoing Vanco and cefepime, Levophed, full vent support  11/25 -full vent support on APRV, back on Levophed    Interval Problem Update  Reviewed last 24 hour events:   - tolerating APRV drop and stretch, FiO2 down to 70%   - TM 39.2 --> improving this morning, decreasing WBC   - limited BM, distended, TFs on hold   - sinus tach, SBP    - delayed response and follows, Josh   - NE gtt @  7   - scheduled seroquel   - fent 450, prop 65   - Hgb down to 11.4   - UOP declining slightly   - check CVP   - Na up to 133   - vanco/cefepime day 3 --> d/c vanco today   - low K   - improving CHARITY    Review of Systems  Review of Systems   Unable to perform ROS: Intubated        Vital Signs for last 24 hours   Pulse:  [] 93  Resp:  [14-41] 14  BP: ()/(47-93) 101/61  SpO2:  [88 %-97 %] 96 %    Respiratory Information for the last 24 hours  Vent Mode: APRV  Rate (breaths/min): 32  Vt Target (mL): 410  PEEP/CPAP: 16  MAP: 23  Control VTE (exp VT): 613    Physical Exam   Physical Exam  Vitals signs and nursing note reviewed.   Constitutional:       General: He is awake. He is in acute distress.      Appearance: He is overweight. He is ill-appearing.      Interventions: He is sedated, intubated and restrained.   HENT:      Head: Normocephalic and atraumatic.      Right Ear: External ear normal.      Left Ear: External ear normal.      Nose: No congestion.      Comments: Nasal feeding tube in place     Mouth/Throat:      Mouth: Mucous membranes are moist.      Comments: Endotracheal tube in place  Eyes:      General:         Right eye: No discharge.         Left eye: No discharge.      Pupils: Pupils are equal, round, and reactive to light.   Neck:      Musculoskeletal: No neck rigidity.      Comments: Left IJ central venous catheter without surrounding hematoma  Cardiovascular:      Rate and Rhythm: Regular rhythm. Tachycardia present. Occasional extrasystoles are present.     Chest Wall: PMI is not displaced.      Pulses: Decreased pulses.           Radial pulses are 1+ on the right side and 1+ on the left side.      Heart sounds: Heart sounds are distant. No murmur.      Comments: Ongoing vasopressor support  Pulmonary:      Effort: Tachypnea present. No accessory muscle usage or retractions. He is intubated.      Breath sounds: Examination of the right-middle field reveals rhonchi. Examination of the  left-middle field reveals rhonchi. Examination of the right-lower field reveals rhonchi and rales. Examination of the left-lower field reveals rhonchi and rales. Rhonchi and rales present. No wheezing.      Comments: Plateau pressures remain 20-24, minimal secretions  Abdominal:      General: Bowel sounds are normal. There is no distension.      Palpations: Abdomen is soft.      Tenderness: There is no abdominal tenderness. There is no guarding.   Genitourinary:     Comments: Sawyer catheter in place  Musculoskeletal:      Right lower leg: Edema present.      Left lower leg: Edema present.   Skin:     General: Skin is warm and dry.      Capillary Refill: Capillary refill takes 2 to 3 seconds.      Coloration: Skin is not pale.   Neurological:      General: No focal deficit present.      Mental Status: He is alert.      Cranial Nerves: No cranial nerve deficit.      Motor: Weakness (Diffuse, generalized) present.      Comments: Moves all extremities, gets agitated with sedation vacation but follows commands intermittently   Psychiatric:         Behavior: Behavior is cooperative.      Comments:  unable to assess given current clinical condition         Medications  Current Facility-Administered Medications   Medication Dose Route Frequency Provider Last Rate Last Admin   • QUEtiapine (Seroquel) tablet 50 mg  50 mg Enteral Tube Q8HRS Jeremy M Gonda, M.D.   50 mg at 11/25/20 0625   • acetaminophen (Tylenol) tablet 650 mg  650 mg Enteral Tube Q6HRS Jeremy M Gonda, M.D.   650 mg at 11/25/20 0624   • cefepime (MAXIPIME) 2 g in  mL IVPB  2 g Intravenous Q12HRS Jeremy M Gonda, M.D. 200 mL/hr at 11/25/20 0625 2 g at 11/25/20 0625   • MD Alert...Vancomycin per Pharmacy   Other PHARMACY TO DOSE Jeremy M Gonda, M.D.       • oxyCODONE immediate-release (ROXICODONE) tablet 5-10 mg  5-10 mg Enteral Tube Q4HRS PRN Jeremy M Gonda, M.D.   10 mg at 11/23/20 1128   • lactulose 20 GM/30ML solution 30 mL  30 mL Enteral Tube TID  Jeremy M Gonda, M.D.   30 mL at 11/25/20 0624   • fentaNYL (SUBLIMAZE) injection 100 mcg  100 mcg Intravenous Q15 MIN PRN Jeremy M Gonda, M.D.   100 mcg at 11/24/20 1409    And   • fentaNYL (SUBLIMAZE) injection 200 mcg  200 mcg Intravenous Q15 MIN PRN Jeremy M Gonda, M.D.   200 mcg at 11/24/20 2015    And   • fentaNYL (SUBLIMAZE) 50 mcg/mL in 50mL (Continuous Infusion)   Intravenous Continuous Jeremy M Gonda, M.D. 10 mL/hr at 11/24/20 2211 500 mcg at 11/25/20 0320   • vancomycin (VANCOCIN) 1,250 mg in  mL IVPB  14 mg/kg Intravenous Q8HR Jeremy M Gonda, M.D.   Stopped at 11/25/20 0441   • propofol (DIPRIVAN) injection  0-80 mcg/kg/min Intravenous Continuous Jeremy M Gonda, M.D. 40.5 mL/hr at 11/25/20 0516 70 mcg/kg/min at 11/25/20 0516   • norepinephrine (Levophed) infusion 8 mg/250 mL (premix)  0-30 mcg/min Intravenous Continuous Patti Park M.D. 15 mL/hr at 11/24/20 2244 8 mcg/min at 11/24/20 2244   • promethazine (PHENERGAN) tablet 12.5-25 mg  12.5-25 mg Enteral Tube Q4HRS PRN Patti Park M.D.       • Respiratory Therapy Consult   Nebulization Continuous RT Mahsa Reis M.D.       • ipratropium-albuterol (DUONEB) nebulizer solution  3 mL Nebulization Q2HRS PRN (RT) Mahsa Reis M.D.       • famotidine (PEPCID) tablet 20 mg  20 mg Enteral Tube Q12HRS Mahsa Reis M.D.   20 mg at 11/25/20 0624    Or   • famotidine (PEPCID) injection 20 mg  20 mg Intravenous Q12HRS Mahsa Reis M.D.   20 mg at 11/24/20 1719   • Pharmacy Consult: Enteral tube insertion - review meds/change route/product selection  1 Each Other PHARMACY TO DOSE Mahsa Reis M.D.       • senna-docusate (PERICOLACE or SENOKOT S) 8.6-50 MG per tablet 2 Tab  2 Tab Enteral Tube BID Mahsa Reis M.D.   2 Tab at 11/25/20 0624    And   • polyethylene glycol/lytes (MIRALAX) PACKET 1 Packet  1 Packet Enteral Tube QDAY PRN Mahsa Reis M.D.   1 Packet at 11/21/20 0638    And   • magnesium hydroxide (MILK OF MAGNESIA)  suspension 30 mL  30 mL Enteral Tube QDAY PRN Mahsa Reis M.D.   30 mL at 11/19/20 1738    And   • bisacodyl (DULCOLAX) suppository 10 mg  10 mg Rectal QDAY PRN Mahsa Reis M.D.   10 mg at 11/24/20 2324   • ondansetron (ZOFRAN ODT) dispertab 4 mg  4 mg Enteral Tube Q6HRS PRN Mahsa Reis M.D.       • promethazine (PHENERGAN) suppository 12.5-25 mg  12.5-25 mg Rectal Q4HRS PRN Pablito Mills Jr., D.O.       • prochlorperazine (COMPAZINE) injection 5-10 mg  5-10 mg Intravenous Q4HRS PRN Pablito Mills Jr., D.O.       • ondansetron (ZOFRAN) syringe/vial injection 4 mg  4 mg Intravenous Q6HRS PRN Pablito Mills Jr., D.O.       • enoxaparin (LOVENOX) inj 40 mg  40 mg Subcutaneous DAILY Pablito Mills Jr., D.O.   40 mg at 11/25/20 0626   • MD Alert...ICU Electrolyte Replacement per Pharmacy   Other PHARMACY TO DOSE Adalberto Galeana M.D.           Fluids    Intake/Output Summary (Last 24 hours) at 11/25/2020 0705  Last data filed at 11/25/2020 0600  Gross per 24 hour   Intake 2828.38 ml   Output 1860 ml   Net 968.38 ml       Laboratory  Recent Labs     11/23/20  0316 11/24/20  0252 11/25/20  0143   ISTATAPH 7.227* 7.440 7.427   ISTATAPCO2 71.6* 49.3* 51.3*   ISTATAPO2 95* 70 77   ISTATATCO2 32 35* 35*   [id number] 95 94 95   ISTATARTHCO3 29.7* 33.5* 33.8*   ISTATARTBE 1 8* 8*   ISTATTEMP 97.6 F 38.2 C 38.3 C   ISTATFIO2 100 90 70   ISTATSPEC Arterial Arterial Arterial   ISTATAPHTC 7.234* 7.422 7.407   [id number] 92* 76 84         Recent Labs     11/23/20 0328 11/24/20 0321 11/25/20  0240   SODIUM 131* 132* 133*   POTASSIUM 3.8 3.1* 3.9   CHLORIDE 92* 93* 94*   CO2 33 34* 34*   BUN 22 29* 23*   CREATININE 0.37* 0.54 0.49*   MAGNESIUM 1.9 2.3  --    PHOSPHORUS  --  2.8 2.6   CALCIUM 8.6 9.1 8.9     Recent Labs     11/23/20 0328 11/23/20  1905 11/24/20 0321 11/25/20  0240   ALTSGPT 65*  --   --   --    ASTSGOT 35  --   --   --    ALKPHOSPHAT 77  --   --   --    TBILIRUBIN 0.8  --   --   --    PREALBUMIN   --  17.6*  --   --    GLUCOSE 146*  --  143* 152*     Recent Labs     11/23/20 0328 11/24/20 0321 11/25/20  0240   WBC 16.0* 21.2* 16.3*   NEUTSPOLYS 89.60* 90.10* 89.60*   LYMPHOCYTES 5.40* 4.90* 5.80*   MONOCYTES 3.00 2.90 2.30   EOSINOPHILS 0.20 0.40 1.00   BASOPHILS 0.20 0.30 0.40   ASTSGOT 35  --   --    ALTSGPT 65*  --   --    ALKPHOSPHAT 77  --   --    TBILIRUBIN 0.8  --   --      Recent Labs     11/23/20 0328 11/24/20 0321 11/25/20  0240   RBC 4.33* 4.22* 3.75*   HEMOGLOBIN 13.0* 12.6* 11.4*   HEMATOCRIT 39.8* 38.6* 34.9*   PLATELETCT 242 281 238       Imaging  Chest x-ray personally reviewed and compared to prior film showing slight improvement in diffuse bilateral infiltrates, endotracheal tube/gastric tube/left IJ central venous catheter in good position    Assessment/Plan  * Acute respiratory failure with hypoxia (HCC)- (present on admission)  Assessment & Plan  Intubated on 11/13  Continue full vent support, continue to wean APRV with drop and stretch technique  Titrate FiO2 to goal SaO2 greater than 85%  RT/O2 protocol  Continue to limit sedatives  Hold SBT/mobilization given severity of illness today  Resume Lasix    Bacterial pneumonia  Assessment & Plan  Likely superinfection, 11/23  Continue cefepime x7 days, discontinue vancomycin  Follow-up on final cultures    Shock (HCC)  Assessment & Plan  Continue to titrate norepinephrine to maintain MAP greater than 60  Begin oral midodrine  Monitor blood pressure closely  Resume Lasix monitoring fluid status closely    Acute respiratory distress syndrome (ARDS) due to COVID-19 virus ([facility])  Assessment & Plan  Continue lung protective ventilation strategies  Monitor daily P:F and compliance  Resume forced diuresis today and monitor kidney function closely  Limit sedatives and allow spontaneous breathing as much as possible    COVID-19 virus infection- (present on admission)  Assessment & Plan  Continue with the following therapies while monitoring  closely:  Decadron: Completed 10-day course 11/21  Remdesivir: Completed 5-day course  Diuresis: Resume Lasix today  Antitussives, supportive care, monitoring inflammatory markers and LFTs  Maintain strict isolation precautions  S/p 72 hours of proning protocols twice    Acute renal failure with tubular necrosis (HCC)  Assessment & Plan  Improving  Avoid nephrotoxins  Monitor creatinine, urine output, electrolytes closely  Resume diuresis    Slow transit constipation  Assessment & Plan  Continue aggressive bowel protocol,  Enema, trial of Relistor  Aspiration precautions    Transaminitis- (present on admission)  Assessment & Plan  Likely 2/2 COVID-19  Avoid hepatotoxins  Monitor synthetic function    Hyponatremia  Assessment & Plan  Improving again today  Monitor with daily serum sodium level    Hypomagnesemia  Assessment & Plan  Repleted    Hypokalemia- (present on admission)  Assessment & Plan  Replete daily and monitor closely       VTE:  Lovenox  Ulcer: H2 Antagonist  Lines: Central Line  Ongoing indication addressed and Sawyer Catheter  Ongoing indication addressed    I have performed a physical exam and reviewed and updated ROS and Plan today (11/25/2020). In review of yesterday's note (11/24/2020), there are no changes except as documented above.     Patient remains critically ill today requiring active management of his mechanical ventilatory support as well as titration of vasopressor for persistent shock. High risk of deterioration and worsening vital organ dysfunction and death without the above critical care interventions.    Discussed patient condition and risk of morbidity and/or mortality with RN, RT, Pharmacy, Charge nurse / hot rounds and Patient.  Critical care time = 38 minutes in directly providing and coordinating critical care and extensive data review.  No time overlap and excludes procedures.

## 2020-11-25 NOTE — CARE PLAN
Problem: Respiratory:  Goal: Respiratory status will improve  Outcome: PROGRESSING SLOWER THAN EXPECTED  Interventions: RN and RT agree with plan of care, continuously monitoring respiratory status, VAP bundle in place, sedation weaned as tolerated.      Problem: Safety  Goal: Will remain free from injury, Will remain free from falls  Outcome: PROGRESSING AS EXPECTED  Interventions: patient oriented to room and surroundings, plan of care verbalized, patient unable to verbalize understanding, hourly rounds in place.

## 2020-11-26 NOTE — CARE PLAN
Vent Day # 14       Ventilator settings changed this shift: Thigh 9, Tlow .6, Phigh 18, Plow 0  Pt remains on 60%     Weaning trials: not at this time     Respiratory Procedures:      Plan: Continue current ventilator settings and wean mechanical ventilation as tolerated per physician orders.

## 2020-11-26 NOTE — CARE PLAN
Problem: Skin Integrity  Goal: Risk for impaired skin integrity will decrease  Outcome: PROGRESSING AS EXPECTED  Interventions: q2 turns, patient on inflated waffle mattress, z-flow pillow in place, pillows in use for support, skin assessed under devices.     Problem: Psychosocial Needs:  Goal: Level of anxiety will decrease  Outcome: PROGRESSING AS EXPECTED  Interventions: patient Danish speaking, plan of care explained, patient unable to verbalize understanding, nods head appropriately, care explained to patient beforehand.

## 2020-11-26 NOTE — PROGRESS NOTES
Critical Care Progress Note    Date of admission  11/12/2020    Chief Complaint  55 y.o. male admitted 11/12/2020 with COVID pneumonia and acute hypoxic respiratory failure.    Hospital Course  Mr. Costa is a 55 year old male with no past medical history who tested positive for COVID on 11/6/2020 and has progressive worsening of shortness of breath and now admitted to the ICU for acute hypoxic respiratory failure due to COVID pneumonia.  11/13 - remains on max HFNC with NRBM, remdesivir/decadron, starting lasix, intubated and proned/paralyzed  11/14 - full vent support, 16/70%, diuresing well with lasix, P/F ratio 178, compliance 35, cont proning protocol  11/15 - full vent support, 16/60%, diuresing well, P/F ratio 180, compliance 38, cont proning protocol tonight   11/16 - full vent support, 16/50%, diuresing well, P/F ratio at 210 with compliance of 38, hold proning tonight, stop paralytics  11/17 - full vent support, did not tolerate being off paralytics and holding proning-->p/f ratio 86, compliance of 39, PEEP 14 with FiO2 of 100%, proned/paralyzed  11/18 - full vent support, 14/70%, paralyzed/proned, p/f 111, compliance 44, stopped propofol and started versed  11/19 - full vent support, 14/60%, paralyzed/proned, p/f 137, compliance 47, versed  11/20 - full vent support, 14/60%, hold proning, continue paralytics, p/f 153, compliance 27  11/21 - full vent support, 12/100%, stop paralytics, cont heavy sedation, P/F 87, Compliance 27  11/23 -full vent support, sedation weaning, remains febrile  11/24 -awake and following, ongoing Vanco and cefepime, Levophed, full vent support  11/25 -full vent support on APRV, back on Levophed    Interval Problem Update  Reviewed last 24 hour events:   - Tm 39.5, nl WBC   - sinus tach   - SBP  (mild drop this morning), levophed @ 15   - anxiety, started on versed pushes   - follows commands   - fent 200, prop 20   - low K   - improving creatinine   - resume TFs   - Na  down to 132   - cefepime day 4/7, cultures neg   - lasix 20 BID   - midodrine 5 --> increased to 15    Review of Systems  Review of Systems   Unable to perform ROS: Intubated        Vital Signs for last 24 hours   Temp:  [38 °C (100.4 °F)] 38 °C (100.4 °F)  Pulse:  [] 99  Resp:  [17-42] 22  BP: ()/(46-96) 96/51  SpO2:  [84 %-96 %] 96 %    Respiratory Information for the last 24 hours  Vent Mode: APVCMV  Rate (breaths/min): 32  Vt Target (mL): 410  PEEP/CPAP: 16  MAP: 19  Control VTE (exp VT): 705    Physical Exam   Physical Exam  Vitals signs and nursing note reviewed.   Constitutional:       General: He is awake.      Appearance: He is overweight. He is ill-appearing. He is not diaphoretic.      Interventions: He is intubated and restrained.      Comments: Persistently tachycardic and tachypneic   HENT:      Head: Normocephalic.      Nose: Nose normal.      Comments: Nasal feeding tube in place     Mouth/Throat:      Mouth: Mucous membranes are moist.      Pharynx: Oropharynx is clear.      Comments: Endotracheal tube in place  Eyes:      Conjunctiva/sclera: Conjunctivae normal.      Pupils: Pupils are equal, round, and reactive to light.   Neck:      Musculoskeletal: Neck supple.      Comments: Left IJ central venous catheter without surrounding hematoma  Cardiovascular:      Rate and Rhythm: Regular rhythm. Tachycardia present. Occasional extrasystoles are present.     Chest Wall: PMI is not displaced.      Pulses: Decreased pulses.           Radial pulses are 1+ on the right side and 1+ on the left side.      Heart sounds: Heart sounds are distant. No murmur.      Comments: Needing ongoing Levophed today  Pulmonary:      Effort: Tachypnea and respiratory distress present. No accessory muscle usage. He is intubated.      Breath sounds: Examination of the right-middle field reveals rhonchi. Examination of the left-middle field reveals rhonchi. Examination of the right-lower field reveals rhonchi and  rales. Examination of the left-lower field reveals rhonchi and rales. Rhonchi and rales present. No decreased breath sounds.      Comments: Plateau pressures remain 24, minimal secretions  Abdominal:      General: Bowel sounds are normal. There is no distension.      Palpations: Abdomen is soft.      Tenderness: There is no abdominal tenderness. There is no rebound.   Genitourinary:     Comments: Sawyer catheter in place  Musculoskeletal:         General: No tenderness.      Right lower leg: Edema present.      Left lower leg: Edema present.   Skin:     General: Skin is warm and dry.      Capillary Refill: Capillary refill takes 2 to 3 seconds.      Coloration: Skin is not jaundiced.   Neurological:      General: No focal deficit present.      Mental Status: He is alert.      Cranial Nerves: No cranial nerve deficit.      Sensory: No sensory deficit.      Motor: Weakness (Diffuse, generalized) present.      Comments: Nods appropriately, anxious, diffuse weakness but able to move all extremities   Psychiatric:         Behavior: Behavior is cooperative.      Comments:  unable to assess given current clinical condition         Medications  Current Facility-Administered Medications   Medication Dose Route Frequency Provider Last Rate Last Admin   • furosemide (LASIX) injection 20 mg  20 mg Intravenous Q12HRS Jeremy M Gonda, M.D.   20 mg at 11/26/20 0514   • QUEtiapine (Seroquel) tablet 100 mg  100 mg Enteral Tube Q8HRS Jeremy M Gonda, M.D.   100 mg at 11/26/20 0514   • midodrine (PROAMATINE) tablet 5 mg  5 mg Enteral Tube TID WITH MEALS Jeremy M Gonda, M.D.   5 mg at 11/26/20 0739   • lactulose 20 GM/30ML solution 30 mL  30 mL Oral TID PRN Jeremy M Gonda, M.D.       • midazolam (VERSED) injection 2-5 mg  2-5 mg Intravenous Q HOUR PRN Rai Yusuf M.D.   3 mg at 11/26/20 0601   • acetaminophen (Tylenol) tablet 650 mg  650 mg Enteral Tube Q6HRS Jeremy M Gonda, M.D.   650 mg at 11/26/20 0514   • cefepime (MAXIPIME) 2 g  in  mL IVPB  2 g Intravenous Q12HRS Jeremy M Gonda, M.D.   Stopped at 11/26/20 0544   • oxyCODONE immediate-release (ROXICODONE) tablet 5-10 mg  5-10 mg Enteral Tube Q4HRS PRN Jeremy M Gonda, M.D.   10 mg at 11/23/20 1128   • fentaNYL (SUBLIMAZE) injection 100 mcg  100 mcg Intravenous Q15 MIN PRN Jeremy M Gonda, M.D.   100 mcg at 11/25/20 2213    And   • fentaNYL (SUBLIMAZE) injection 200 mcg  200 mcg Intravenous Q15 MIN PRN Jeremy M Gonda, M.D.   200 mcg at 11/24/20 2015    And   • fentaNYL (SUBLIMAZE) 50 mcg/mL in 50mL (Continuous Infusion)   Intravenous Continuous Jeremy M Gonda, M.D. 7 mL/hr at 11/26/20 0506 350 mcg/hr at 11/26/20 0506   • propofol (DIPRIVAN) injection  0-80 mcg/kg/min Intravenous Continuous Jeremy M Gonda, M.D. 34.7 mL/hr at 11/26/20 0448 60 mcg/kg/min at 11/26/20 0448   • norepinephrine (Levophed) infusion 8 mg/250 mL (premix)  0-30 mcg/min Intravenous Continuous Patti Park M.D. 37.5 mL/hr at 11/26/20 0740 20 mcg/min at 11/26/20 0740   • promethazine (PHENERGAN) tablet 12.5-25 mg  12.5-25 mg Enteral Tube Q4HRS PRN Patti Park M.D.       • Respiratory Therapy Consult   Nebulization Continuous RT Mahsa Reis M.D.       • ipratropium-albuterol (DUONEB) nebulizer solution  3 mL Nebulization Q2HRS PRN (RT) Mahsa Reis M.D.       • famotidine (PEPCID) tablet 20 mg  20 mg Enteral Tube Q12HRS Mahsa Reis M.D.   20 mg at 11/26/20 0514    Or   • famotidine (PEPCID) injection 20 mg  20 mg Intravenous Q12HRS Mahsa Reis M.D.   20 mg at 11/24/20 1719   • Pharmacy Consult: Enteral tube insertion - review meds/change route/product selection  1 Each Other PHARMACY TO DOSE Mahsa Reis M.D.       • senna-docusate (PERICOLACE or SENOKOT S) 8.6-50 MG per tablet 2 Tab  2 Tab Enteral Tube BID Mahsa Reis M.D.   2 Tab at 11/26/20 0514    And   • polyethylene glycol/lytes (MIRALAX) PACKET 1 Packet  1 Packet Enteral Tube QDAY PRN Mahsa Reis M.D.   1 Packet at 11/21/20  0638    And   • magnesium hydroxide (MILK OF MAGNESIA) suspension 30 mL  30 mL Enteral Tube QDAY PRN Mahsa Reis M.D.   30 mL at 11/19/20 1738    And   • bisacodyl (DULCOLAX) suppository 10 mg  10 mg Rectal QDAY PRN Mahsa Reis M.D.   10 mg at 11/24/20 2324   • ondansetron (ZOFRAN ODT) dispertab 4 mg  4 mg Enteral Tube Q6HRS PRN Mahsa Reis M.D.       • promethazine (PHENERGAN) suppository 12.5-25 mg  12.5-25 mg Rectal Q4HRS PRN Pablito Mills Jr., D.O.       • prochlorperazine (COMPAZINE) injection 5-10 mg  5-10 mg Intravenous Q4HRS PRN Pablito Mills Jr., D.O.       • ondansetron (ZOFRAN) syringe/vial injection 4 mg  4 mg Intravenous Q6HRS PRN Pablito Mills Jr., D.O.       • enoxaparin (LOVENOX) inj 40 mg  40 mg Subcutaneous DAILY Pablito Mills Jr., D.O.   40 mg at 11/26/20 0514   • MD Alert...ICU Electrolyte Replacement per Pharmacy   Other PHARMACY TO DOSE Adalberto Galeana M.D.           Fluids    Intake/Output Summary (Last 24 hours) at 11/26/2020 0745  Last data filed at 11/26/2020 0600  Gross per 24 hour   Intake 1450.59 ml   Output 2760 ml   Net -1309.41 ml       Laboratory  Recent Labs     11/24/20  0252 11/25/20  0143 11/26/20  0428   ISTATAPH 7.440 7.427 7.527*   ISTATAPCO2 49.3* 51.3* 37.8*   ISTATAPO2 70 77 42*   ISTATATCO2 35* 35* 32   [id number] 94 95 83*   ISTATARTHCO3 33.5* 33.8* 31.3*   ISTATARTBE 8* 8* 8*   ISTATTEMP 38.2 C 38.3 C 96.6 F   ISTATFIO2 90 70 60   ISTATSPEC Arterial Arterial Arterial   ISTATAPHTC 7.422 7.407 7.544*   [id number] 76 84 39*         Recent Labs     11/24/20 0321 11/25/20 0240 11/26/20  0510   SODIUM 132* 133* 132*   POTASSIUM 3.1* 3.9 3.2*   CHLORIDE 93* 94* 93*   CO2 34* 34* 29   BUN 29* 23* 12   CREATININE 0.54 0.49* 0.32*   MAGNESIUM 2.3  --   --    PHOSPHORUS 2.8 2.6 2.7   CALCIUM 9.1 8.9 8.6     Recent Labs     11/23/20  1905 11/24/20 0321 11/25/20  0240 11/26/20  0510   PREALBUMIN 17.6*  --   --   --    GLUCOSE  --  143* 152* 93     Recent  Labs     11/24/20  0321 11/25/20  0240 11/26/20  0510   WBC 21.2* 16.3* 16.4*   NEUTSPOLYS 90.10* 89.60* 87.80*   LYMPHOCYTES 4.90* 5.80* 2.60*   MONOCYTES 2.90 2.30 0.90   EOSINOPHILS 0.40 1.00 1.70   BASOPHILS 0.30 0.40 0.00     Recent Labs     11/24/20  0321 11/25/20  0240 11/26/20  0510   RBC 4.22* 3.75* 4.12*   HEMOGLOBIN 12.6* 11.4* 12.5*   HEMATOCRIT 38.6* 34.9* 38.4*   PLATELETCT 281 238 260       Imaging  Chest x-ray personally reviewed and compared to prior film showing unchanged diffuse bilateral infiltrates with enlarged cardiac silhouette, endotracheal tube/gastric tube/left IJ central venous catheter in good position    Assessment/Plan  * Acute respiratory failure with hypoxia (HCC)- (present on admission)  Assessment & Plan  Intubated on 11/13  Continue full vent support, continue to wean APRV with drop and stretch technique today  Titrate FiO2 to goal SaO2 greater than 85%  RT/O2 protocol  Tolerate tachypnea and tachycardia  Continue to limit sedatives  Continue Lasix  Mobilize  As needed anxiolytics    Bacterial pneumonia  Assessment & Plan  Likely superinfection, 11/23  Continue cefepime x7 days  Follow-up on final cultures    Shock (HCC)  Assessment & Plan  Continue to titrate norepinephrine to maintain MAP greater than 60  Increase dose of oral midodrine  Monitor blood pressure closely  Continue Lasix  Consider relative adrenal insufficiency given recent Decadron use, check cortisol in a.m.    Acute respiratory distress syndrome (ARDS) due to COVID-19 virus ([facility])  Assessment & Plan  Continue lung protective ventilation strategies  Monitor daily P:F and compliance  Continue forced diuresis   Limit sedatives and allow spontaneous breathing as much as possible    COVID-19 virus infection- (present on admission)  Assessment & Plan  Continue with the following therapies while monitoring closely:  Decadron: Completed 10-day course 11/21  Remdesivir: Completed 5-day course  Diuresis: Lasix twice  daily  Antitussives, supportive care, monitoring inflammatory markers and LFTs  Maintain strict isolation precautions  S/p 72 hours of proning protocols twice  As needed anxiolytic    Acute renal failure with tubular necrosis (HCC)  Assessment & Plan  Improving again today  Avoid nephrotoxins  Monitor creatinine, urine output, electrolytes closely  Continue diuresis    Slow transit constipation  Assessment & Plan  Improved after Relistor and bowel protocol  Continue aggressive bowel protocol  Aspiration precautions    Transaminitis- (present on admission)  Assessment & Plan  Likely 2/2 COVID-19  Avoid hepatotoxins  Monitor synthetic function    Hyponatremia  Assessment & Plan  Worsening  Monitor with daily serum sodium level    Hypomagnesemia  Assessment & Plan  Repleted    Hypokalemia- (present on admission)  Assessment & Plan  Replete again today and monitor       VTE:  Lovenox  Ulcer: H2 Antagonist  Lines: Central Line  Ongoing indication addressed and Sawyer Catheter  Ongoing indication addressed    I have performed a physical exam and reviewed and updated ROS and Plan today (11/26/2020). In review of yesterday's note (11/25/2020), there are no changes except as documented above.     Patient is critically ill today requiring active management of his mechanical ventilatory support as well as titration of vasopressor for persistent shock. High risk of deterioration and worsening vital organ dysfunction and death without the above critical care interventions.    Discussed patient condition and risk of morbidity and/or mortality with RN, RT, Pharmacy, Charge nurse / hot rounds and Patient.  Critical care time = 34 minutes in directly providing and coordinating critical care and extensive data review.  No time overlap and excludes procedures.

## 2020-11-27 PROBLEM — I48.91 ATRIAL FIBRILLATION WITH RVR (HCC): Status: ACTIVE | Noted: 2020-01-01

## 2020-11-27 NOTE — DIETARY
"Nutrition support weekly update:  Day 15 of admit.  Iain Costa is a 55 y.o. male with admitting DX of acute respiratory failure with hypoxia.  Tube feeding initiated on . Current TF via gastric Cortrak is Peptamen Intense VHP @ 45 mL/hr (rate with Propofol), providing 1080 kcal (+kcal from Propofol), 99 gm protein, 82 gm CHO, and 907 mL of free water per day.     Assessment:  Weight today is 97.1 kg via bed scale, which is down 4.4 kg (~10 lbs) from previous weeks wt of 101.5 kg via bed scale taken on .  Suspect r/t fluid status (-1.2L) and pt has been receiving hypocaloric feeds.      Estimated Nutritional Needs based on:   Height: 172.7 cm (5' 7.99\")  Weight: 97.1 kg (214 lb 1.1 oz)  Weight to Use in Calculations: 97.1 kg (214 lb 1.1 oz)  Ideal Body Weight: 69.9 kg (154 lb)    Calculation/Equation: MSJ x 1 = 1777 kcal.  PSU = 2182 kcal (65-70%: 1418 - 1527) (VE: 11.1, Tmax over the past 24 hours: 38.0).   Total Calories / day: 1068 - 1359 (Calories / k - 14 actual wt)  Total Grams Protein / day: 140 (Grams Protein / k.0 IBW)    Evaluation:   1. Pt has been tolerating TF @ goal with Propofol.   2. Vent day 15.   3. No skin breakdown or edema noted @ this time.    4. Current clinical picture and MD progress notes reviewed.  5. Labs: Sodium: 132, Glucose: 130, Creat.: 0.41.   6. Meds: Maxipime, Pepcid, Lasix, Electrolyte replacement, Midodrine, +bowel regimen (noted w/ slow transit constipation), Amiodarone, Fentanyl.  7. Levophed currently stopped per MAR.  +RAYO @ 130 mcg/min.  Propofol @ 60 mcg/kg/min (34.7 mL/hr), providing an additional 916 kcal.  8. LBM:  - continue aggressive bowel reg.   9. Current feeding remains appropriate at this time.     Malnutrition risk: No additional criteria at this time. Remains at risk 2/2 critical illness.     Recommendations/Plan:  1. Continue current TF formula and goal rates with and without Propofol.   2. Fluids per MD.  3. Continue bowel " regimen.  4. Continue to monitor wt.  5. RD to monitor labs and Propofol infusions daily and adjust TF regimen prn.     RD follows.

## 2020-11-27 NOTE — PROGRESS NOTES
0759: Pt noted -170bpm, SBP 70s. Norepinephrine drip to 30mcg/min per order.  Dr. Gonda with intensivist team notified and called to bedside.  Pt synchronized cardioverted at 120J.      0800- 150 amio bolus IV push and Amiodarone drip started, see MAR.     0802- Neosynephrine push and drip started.      0804- Pt's HR remains 140-170 bpm, 150J used to synchronize cardiovert.     0806- 150mg amiodarone IV bolus.     0807- 1g IV magnesium administered    0808- 1g IV magnesium administered    0814- Vecuronium IV administered, see MAR.     0819- PT's HR remains 140-170bpm, 200J used to synchronize cardiovert; 500Ml NS bolus IV administered; 500mcg of digoxin IV given, see MAR.     0845- Pt's HR remains 140-170bpm, irregular, Neosynephrine drip at 50mcg/min, SBP .  Dr. Gonda notified and states to continue titrating vasopressor therapy as indicated.

## 2020-11-27 NOTE — PROCEDURES
Procedure Note    Date: 11/27/2020  Time: 0830    Procedure: Emergent electrical cardioversion, procedural sedation    Indication: Unstable narrow complex tachyarrhythmia with associated hypotension  Consent: Emergent/implied    Procedure: Pads were placed on patient's chest in the anterior-posterior location. All back-up airway, suction, and code cart supplies ready at bedside. Patient provided conscious sedation with medications reaching a moderate sedation level. At that point, in synchronized fashion, 100 J of electricity delivered with 2 to 3 seconds of conversion to sinus tachycardia.  Required additional attempts at 150 J and 200 J several minutes apart again with very transient conversion to sinus tachycardia before going back into atrial fibrillation with rapid ventricular response. Patient left in care of RN and RT without complications.    Start time for procedural sedation:0810  End time for procedural sedation: 0828  Total time:18    Medications: Propofol, fentanyl drips, amiodarone 150 mg IV bolus x2 followed by drip at 1 mg/min, digoxin 500 mcg IV x1, magnesium 2 g IV x1  Complications: none    Jeremy Gonda, MD  Critical Care Medicine

## 2020-11-27 NOTE — PROGRESS NOTES
Spiritual Care Note    Patient Information     Patient's Name: Iain Costa   MRN: [id number]    YOB: 1965   Age and Gender: 55 y.o. male   Service Area: [facility]   Room (and Bed): [place] [address]   Ethnicity or Nationality:     Primary Language: R[place]an   Faith/Spiritual preference: Church   Place of Residence: [place]   Family/Friends/Others Present: Daughters, SO, Many others 10+   Clinical Team Present: RN   Medical Diagnosis(-es)/Procedure(s):  Acute respiratory failure with hypoxia (HCC)  COVID-19 virus infection  Acute respiratory distress syndrome (ARDS) due to COVID-19 virus (HCC)  Shock (HCC)  Bacterial pneumonia  Atrial fibrillation with RVR (HCC)  Transaminitis  Slow transit constipation  Acute renal failure with tubular necrosis (HCC)  Hypokalemia  Hypomagnesemia  Hyponatremia   Code Status: Full Code    Date of Admission: 11/12/2020   Length of Stay: 15 days        Spiritual Care Provider Information:  Name of Spiritual Care Provider: Camille Ceja  Title of Spiritual Care Provider: Associate   Phone Number: [phone]  E-mail: [email or web address]  Total time : 45 minutes    Spiritual Screen Results:    Gen Nursing  Spiritual Screen  Was spiritual care education provided to the patient?: Yes     Palliative Care  PC Faith/Spiritual Screening  Is your spiritual health or inner well-being important to you as you cope with your medical condition?: Yes  Would you like to receive a visit from our Spiritual Care team or your own [employer] or spiritual leader?: Yes  Was spiritual care education provided to the patient?: Yes  PC Sprituality screening comment: Church      Encounter/Request Information  Encounter/Request Type   Visited With: Patient and family together, Health care provider  Nature of the Visit: Initial, On shift  Crisis Visit: Critical care  Referral From/ Origin of Request: Verbal staff, Phone  Referral To: Community clergy(REFERRED  TO FR. DELA CRUZ FOR ZOOM TODAY.)    Religous Needs/Values  Congregational Needs Visit  Congregational Needs: Prayer  Ritual Needs Visit  Ritual Needs: Anointing    Spiritual Assessment   Spiritual Care Encounters    Observations/Symptoms: Accepting, Sadness, Tearful, Thankfulness(Pt intubated, sedated.  Family above symptoms)    Interacton/Conversation:  responded to request by PC SACHIN Shrestha and BS SACHIN Bragg for  per family request.   facilitated zoom call with family with assistance of BS RN.   Fr. Dela Cruz contacted and offered Last Rites for pt during family zoom call.  Family grateful for time with pt and prayers of .    Assessment: Need, Distress    Need: Seeking Spiritual Assistance and Support, Decision Making    Distress: Grief/Loss/Bereavement    Interventions: Compassionate Presence, ZOOM facilitation,  Prayer    Outcomes: Ability to Communicate with Truth and Honesty, Emotional Release, Love/Belonging/ Compassion/Kindness/Acceptance, Spiritual Comfort, Value/Dignity/Respect    Plan: Visit Upon Request

## 2020-11-27 NOTE — PALLIATIVE CARE
Palliative Care follow-up  1315: PC RN discussed case with RN noting family's desire for a Zoom meeting with a . Per RN, MD has provided updates on the gravity of his condition with a plan to f/u Sunday if he is not improving clinically. PC reached out to chaplain Obrien who is coordinating the  visit with the family.  PC to f/u with family after the visit to answer any further questions the family may have about the situation and provide support.    1505: Per Chaplain Obrien, Zoom meeting to be held soon and PC can reach out Traci after.     Updated: MD/RN    Plan: f/u with Esteban following  visit    Thank you for allowing Palliative Care to support this patient and family. Contact [phone] for additional assistance, change in patient status, or with any questions/concerns.

## 2020-11-27 NOTE — PROGRESS NOTES
Patient received 11/26/2020 @ 1900 in bilateral soft wrist restraints.  Assessed q2h with no complications noted

## 2020-11-27 NOTE — ASSESSMENT & PLAN NOTE
New onset with associated shock 11/27 --> converted back to normal sinus rhythm 11/28  S/p unsuccessful electrical cardioversion x3  S/p IV digoxin load  S/p IV amiodarone boluses followed by drip --> transition drip to oral today  Optimize electrolytes  Continue therapeutic anticoagulation

## 2020-11-27 NOTE — PROGRESS NOTES
Critical Care Progress Note    Date of admission  11/12/2020    Chief Complaint  55 y.o. male admitted 11/12/2020 with COVID pneumonia and acute hypoxic respiratory failure.    Hospital Course  Mr. Costa is a 55 year old male with no past medical history who tested positive for COVID on 11/6/2020 and has progressive worsening of shortness of breath and now admitted to the ICU for acute hypoxic respiratory failure due to COVID pneumonia.  11/13 - remains on max HFNC with NRBM, remdesivir/decadron, starting lasix, intubated and proned/paralyzed  11/14 - full vent support, 16/70%, diuresing well with lasix, P/F ratio 178, compliance 35, cont proning protocol  11/15 - full vent support, 16/60%, diuresing well, P/F ratio 180, compliance 38, cont proning protocol tonight   11/16 - full vent support, 16/50%, diuresing well, P/F ratio at 210 with compliance of 38, hold proning tonight, stop paralytics  11/17 - full vent support, did not tolerate being off paralytics and holding proning-->p/f ratio 86, compliance of 39, PEEP 14 with FiO2 of 100%, proned/paralyzed  11/18 - full vent support, 14/70%, paralyzed/proned, p/f 111, compliance 44, stopped propofol and started versed  11/19 - full vent support, 14/60%, paralyzed/proned, p/f 137, compliance 47, versed  11/20 - full vent support, 14/60%, hold proning, continue paralytics, p/f 153, compliance 27  11/21 - full vent support, 12/100%, stop paralytics, cont heavy sedation, P/F 87, Compliance 27  11/23 -full vent support, sedation weaning, remains febrile  11/24 -awake and following, ongoing Vanco and cefepime, Levophed, full vent support  11/25 -full vent support on APRV, back on Levophed  11/27 -increasing need for full vent support, escalating vasopressors, recurrent A. fib with RVR    Interval Problem Update  Reviewed last 24 hour events:   - worsening oxygenation overnight   - levophed @ 20   - Afib with RVR this morning with hypotension --> electrical cardioversion x  3, amio bolus and gtt, digoxin, levo changed to dilan gtt @ 100   - more tachypneic and hypoxic this morning --> paralyzed   - fent 200, prop 60   - Tm 38.1, decreasing WBC   - moisés Tfs at goal, last BM 11/25   - good UOP   - day 5/7 cefepime   - BUN/crt unchanged on lasix (I/O -1.2L)   - midodrine 15 mg TID   - glucose well controlled   - cortisol 27.5    Review of Systems  Review of Systems   Unable to perform ROS: Intubated        Vital Signs for last 24 hours   Temp:  [37 °C (98.6 °F)-38.1 °C (100.5 °F)] 37 °C (98.6 °F)  Pulse:  [] 112  Resp:  [23-51] 30  BP: ()/(41-90) 88/54  SpO2:  [77 %-94 %] 92 %    Respiratory Information for the last 24 hours  Vent Mode: APVCMV  MAP: 17  Control VTE (exp VT): 483 PH 18, PL 0, TH 9, TL 0.6, FiO2 100%    Physical Exam   Physical Exam  Vitals signs and nursing note reviewed.   Constitutional:       General: He is in acute distress.      Appearance: He is overweight. He is ill-appearing.      Interventions: He is sedated, intubated and restrained.      Comments: Much worse appearing today   HENT:      Head: Normocephalic and atraumatic.      Right Ear: External ear normal.      Left Ear: External ear normal.      Nose: Nose normal. No congestion.      Comments: Nasal feeding tube in place     Mouth/Throat:      Mouth: Mucous membranes are moist.      Pharynx: Oropharynx is clear.      Comments: Endotracheal tube and bite block in place  Eyes:      General: No scleral icterus.     Conjunctiva/sclera: Conjunctivae normal.      Pupils: Pupils are equal, round, and reactive to light.   Neck:      Musculoskeletal: Neck supple. No neck rigidity.      Comments: Left IJ central venous catheter without surrounding hematoma  Cardiovascular:      Rate and Rhythm: Tachycardia present. Rhythm irregularly irregular. Frequent extrasystoles are present.     Chest Wall: PMI is not displaced.      Pulses: Decreased pulses.           Radial pulses are 1+ on the right side and 1+ on the  left side.      Heart sounds: Heart sounds are distant. No murmur.      Comments: Thready pulses this morning despite increasing norepinephrine drip, in A. fib with RVR  Pulmonary:      Effort: Tachypnea, accessory muscle usage and respiratory distress present. He is intubated.      Breath sounds: Examination of the right-upper field reveals rhonchi. Examination of the left-upper field reveals rhonchi. Examination of the right-middle field reveals rhonchi. Examination of the left-middle field reveals rhonchi. Examination of the right-lower field reveals rhonchi and rales. Examination of the left-lower field reveals rhonchi and rales. Rhonchi and rales present. No wheezing.      Comments: Plateau pressures remain 28 on APRV, minimal secretions  Abdominal:      General: Bowel sounds are normal. There is no distension.      Palpations: Abdomen is soft.      Tenderness: There is no abdominal tenderness. There is no guarding.   Genitourinary:     Comments: Sawyer catheter in place  Musculoskeletal:         General: No tenderness.      Right lower leg: Edema present.      Left lower leg: Edema present.   Skin:     General: Skin is warm and dry.      Capillary Refill: Capillary refill takes more than 3 seconds.      Coloration: Skin is pale.   Neurological:      Mental Status: He is lethargic.      Motor: Weakness (Diffuse, generalized) present.      Comments: Heavily sedated this morning, lethargic, withdraws to pain minimally   Psychiatric:      Comments:  unable to assess given current clinical condition         Medications  Current Facility-Administered Medications   Medication Dose Route Frequency Provider Last Rate Last Admin   • ALPRAZolam (XANAX) tablet 0.5 mg  0.5 mg Enteral Tube TID Jeremy M Gonda, M.D.   0.5 mg at 11/27/20 0423   • midodrine (PROAMATINE) tablet 15 mg  15 mg Enteral Tube TID WITH MEALS Jeremy M Gonda, M.D.   15 mg at 11/26/20 8007   • lactulose 20 GM/30ML solution 30 mL  30 mL Enteral Tube TID PRN  Jeremy M Gonda, M.D.       • furosemide (LASIX) injection 20 mg  20 mg Intravenous Q12HRS Jeremy M Gonda, M.D.   20 mg at 11/27/20 0424   • QUEtiapine (Seroquel) tablet 100 mg  100 mg Enteral Tube Q8HRS Jeremy M Gonda, M.D.   100 mg at 11/27/20 0423   • acetaminophen (Tylenol) tablet 650 mg  650 mg Enteral Tube Q6HRS Jeremy M Gonda, M.D.   650 mg at 11/27/20 0423   • cefepime (MAXIPIME) 2 g in  mL IVPB  2 g Intravenous Q12HRS Jeremy M Gonda, M.D.   Stopped at 11/27/20 0452   • oxyCODONE immediate-release (ROXICODONE) tablet 5-10 mg  5-10 mg Enteral Tube Q4HRS PRN Jeremy M Gonda, M.D.   10 mg at 11/23/20 1128   • fentaNYL (SUBLIMAZE) injection 100 mcg  100 mcg Intravenous Q15 MIN PRN Jeremy M Gonda, M.D.   100 mcg at 11/26/20 2304    And   • fentaNYL (SUBLIMAZE) injection 200 mcg  200 mcg Intravenous Q15 MIN PRN Jeremy M Gonda, M.D.   200 mcg at 11/24/20 2015    And   • fentaNYL (SUBLIMAZE) 50 mcg/mL in 50mL (Continuous Infusion)   Intravenous Continuous Jeremy M Gonda, M.D. 4 mL/hr at 11/27/20 0709 200 mcg/hr at 11/27/20 0709   • propofol (DIPRIVAN) injection  0-80 mcg/kg/min Intravenous Continuous Jeremy M Gonda, M.D. 34.7 mL/hr at 11/27/20 0422 60 mcg/kg/min at 11/27/20 0422   • norepinephrine (Levophed) infusion 8 mg/250 mL (premix)  0-30 mcg/min Intravenous Continuous Patti Park M.D. 18.8 mL/hr at 11/27/20 0700 10 mcg/min at 11/27/20 0700   • promethazine (PHENERGAN) tablet 12.5-25 mg  12.5-25 mg Enteral Tube Q4HRS PRN Patti Park M.D.       • Respiratory Therapy Consult   Nebulization Continuous RT Mahsa Reis M.D.       • ipratropium-albuterol (DUONEB) nebulizer solution  3 mL Nebulization Q2HRS PRN (RT) Mahsa Reis M.D.       • famotidine (PEPCID) tablet 20 mg  20 mg Enteral Tube Q12HRS Mahsa Reis M.D.   20 mg at 11/27/20 0423    Or   • famotidine (PEPCID) injection 20 mg  20 mg Intravenous Q12HRS Mahsa Reis M.D.   20 mg at 11/26/20 1738   • Pharmacy Consult: Enteral tube  insertion - review meds/change route/product selection  1 Each Other PHARMACY TO DOSE Mahsa Reis M.D.       • senna-docusate (PERICOLACE or SENOKOT S) 8.6-50 MG per tablet 2 Tab  2 Tab Enteral Tube BID Mahsa Reis M.D.   2 Tab at 11/27/20 0424    And   • polyethylene glycol/lytes (MIRALAX) PACKET 1 Packet  1 Packet Enteral Tube QDAY PRN Mahsa Reis M.D.   1 Packet at 11/21/20 0638    And   • magnesium hydroxide (MILK OF MAGNESIA) suspension 30 mL  30 mL Enteral Tube QDAY PRN Mahsa Reis M.D.   30 mL at 11/19/20 1738    And   • bisacodyl (DULCOLAX) suppository 10 mg  10 mg Rectal QDAY PRN Mahsa Reis M.D.   10 mg at 11/24/20 2324   • ondansetron (ZOFRAN ODT) dispertab 4 mg  4 mg Enteral Tube Q6HRS PRN Mahsa Reis M.D.       • promethazine (PHENERGAN) suppository 12.5-25 mg  12.5-25 mg Rectal Q4HRS PRN Pablito Mills Jr., D.O.       • prochlorperazine (COMPAZINE) injection 5-10 mg  5-10 mg Intravenous Q4HRS PRN Pablito Mills Jr., D.O.       • ondansetron (ZOFRAN) syringe/vial injection 4 mg  4 mg Intravenous Q6HRS PRN Pablito Mills Jr., D.O.       • enoxaparin (LOVENOX) inj 40 mg  40 mg Subcutaneous DAILY Pablito Mills Jr., D.O.   40 mg at 11/27/20 0422   • MD Alert...ICU Electrolyte Replacement per Pharmacy   Other PHARMACY TO DOSE Adalberto Galeana M.D.           Fluids    Intake/Output Summary (Last 24 hours) at 11/27/2020 0711  Last data filed at 11/27/2020 0600  Gross per 24 hour   Intake 1686.62 ml   Output 2900 ml   Net -1213.38 ml       Laboratory  Recent Labs     11/25/20  0143 11/26/20  0428 11/27/20  0307   ISTATAPH 7.427 7.527* 7.404   ISTATAPCO2 51.3* 37.8* 55.3*   ISTATAPO2 77 42* 59*   ISTATATCO2 35* 32 36*   GGZZYRX2YMG 95 83* 90*   ISTATARTHCO3 33.8* 31.3* 34.6*   ISTATARTBE 8* 8* 8*   ISTATTEMP 38.3 C 96.6 F 98.6 F   ISTATFIO2 70 60 100   ISTATSPEC Arterial Arterial Arterial   ISTATAPHTC 7.407 7.544* 7.404   NDDJWZXQ5OV 84 39* 59*         Recent Labs      11/25/20 0240 11/26/20  0510 11/27/20  0450   SODIUM 133* 132* 132*   POTASSIUM 3.9 3.2* 4.1   CHLORIDE 94* 93* 94*   CO2 34* 29 30   BUN 23* 12 19   CREATININE 0.49* 0.32* 0.41*   MAGNESIUM  --   --  2.1   PHOSPHORUS 2.6 2.7 3.3   CALCIUM 8.9 8.6 8.3*     Recent Labs     11/25/20 0240 11/26/20  0510 11/27/20  0450   GLUCOSE 152* 93 130*     Recent Labs     11/25/20 0240 11/26/20  0510 11/27/20  0450   WBC 16.3* 16.4* 11.9*   NEUTSPOLYS 89.60* 87.80*  --    LYMPHOCYTES 5.80* 2.60*  --    MONOCYTES 2.30 0.90  --    EOSINOPHILS 1.00 1.70  --    BASOPHILS 0.40 0.00  --      Recent Labs     11/25/20 0240 11/26/20 0510 11/27/20  0450   RBC 3.75* 4.12* 3.67*   HEMOGLOBIN 11.4* 12.5* 10.9*   HEMATOCRIT 34.9* 38.4* 34.1*   PLATELETCT 238 260 198       Imaging  Chest x-ray personally reviewed and compared to prior film showing unchanged diffuse bilateral infiltrates with left upper lobe sparing, endotracheal tube is too high and needs to be advanced, gastric tube and left IJ central venous catheter are in good position    Personally performed and interpreted bedside limited cardiac ultrasound revealing underfilled right ventricle with trace pericardial effusion, IVC measuring 2 cm with minimal respiratory variability    Assessment/Plan  * Acute respiratory failure with hypoxia (HCC)- (present on admission)  Assessment & Plan  Intubated on 11/13  Continue full vent support, transition back to CMV given worsening overall condition, high mandatory MV and PEEP  Attempt to titrate FiO2 to goal SaO2 greater than 85%  RT/O2 protocol  Sedation, analgesia, paralysis today  Continue Lasix    Atrial fibrillation with RVR (HCC)  Assessment & Plan  New onset with associated shock 11/27  S/p unsuccessful electrical cardioversion x3  IV digoxin load  IV amiodarone boluses followed by drip  Optimize electrolytes, additional magnesium given today  Repeat limited echocardiography if persists  Transition to therapeutic  anticoagulation    Bacterial pneumonia  Assessment & Plan  Likely superinfection, 11/23 cultures negative  Continue cefepime x7 days (day 5 of 7)    Shock (HCC)  Assessment & Plan  Continue to titrate norepinephrine, vasopressin to maintain MAP greater than 60 --> exchange in NE for [place]-Synephrine  Continue high dose of oral midodrine  Monitor blood pressure closely  Continue Lasix    Acute respiratory distress syndrome (ARDS) due to COVID-19 virus (HCC)  Assessment & Plan  Continue lung protective ventilation strategies  Monitor daily P:F and compliance  Continue forced diuresis   Reparalyzed and heavily sedate today given worsening compliance and oxygenation    COVID-19 virus infection- (present on admission)  Assessment & Plan  Continue with the following therapies while monitoring closely:  Decadron: Completed 10-day course 11/21  Remdesivir: Completed 5-day course  Diuresis: Continue Lasix twice daily  Antitussives, supportive care, monitoring inflammatory markers and LFTs  Maintain strict isolation precautions  S/p 72 hours of proning protocols twice  Sedation and paralytic today    Acute renal failure with tubular necrosis (HCC)  Assessment & Plan  Unchanged today  Avoid nephrotoxins  Monitor creatinine, urine output, electrolytes closely  Continue diuresis with current dose    Slow transit constipation  Assessment & Plan  Improved after Relistor and bowel protocol  Continue aggressive bowel protocol  Aspiration precautions    Transaminitis- (present on admission)  Assessment & Plan  Likely 2/2 COVID-19  Avoid hepatotoxins  Monitor synthetic function    Hyponatremia  Assessment & Plan  Unchanged  Monitor with daily serum sodium level    Hypomagnesemia  Assessment & Plan  Repleted    Hypokalemia- (present on admission)  Assessment & Plan  Repleted       VTE:  Lovenox  Ulcer: H2 Antagonist  Lines: Central Line  Ongoing indication addressed and Sawyer Catheter  Ongoing indication addressed    I have performed a  physical exam and reviewed and updated ROS and Plan today (11/27/2020). In review of yesterday's note (11/26/2020), there are no changes except as documented above.     Patient remains critically ill today requiring active management of his mechanical ventilatory support, titration of increasing vasopressors for worsening shock, antiarrhythmics.  His overall condition is deteriorating very quickly leaving him with a very poor prognosis.  I will discuss ongoing goals of care with family today. High risk of deterioration and worsening vital organ dysfunction and death without the above critical care interventions.    Discussed patient condition and risk of morbidity and/or mortality with Family, RN, RT, Pharmacy, Charge nurse / hot rounds and Patient.  Critical care time = 105 minutes in directly providing and coordinating critical care and extensive data review.  No time overlap and excludes procedures.

## 2020-11-28 NOTE — PROGRESS NOTES
Received patient 11/27/2020 @ 1900 in bilateral soft wrist restraints.  Throughout shift assessment q2h.  No complications.

## 2020-11-28 NOTE — PROGRESS NOTES
Critical Care Progress Note    Date of admission  11/12/2020    Chief Complaint  55 y.o. male admitted 11/12/2020 with COVID pneumonia and acute hypoxic respiratory failure.    Hospital Course  Mr. Costa is a 55 year old male with no past medical history who tested positive for COVID on 11/6/2020 and has progressive worsening of shortness of breath and now admitted to the ICU for acute hypoxic respiratory failure due to COVID pneumonia.  11/13 - remains on max HFNC with NRBM, remdesivir/decadron, starting lasix, intubated and proned/paralyzed  11/14 - full vent support, 16/70%, diuresing well with lasix, P/F ratio 178, compliance 35, cont proning protocol  11/15 - full vent support, 16/60%, diuresing well, P/F ratio 180, compliance 38, cont proning protocol tonight   11/16 - full vent support, 16/50%, diuresing well, P/F ratio at 210 with compliance of 38, hold proning tonight, stop paralytics  11/17 - full vent support, did not tolerate being off paralytics and holding proning-->p/f ratio 86, compliance of 39, PEEP 14 with FiO2 of 100%, proned/paralyzed  11/18 - full vent support, 14/70%, paralyzed/proned, p/f 111, compliance 44, stopped propofol and started versed  11/19 - full vent support, 14/60%, paralyzed/proned, p/f 137, compliance 47, versed  11/20 - full vent support, 14/60%, hold proning, continue paralytics, p/f 153, compliance 27  11/21 - full vent support, 12/100%, stop paralytics, cont heavy sedation, P/F 87, Compliance 27  11/23 -full vent support, sedation weaning, remains febrile  11/24 -awake and following, ongoing Vanco and cefepime, Levophed, full vent support  11/25 -full vent support on APRV, back on Levophed  11/27 -increasing need for full vent support, escalating vasopressors, new onset A. fib with RVR, GOC discussions  11/28 -full vent support with PEEP up to 17, Jordan-Synephrine drip, propofol/fentanyl    Interval Problem Update  Reviewed last 24 hour events:   - back in NSR on amio  gtt   - minimal neuro response this morning but still overbreathes at 30-40 bpm, prop + fent gtts   - Tm 38.9 with cooling blanket   - dilan gtt @ 190   - moisés Tfs, no BM for 2 days again   - good UOP   - ongoing GOC discussions with family   - Na up to 135   - nl kidney function   - lovenox for Afib   - day 6/7 cefepime   - low phos   - lasix 20 BID   - midodrine   - TID seroquel and xanax    Review of Systems  Review of Systems   Unable to perform ROS: Intubated        Vital Signs for last 24 hours   Pulse:  [] 97  Resp:  [26-41] 37  BP: ()/(51-74) 96/62  SpO2:  [85 %-95 %] 94 %    Respiratory Information for the last 24 hours  Vent Mode: APVCMV  Rate (breaths/min): 35  Vt Target (mL): 420  PEEP/CPAP: 15  MAP: 20  Control VTE (exp VT): 435     Physical Exam   Physical Exam  Vitals signs and nursing note reviewed.   Constitutional:       General: He is in acute distress.      Appearance: He is overweight. He is ill-appearing.      Interventions: He is sedated, intubated and restrained.      Comments: Heavily sedated but continues to overbreathing ventilator and appears to be suffering   HENT:      Head: Normocephalic.      Nose: Nose normal. No rhinorrhea.      Comments: Nasal feeding tube in place     Mouth/Throat:      Mouth: Mucous membranes are dry.      Comments: Endotracheal tube and bite block in place  Eyes:      Pupils: Pupils are equal, round, and reactive to light.   Neck:      Comments: Left IJ central venous catheter without surrounding hematoma  Cardiovascular:      Rate and Rhythm: Normal rate and regular rhythm. Frequent extrasystoles are present.     Chest Wall: PMI is not displaced.      Pulses: Decreased pulses.           Radial pulses are 1+ on the right side and 1+ on the left side.      Heart sounds: Heart sounds are distant. No murmur.      Comments: Remains on vasopressor support  Pulmonary:      Effort: Tachypnea and respiratory distress present. He is intubated.      Breath  sounds: Examination of the right-upper field reveals rhonchi. Examination of the left-upper field reveals rhonchi. Examination of the right-middle field reveals rhonchi. Examination of the left-middle field reveals rhonchi. Examination of the right-lower field reveals rhonchi and rales. Examination of the left-lower field reveals rhonchi and rales. Rhonchi and rales present. No decreased breath sounds.      Comments: Plateau pressures 34-36 with poor compliance  Abdominal:      General: Bowel sounds are normal. There is distension.      Palpations: Abdomen is soft.      Tenderness: There is no abdominal tenderness. There is no rebound.   Genitourinary:     Comments: Sawyer catheter in place  Musculoskeletal:      Right lower leg: Edema present.      Left lower leg: Edema present.   Lymphadenopathy:      Cervical: No cervical adenopathy.   Skin:     General: Skin is warm and dry.      Capillary Refill: Capillary refill takes 2 to 3 seconds.      Coloration: Skin is pale. Skin is not jaundiced.   Neurological:      Mental Status: He is lethargic.      Comments: Remains heavily sedated this morning, lethargic, withdraws to pain minimally   Psychiatric:      Comments:  unable to assess given current clinical condition         Medications  Current Facility-Administered Medications   Medication Dose Route Frequency Provider Last Rate Last Admin   • amiodarone (NEXTERONE) 360 mg/200 mL infusion  0.5-1 mg/min Intravenous Continuous Jeremy M Gonda, M.D. 17 mL/hr at 11/28/20 0645 0.5 mg/min at 11/28/20 0645   • phenylephrine (RAYO-SYNEPHRINE) 40 mg in  mL Infusion  0-300 mcg/min Intravenous Continuous Jeremy M Gonda, M.D. 71.3 mL/hr at 11/28/20 0608 190 mcg/min at 11/28/20 0608   • enoxaparin (LOVENOX) inj 100 mg  100 mg Subcutaneous BID Jeremy M Gonda, M.D.   100 mg at 11/28/20 0534   • ALPRAZolam (XANAX) tablet 0.5 mg  0.5 mg Enteral Tube TID Jeremy M Gonda, M.D.   0.5 mg at 11/28/20 0533   • midodrine (PROAMATINE)  tablet 15 mg  15 mg Enteral Tube TID WITH MEALS Jeremy M Gonda, M.D.   15 mg at 11/27/20 1737   • lactulose 20 GM/30ML solution 30 mL  30 mL Enteral Tube TID PRN Jeremy M Gonda, M.D.       • furosemide (LASIX) injection 20 mg  20 mg Intravenous Q12HRS Jeremy M Gonda, M.D.   20 mg at 11/28/20 0533   • QUEtiapine (Seroquel) tablet 100 mg  100 mg Enteral Tube Q8HRS Jeremy M Gonda, M.D.   100 mg at 11/28/20 0534   • acetaminophen (Tylenol) tablet 650 mg  650 mg Enteral Tube Q6HRS Jeremy M Gonda, M.D.   650 mg at 11/28/20 0534   • cefepime (MAXIPIME) 2 g in  mL IVPB  2 g Intravenous Q12HRS Jeremy M Gonda, M.D.   Stopped at 11/28/20 0604   • oxyCODONE immediate-release (ROXICODONE) tablet 5-10 mg  5-10 mg Enteral Tube Q4HRS PRN Jeremy M Gonda, M.D.   10 mg at 11/23/20 1128   • fentaNYL (SUBLIMAZE) injection 100 mcg  100 mcg Intravenous Q15 MIN PRN Jeremy M Gonda, M.D.   100 mcg at 11/26/20 2304    And   • fentaNYL (SUBLIMAZE) injection 200 mcg  200 mcg Intravenous Q15 MIN PRN Jeremy M Gonda, M.D.   200 mcg at 11/24/20 2015    And   • fentaNYL (SUBLIMAZE) 50 mcg/mL in 50mL (Continuous Infusion)   Intravenous Continuous Jeremy M Gonda, M.D. 4 mL/hr at 11/28/20 0712 200 mcg/hr at 11/28/20 0712   • propofol (DIPRIVAN) injection  0-80 mcg/kg/min Intravenous Continuous Jeremy M Gonda, M.D. 34.7 mL/hr at 11/28/20 0607 60 mcg/kg/min at 11/28/20 0607   • norepinephrine (Levophed) infusion 8 mg/250 mL (premix)  0-30 mcg/min Intravenous Continuous Patti Park M.D.   Stopped at 11/27/20 0825   • promethazine (PHENERGAN) tablet 12.5-25 mg  12.5-25 mg Enteral Tube Q4HRS PRN Patti Park M.D.       • Respiratory Therapy Consult   Nebulization Continuous RT Mahsa Reis M.D.       • ipratropium-albuterol (DUONEB) nebulizer solution  3 mL Nebulization Q2HRS PRN (RT) Mahsa Reis M.D.       • famotidine (PEPCID) tablet 20 mg  20 mg Enteral Tube Q12HRS Mahsa Reis M.D.   20 mg at 11/28/20 0534    Or   • famotidine  (PEPCID) injection 20 mg  20 mg Intravenous Q12HRS Mahsa Reis M.D.   20 mg at 11/26/20 1738   • Pharmacy Consult: Enteral tube insertion - review meds/change route/product selection  1 Each Other PHARMACY TO DOSE Mahsa Reis M.D.       • senna-docusate (PERICOLACE or SENOKOT S) 8.6-50 MG per tablet 2 Tab  2 Tab Enteral Tube BID Mahsa Reis M.D.   2 Tab at 11/28/20 0534    And   • polyethylene glycol/lytes (MIRALAX) PACKET 1 Packet  1 Packet Enteral Tube QDAY PRN Mahsa Reis M.D.   1 Packet at 11/28/20 0004    And   • magnesium hydroxide (MILK OF MAGNESIA) suspension 30 mL  30 mL Enteral Tube QDAY PRN Mahsa Reis M.D.   30 mL at 11/19/20 1738    And   • bisacodyl (DULCOLAX) suppository 10 mg  10 mg Rectal QDAY PRN Mahsa Reis M.D.   10 mg at 11/24/20 2324   • ondansetron (ZOFRAN ODT) dispertab 4 mg  4 mg Enteral Tube Q6HRS PRN Mahsa Reis M.D.       • promethazine (PHENERGAN) suppository 12.5-25 mg  12.5-25 mg Rectal Q4HRS PRN Pablito Mills Jr., D.O.       • prochlorperazine (COMPAZINE) injection 5-10 mg  5-10 mg Intravenous Q4HRS PRN Pablito Mills Jr., D.O.       • ondansetron (ZOFRAN) syringe/vial injection 4 mg  4 mg Intravenous Q6HRS PRN Pablito Mills Jr., D.O.       • MD Alert...ICU Electrolyte Replacement per Pharmacy   Other PHARMACY TO DOSE Adalberto Galeana M.D.           Fluids    Intake/Output Summary (Last 24 hours) at 11/28/2020 0725  Last data filed at 11/28/2020 0600  Gross per 24 hour   Intake 3244.04 ml   Output 2770 ml   Net 474.04 ml       Laboratory  Recent Labs     11/27/20  0307 11/27/20  1118 11/28/20  0238   ISTATAPH 7.404 7.301* 7.350*   ISTATAPCO2 55.3* 74.1* 66.0*   ISTATAPO2 59* 90* 60*   ISTATATCO2 36* 39* 38*   YZDXCFI6XLW 90* 95 88*   ISTATARTHCO3 34.6* 36.5* 36.4*   ISTATARTBE 8* 8* 9*   ISTATTEMP 98.6 F 37.3 C 99.1 F   ISTATFIO2 100 100 80   ISTATSPEC Arterial Arterial Arterial   ISTATAPHTC 7.404 7.297* 7.346*   [id number] 59* 92* 61*          Recent Labs     11/26/20  0510 11/27/20  0450 11/28/20  0543   SODIUM 132* 132* 135   POTASSIUM 3.2* 4.1 4.2   CHLORIDE 93* 94* 95*   CO2 29 30 34*   BUN 12 19 21   CREATININE 0.32* 0.41* 0.34*   MAGNESIUM  --  2.1 2.2   PHOSPHORUS 2.7 3.3 2.1*   CALCIUM 8.6 8.3* 8.1*     Recent Labs     11/26/20  0510 11/27/20  0450 11/28/20  0543   GLUCOSE 93 130* 112*     Recent Labs     11/26/20  0510 11/27/20  0450   WBC 16.4* 11.9*   NEUTSPOLYS 87.80* 80.50*   LYMPHOCYTES 2.60* 0.80*   MONOCYTES 0.90 2.50   EOSINOPHILS 1.70 2.50   BASOPHILS 0.00 0.00     Recent Labs     11/26/20  0510 11/27/20  0450   RBC 4.12* 3.67*   HEMOGLOBIN 12.5* 10.9*   HEMATOCRIT 38.4* 34.1*   PLATELETCT 260 198       Imaging  Chest x-ray personally reviewed and compared to prior film showing no improvement in diffuse bilateral infiltrates, endotracheal tube/gastric tube/left IJ central venous catheter in good position    Assessment/Plan  * Acute respiratory failure with hypoxia (HCC)- (present on admission)  Assessment & Plan  Intubated on 11/13 (ventilator day 16)  Continue full vent support, increase PEEP to 17, continue high mandatory minute ventilation  Attempt to titrate FiO2 to goal SaO2 greater than 85%  RT/O2 protocol  Continue sedation, analgesia without wean today  Continue Lasix    Atrial fibrillation with RVR (HCC)  Assessment & Plan  New onset with associated shock 11/27 --> converted back to normal sinus rhythm  S/p unsuccessful electrical cardioversion x3  S/p IV digoxin load  S/p IV amiodarone boluses followed by drip --> continue drip for now  Optimize electrolytes  Continue therapeutic anticoagulation    Bacterial pneumonia  Assessment & Plan  Likely superinfection, 11/23 cultures negative  Continue cefepime x7 days (day 6 of 7)    Shock (HCC)  Assessment & Plan  Continue to titrate phenylephrine drip to maintain MAP greater than 60   Continue high dose of oral midodrine  Monitor blood pressure closely  Continue Lasix    Acute  respiratory distress syndrome (ARDS) due to COVID-19 virus (HCC)  Assessment & Plan  In the severe fibrotic terminal stage of COVID-19 unfortunately  Continue lung protective ventilation strategies  Monitor daily P:F and compliance  Continue forced diuresis     COVID-19 virus infection- (present on admission)  Assessment & Plan  Continue with the following therapies while monitoring closely:  Decadron: Completed 10-day course 11/21  Remdesivir: Completed 5-day course  Diuresis: Continue Lasix twice daily  Antitussives, supportive care, monitoring inflammatory markers and LFTs  Maintain strict isolation precautions  S/p 72 hours of proning protocols twice  Sedation and paralysis as needed    Acute renal failure with tubular necrosis (HCC)  Assessment & Plan  Improving  Avoid nephrotoxins  Monitor creatinine, urine output, electrolytes closely  Continue diuresis with current dose    Slow transit constipation  Assessment & Plan  Initially improved after Relistor and bowel protocol, now recurring -->  Increase aggressiveness of bowel protocol  Aspiration precautions    Transaminitis- (present on admission)  Assessment & Plan  Likely 2/2 COVID-19  Avoid hepatotoxins  Monitor synthetic function    Hyponatremia  Assessment & Plan  Improving  Monitor with daily serum sodium level    Hypomagnesemia  Assessment & Plan  Repleted    Hypokalemia- (present on admission)  Assessment & Plan  Repletion and monitor closely       VTE:  Lovenox  Ulcer: H2 Antagonist  Lines: Central Line  Ongoing indication addressed and Sawyer Catheter  Ongoing indication addressed    I have performed a physical exam and reviewed and updated ROS and Plan today (11/28/2020). In review of yesterday's note (11/27/2020), there are no changes except as documented above.     Patient is critically ill today requiring active management of his mechanical ventilatory support, titration of vasopressor, antiarrhythmic drip, sedation and analgesia.  Ongoing goals of  care discussions with family given his dismal prognosis. High risk of deterioration and worsening vital organ dysfunction and death without the above critical care interventions.    Discussed patient condition and risk of morbidity and/or mortality with RN, RT, Pharmacy, Charge nurse / hot rounds, Patient and Palliative care.  Critical care time = 89 minutes in directly providing and coordinating critical care and extensive data review.  No time overlap and excludes procedures.

## 2020-11-28 NOTE — PALLIATIVE CARE
"Palliative Care follow-up  PC RN placed a call to SAMPSON Orellana to f/u on her discussion with Dr. Gonda today and to provide support using  # [phone]. PC reached she and her daughters who remained on the call the entire time. They denied any questions about the information from Dr. Gonda and state they are \"hoping for a miracle.\" PC reiterated the severity of his situation and understanding of the plan to f/u this weekend on the POC. She states \"we know he is not doing well and this is in God's hands.\" PC discussed code status again in detail and queried their wishes should God make the decision that it was time for Iain. PC expressed concern for the additional pain and suffering he would experience with minimal benefit given his grave clinical situation; they express understanding. Esteban asked for some time to call his brother and she \"doesn't feel comfortable making this decision on my own.\" PC encouraged this and provided them with this RN's number to call back. They are aware they should call the BS RN should they not reach this RN with a decision. They were appreciative of the support of the teams and care of Iain.     Updated: RN/MD    Plan: f/u when family calls back    Thank you for allowing Palliative Care to support this patient and family. Contact [phone] for additional assistance, change in patient status, or with any questions/concerns.   "

## 2020-11-28 NOTE — PROGRESS NOTES
"This RN received phone call from Patient's significant other's daughter, Debra, with Pt's SO in the background.  Debra states, \"my mother, Esteban, spoke with Iain's brother and she has decided she still wants everything done to save Iain's life.\"  Pt remains a full code status and assured Debra and Esteban of this.  All parties deny further questions/concerns at this time.   "

## 2020-11-29 PROBLEM — E83.39 HYPOPHOSPHATEMIA: Status: ACTIVE | Noted: 2020-01-01

## 2020-11-29 NOTE — PROGRESS NOTES
Received patient 11/28/2020 @ 1900 in bilateral soft wrist restraints.  Throughout shift assessment q2h.  No complications.

## 2020-11-29 NOTE — PROGRESS NOTES
Critical Care Progress Note    Date of admission  11/12/2020    Chief Complaint  55 y.o. male admitted 11/12/2020 with COVID pneumonia and acute hypoxic respiratory failure.    Hospital Course  Mr. Costa is a 55 year old male with no past medical history who tested positive for COVID on 11/6/2020 and has progressive worsening of shortness of breath and now admitted to the ICU for acute hypoxic respiratory failure due to COVID pneumonia.  11/13 - remains on max HFNC with NRBM, remdesivir/decadron, starting lasix, intubated and proned/paralyzed  11/14 - full vent support, 16/70%, diuresing well with lasix, P/F ratio 178, compliance 35, cont proning protocol  11/15 - full vent support, 16/60%, diuresing well, P/F ratio 180, compliance 38, cont proning protocol tonight   11/16 - full vent support, 16/50%, diuresing well, P/F ratio at 210 with compliance of 38, hold proning tonight, stop paralytics  11/17 - full vent support, did not tolerate being off paralytics and holding proning-->p/f ratio 86, compliance of 39, PEEP 14 with FiO2 of 100%, proned/paralyzed  11/18 - full vent support, 14/70%, paralyzed/proned, p/f 111, compliance 44, stopped propofol and started versed  11/19 - full vent support, 14/60%, paralyzed/proned, p/f 137, compliance 47, versed  11/20 - full vent support, 14/60%, hold proning, continue paralytics, p/f 153, compliance 27  11/21 - full vent support, 12/100%, stop paralytics, cont heavy sedation, P/F 87, Compliance 27  11/23 -full vent support, sedation weaning, remains febrile  11/24 -awake and following, ongoing Vanco and cefepime, Levophed, full vent support  11/25 -full vent support on APRV, back on Levophed  11/27 -increasing need for full vent support, escalating vasopressors, new onset A. fib with RVR, GOC discussions  11/28 -full vent support with PEEP up to 17, Jordan-Synephrine drip, propofol/fentanyl  11/29 -full vent support, ongoing neodrip, tachypneic, completed  antibiotics    Interval Problem Update  Reviewed last 24 hour events:   - TM 39.1, decreased WBC, cooling blanket   - sinus tach, SBP  on dilan gtt 240   - propofol 60 and fent 200 gtts   - no withdrawal to pain, tachypneic in the 60s and vent dyssynchrony with sedation vacation   - Hgb unchanged at 10   - moisés Tfs at goal, small BM today   - underwood with 1.5 L UOP   - amio gtt x 48 hrs   - day 7/7 cefepime   - midodrine 15 TID   - lasix   - low phos    Review of Systems  Review of Systems   Unable to perform ROS: Intubated        Vital Signs for last 24 hours   Temp:  [37.8 °C (100 °F)-39.1 °C (102.4 °F)] 38.1 °C (100.6 °F)  Pulse:  [] 98  Resp:  [33-50] 38  BP: ()/(52-75) 99/61  SpO2:  [84 %-94 %] 91 %    Respiratory Information for the last 24 hours  Vent Mode: APVCMV  Rate (breaths/min): 35  Vt Target (mL): 420  PEEP/CPAP: 14  MAP: 26  Control VTE (exp VT): 410     Physical Exam   Physical Exam  Vitals signs and nursing note reviewed.   Constitutional:       General: He is in acute distress.      Appearance: He is overweight. He is ill-appearing.      Interventions: He is sedated, intubated and restrained.   HENT:      Head: Normocephalic and atraumatic.      Right Ear: External ear normal.      Left Ear: External ear normal.      Nose: Nose normal.      Comments: Nasal feeding tube in place     Mouth/Throat:      Mouth: Mucous membranes are dry.      Pharynx: Oropharynx is clear.      Comments: Endotracheal tube and bite block in place  Eyes:      Conjunctiva/sclera: Conjunctivae normal.      Pupils: Pupils are equal, round, and reactive to light.   Neck:      Musculoskeletal: No muscular tenderness.      Comments: Left IJ central venous catheter without surrounding hematoma  Cardiovascular:      Rate and Rhythm: Regular rhythm. Tachycardia present. Occasional extrasystoles are present.     Chest Wall: PMI is not displaced.      Pulses: Decreased pulses.           Radial pulses are 1+ on the right  side and 1+ on the left side.      Heart sounds: Heart sounds are distant. No murmur.      Comments: Remains on vasopressor support  Pulmonary:      Effort: Tachypnea, accessory muscle usage and respiratory distress present. He is intubated.      Breath sounds: Examination of the right-upper field reveals rhonchi. Examination of the left-upper field reveals rhonchi. Examination of the right-middle field reveals rhonchi. Examination of the left-middle field reveals rhonchi. Examination of the right-lower field reveals rhonchi and rales. Examination of the left-lower field reveals rhonchi and rales. Decreased breath sounds, rhonchi and rales present. No wheezing.      Comments: Plateau pressures remain 34-36 with poor compliance, minimal secretions  Abdominal:      General: Bowel sounds are normal. There is distension.      Palpations: Abdomen is soft.      Tenderness: There is no abdominal tenderness. There is no guarding or rebound.   Genitourinary:     Comments: Sawyer catheter in place  Musculoskeletal:         General: No tenderness.      Right lower leg: Edema present.      Left lower leg: Edema present.   Skin:     General: Skin is warm and dry.      Capillary Refill: Capillary refill takes 2 to 3 seconds.      Coloration: Skin is pale.   Neurological:      Mental Status: He is lethargic.      Comments: Remains heavily sedated this morning, lethargic, withdraws to pain minimally   Psychiatric:      Comments:  unable to assess given current clinical condition         Medications  Current Facility-Administered Medications   Medication Dose Route Frequency Provider Last Rate Last Admin   • sodium phosphate 30 mmol in 5% dextrose 500 mL ivpb  30 mmol Intravenous Once Jeremy M Gonda, M.D.       • phenylephrine (RAYO-SYNEPHRINE) 80 mg in  mL Infusion **NOTE CONCENTRATION**   0-300 mcg/min Intravenous Continuous Pablito Mills Jr., D.O. 45 mL/hr at 11/29/20 0744 240 mcg/min at 11/29/20 0744   • amiodarone  (NEXTERONE) 360 mg/200 mL infusion  0.5-1 mg/min Intravenous Continuous Jeremy M Gonda, M.D. 17 mL/hr at 11/29/20 0447 0.5 mg/min at 11/29/20 0447   • enoxaparin (LOVENOX) inj 100 mg  100 mg Subcutaneous BID Jeremy M Gonda, M.D.   100 mg at 11/29/20 0447   • ALPRAZolam (XANAX) tablet 0.5 mg  0.5 mg Enteral Tube TID Jeremy M Gonda, M.D.   0.5 mg at 11/29/20 0448   • midodrine (PROAMATINE) tablet 15 mg  15 mg Enteral Tube TID WITH MEALS Jeremy M Gonda, M.D.   15 mg at 11/29/20 0727   • lactulose 20 GM/30ML solution 30 mL  30 mL Enteral Tube TID PRN Jeremy M Gonda, M.D.       • furosemide (LASIX) injection 20 mg  20 mg Intravenous Q12HRS Jeremy M Gonda, M.D.   20 mg at 11/29/20 0448   • QUEtiapine (Seroquel) tablet 100 mg  100 mg Enteral Tube Q8HRS Jeremy M Gonda, M.D.   100 mg at 11/29/20 0449   • acetaminophen (Tylenol) tablet 650 mg  650 mg Enteral Tube Q6HRS Jeremy M Gonda, M.D.   650 mg at 11/29/20 0448   • cefepime (MAXIPIME) 2 g in  mL IVPB  2 g Intravenous Q12HRS Jeremy M Gonda, M.D.   Stopped at 11/29/20 0518   • oxyCODONE immediate-release (ROXICODONE) tablet 5-10 mg  5-10 mg Enteral Tube Q4HRS PRN Jeremy M Gonda, M.D.   10 mg at 11/23/20 1128   • fentaNYL (SUBLIMAZE) injection 100 mcg  100 mcg Intravenous Q15 MIN PRN Jeremy M Gonda, M.D.   100 mcg at 11/26/20 2304    And   • fentaNYL (SUBLIMAZE) injection 200 mcg  200 mcg Intravenous Q15 MIN PRN Jeremy M Gonda, M.D.   200 mcg at 11/24/20 2015    And   • fentaNYL (SUBLIMAZE) 50 mcg/mL in 50mL (Continuous Infusion)   Intravenous Continuous Jeremy M Gonda, M.D. 4 mL/hr at 11/28/20 0712 New Bag at 11/28/20 1817   • propofol (DIPRIVAN) injection  0-80 mcg/kg/min Intravenous Continuous Jeremy M Gonda, M.D. 34.7 mL/hr at 11/29/20 0744 60 mcg/kg/min at 11/29/20 0744   • promethazine (PHENERGAN) tablet 12.5-25 mg  12.5-25 mg Enteral Tube Q4HRS PRN Patti Park M.D.       • Respiratory Therapy Consult   Nebulization Continuous RT Mahsa Reis M.D.        • ipratropium-albuterol (DUONEB) nebulizer solution  3 mL Nebulization Q2HRS PRN (RT) Mahsa Reis M.D.       • famotidine (PEPCID) tablet 20 mg  20 mg Enteral Tube Q12HRS Mahsa Reis M.D.   20 mg at 11/29/20 0448    Or   • famotidine (PEPCID) injection 20 mg  20 mg Intravenous Q12HRS Mahsa Reis M.D.   20 mg at 11/26/20 1738   • Pharmacy Consult: Enteral tube insertion - review meds/change route/product selection  1 Each Other PHARMACY TO DOSE Mahsa Reis M.D.       • senna-docusate (PERICOLACE or SENOKOT S) 8.6-50 MG per tablet 2 Tab  2 Tab Enteral Tube BID Mahsa Reis M.D.   2 Tab at 11/29/20 0448    And   • polyethylene glycol/lytes (MIRALAX) PACKET 1 Packet  1 Packet Enteral Tube QDAY PRN Mahsa Reis M.D.   1 Packet at 11/28/20 0004    And   • magnesium hydroxide (MILK OF MAGNESIA) suspension 30 mL  30 mL Enteral Tube QDAY PRN Mahsa Reis M.D.   30 mL at 11/28/20 1654    And   • bisacodyl (DULCOLAX) suppository 10 mg  10 mg Rectal QDAY PRN Mahsa Reis M.D.   10 mg at 11/28/20 1654   • ondansetron (ZOFRAN ODT) dispertab 4 mg  4 mg Enteral Tube Q6HRS PRN Mahsa Reis M.D.       • promethazine (PHENERGAN) suppository 12.5-25 mg  12.5-25 mg Rectal Q4HRS PRN Pablito Mills Jr., D.O.       • prochlorperazine (COMPAZINE) injection 5-10 mg  5-10 mg Intravenous Q4HRS PRN Pablito Mills Jr., D.O.       • ondansetron (ZOFRAN) syringe/vial injection 4 mg  4 mg Intravenous Q6HRS PRN Pablito Mills Jr., D.O.       • MD Alert...ICU Electrolyte Replacement per Pharmacy   Other PHARMACY TO DOSE Adalberto Galeana M.D.           Fluids    Intake/Output Summary (Last 24 hours) at 11/29/2020 0817  Last data filed at 11/29/2020 0600  Gross per 24 hour   Intake 3810.41 ml   Output 2770 ml   Net 1040.41 ml       Laboratory  Recent Labs     11/27/20  1118 11/28/20  0238 11/29/20  0205   ISTATAPH 7.301* 7.350* 7.412   ISTATAPCO2 74.1* 66.0* 60.8*   ISTATAPO2 90* 60* 47*   ISTATATCO2 39* 38* 40*    [id number] 95 88* 81*   ISTATARTHCO3 36.5* 36.4* 38.7*   ISTATARTBE 8* 9* 12*   ISTATTEMP 37.3 C 99.1 F 97.1 F   ISTATFIO2 100 80 80   ISTATSPEC Arterial Arterial Arterial   ISTATAPHTC 7.297* 7.346* 7.424   [id number] 92* 61* 44*         Recent Labs     11/27/20  0450 11/28/20  0543 11/29/20  0350   SODIUM 132* 135 135   POTASSIUM 4.1 4.2 4.0   CHLORIDE 94* 95* 94*   CO2 30 34* 36*   BUN 19 21 19   CREATININE 0.41* 0.34* 0.28*   MAGNESIUM 2.1 2.2 2.0   PHOSPHORUS 3.3 2.1* 1.9*   CALCIUM 8.3* 8.1* 8.2*     Recent Labs     11/27/20 0450 11/28/20  0543 11/29/20  0350   GLUCOSE 130* 112* 136*     Recent Labs     11/27/20  0450 11/28/20  0543 11/29/20  0350   WBC 11.9* 12.7* 10.8   NEUTSPOLYS 80.50* 84.20* 77.70*   LYMPHOCYTES 0.80* 6.90* 8.10*   MONOCYTES 2.50 2.40 3.10   EOSINOPHILS 2.50 5.10 7.70*   BASOPHILS 0.00 0.30 0.50     Recent Labs     11/27/20  0450 11/28/20  0543 11/29/20  0350   RBC 3.67* 3.49* 3.69*   HEMOGLOBIN 10.9* 10.3* 10.7*   HEMATOCRIT 34.1* 33.4* 35.2*   PLATELETCT 198 296 274       Imaging  Chest x-ray personally reviewed and compared to prior film showing worsening diffuse bilateral infiltrates, endotracheal tube/gastric tube/left IJ central venous catheter in good position    Assessment/Plan  * Acute respiratory failure with hypoxia (HCC)- (present on admission)  Assessment & Plan  Intubated on 11/13 (ventilator day 17)  Continue full vent support, keep PEEP at 17, continue high mandatory minute ventilation  Attempt to titrate FiO2 to goal SaO2 greater than 85%  RT/O2 protocol  Continue sedation, analgesia without wean today given degree of suffering  Continue Lasix    Atrial fibrillation with RVR ([facility])  Assessment & Plan  New onset with associated shock 11/27 --> converted back to normal sinus rhythm 11/28  S/p unsuccessful electrical cardioversion x3  S/p IV digoxin load  S/p IV amiodarone boluses followed by drip --> transition drip to oral today  Optimize electrolytes  Continue  therapeutic anticoagulation    Bacterial pneumonia  Assessment & Plan  Likely superinfection, 11/23 cultures negative  Continue cefepime x7 days (day 7 of 7)    Shock (HCC)  Assessment & Plan  Continue to titrate phenylephrine drip to maintain MAP greater than 60   Continue high dose of oral midodrine  Monitor blood pressure closely    Acute respiratory distress syndrome (ARDS) due to COVID-19 virus (HCC)  Assessment & Plan  In the severe fibrotic terminal stage of COVID-19 unfortunately  Continue lung protective ventilation strategies  Monitor daily P:F and compliance  Continue forced diuresis     COVID-19 virus infection- (present on admission)  Assessment & Plan  Continue with the following therapies while monitoring closely:  Decadron: Completed 10-day course 11/21  Remdesivir: Completed 5-day course  Diuresis: Continue Lasix twice daily  Antitussives, supportive care, monitoring inflammatory markers and LFTs  Maintain strict isolation precautions  S/p 72 hours of proning protocols twice  Sedation as needed    Acute renal failure with tubular necrosis (HCC)  Assessment & Plan  Improved  Avoid nephrotoxins  Monitor creatinine, urine output, electrolytes closely  Continue diuresis with current dose    Slow transit constipation  Assessment & Plan  Initially improved after Relistor and bowel protocol, now recurring -->  Increase aggressiveness of bowel protocol  Aspiration precautions    Transaminitis- (present on admission)  Assessment & Plan  Likely 2/2 COVID-19  Avoid hepatotoxins  Monitor synthetic function    Hypophosphatemia  Assessment & Plan  Repletion and monitor closely    Hyponatremia  Assessment & Plan  Improved  Monitor with daily serum sodium level    Hypomagnesemia  Assessment & Plan  Repleted    Hypokalemia- (present on admission)  Assessment & Plan  Repleted       VTE:  Lovenox  Ulcer: H2 Antagonist  Lines: Central Line  Ongoing indication addressed and Sawyer Catheter  Ongoing indication  "addressed    I have performed a physical exam and reviewed and updated ROS and Plan today (11/29/2020). In review of yesterday's note (11/28/2020), there are no changes except as documented above.     Patient remains critically ill today requiring active management of his mechanical ventilatory support, titration of pressors for shock, sedation/analgesia.  Dismal prognosis and have any difficult time helping family understand his overall condition and suffering.  Ongoing goals of care discussions.  High risk of deterioration and worsening vital organ dysfunction and death without the above critical care interventions.    Discussed patient condition and risk of morbidity and/or mortality with Family, RN, RT, Pharmacy, Code status disscussed, Charge nurse / hot rounds and Palliative care.  Critical care time = 60 minutes in directly providing and coordinating critical care and extensive data review.  No time overlap and excludes procedures.    Addendum 1452: I had a lengthy conversation with patient's wife and daughter over the phone regarding my concerns that we are prolonging patient's pain and suffering with no chance of recovery at this point.  I discussed with them a transfer to comfort care to focus on his dignity and to prevent further needless pain and suffering.  They state \"we do not want to even think about that at this point.\"  They state \"we have a lot of meri and are praying for a miracle and want to continue to take this day by day.\"  I informed them that my partner Dr. Farris will be taking over tomorrow and palliative care will continue to follow with ongoing conversations regarding this topic.  I do not feel that performing CPR on this patient would provide any benefit, rather would prolong further pain and suffering.  "

## 2020-11-30 NOTE — PROGRESS NOTES
Received patient 11/29/2020 @ 1900 in bilateral soft wrist restraints.  Throughout shift assessment q2h.  No complications.

## 2020-11-30 NOTE — PROGRESS NOTES
Critical Care Progress Note    Date of admission  11/12/2020    Chief Complaint  55 y.o. male admitted 11/12/2020 with COVID pneumonia and acute hypoxic respiratory failure.    Hospital Course  Mr. Costa is a 55 year old male with no past medical history who tested positive for COVID on 11/6/2020 and has progressive worsening of shortness of breath and now admitted to the ICU for acute hypoxic respiratory failure due to COVID pneumonia.  11/13 - remains on max HFNC with NRBM, remdesivir/decadron, starting lasix, intubated and proned/paralyzed  11/14 - full vent support, 16/70%, diuresing well with lasix, P/F ratio 178, compliance 35, cont proning protocol  11/15 - full vent support, 16/60%, diuresing well, P/F ratio 180, compliance 38, cont proning protocol tonight   11/16 - full vent support, 16/50%, diuresing well, P/F ratio at 210 with compliance of 38, hold proning tonight, stop paralytics  11/17 - full vent support, did not tolerate being off paralytics and holding proning-->p/f ratio 86, compliance of 39, PEEP 14 with FiO2 of 100%, proned/paralyzed  11/18 - full vent support, 14/70%, paralyzed/proned, p/f 111, compliance 44, stopped propofol and started versed  11/19 - full vent support, 14/60%, paralyzed/proned, p/f 137, compliance 47, versed  11/20 - full vent support, 14/60%, hold proning, continue paralytics, p/f 153, compliance 27  11/21 - full vent support, 12/100%, stop paralytics, cont heavy sedation, P/F 87, Compliance 27  11/23 -full vent support, sedation weaning, remains febrile  11/24 -awake and following, ongoing Vanco and cefepime, Levophed, full vent support  11/25 -full vent support on APRV, back on Levophed  11/27 -increasing need for full vent support, escalating vasopressors, new onset A. fib with RVR, GOC discussions  11/28 -full vent support with PEEP up to 17, Jordan-Synephrine drip, propofol/fentanyl  11/29 -full vent support, ongoing neodrip, tachypneic, completed antibiotics  11/30  -vent day 19, PEEP 10, 80%, intermittently following commands, Jordan-Synephrine to 80    Interval Problem Update  Reviewed last 24 hour events:  Prop 80, fent 200  SAT today; followed, + c/g/c  SR/ST   SBP   Jordan 280  Remains in SR  Tm = 101.1  moisés TF at goal  Vent day 18, 10, 80, Ppl 30  Full dose Lovenox  pepcid  Replacing K, phos, mg  PO amio  Midodrine TID  Seroquel, dec to 50    Review of Systems  Review of Systems   Unable to perform ROS: Intubated        Vital Signs for last 24 hours   Temp:  [37.1 °C (98.8 °F)-38.4 °C (101.1 °F)] 37.1 °C (98.8 °F)  Pulse:  [] 91  Resp:  [34-50] 40  BP: ()/(54-74) 87/54  SpO2:  [85 %-95 %] 91 %    Respiratory Information for the last 24 hours  Vent Mode: APVCMV  Rate (breaths/min): 35  Vt Target (mL): 440  PEEP/CPAP: 10  MAP: 15  Control VTE (exp VT): 453     Physical Exam   Physical Exam  Vitals signs and nursing note reviewed.   Constitutional:       Appearance: He is overweight. He is ill-appearing.      Interventions: He is sedated and restrained.      Comments: Intubated, full ventilator support   HENT:      Head: Normocephalic and atraumatic.      Right Ear: External ear normal.      Left Ear: External ear normal.      Nose: Nose normal.      Comments: Nasal feeding tube in place     Mouth/Throat:      Mouth: Mucous membranes are dry.      Pharynx: Oropharynx is clear.      Comments: ETT in place  Eyes:      Conjunctiva/sclera: Conjunctivae normal.      Pupils: Pupils are equal, round, and reactive to light.   Neck:      Musculoskeletal: No muscular tenderness.      Comments: Left IJ central venous catheter without surrounding hematoma  Cardiovascular:      Rate and Rhythm: Regular rhythm. Tachycardia present. Occasional extrasystoles are present.     Chest Wall: PMI is not displaced.      Pulses: Decreased pulses.           Radial pulses are 1+ on the right side and 1+ on the left side.      Heart sounds: Heart sounds are distant. No murmur.      Comments:  Remains on vasopressor support  Pulmonary:      Breath sounds: No wheezing.      Comments: Intubated, diminished breath sounds  Abdominal:      General: Bowel sounds are normal.      Palpations: Abdomen is soft.   Genitourinary:     Comments: Sawyer catheter in place  Musculoskeletal:         General: No tenderness.      Right lower leg: Edema present.      Left lower leg: Edema present.   Skin:     General: Skin is warm and dry.      Capillary Refill: Capillary refill takes 2 to 3 seconds.      Coloration: Skin is pale.   Neurological:      Mental Status: He is lethargic.      Comments: Sedated, intermittently follows commands with SAT   Psychiatric:      Comments:  unable to assess given current clinical condition         Medications  Current Facility-Administered Medications   Medication Dose Route Frequency Provider Last Rate Last Admin   • potassium phosphate 15 mmol in  mL ivpb  15 mmol Intravenous Once Rai Yusuf M.D. 83.3 mL/hr at 11/30/20 0831 15 mmol at 11/30/20 0831   • magnesium sulfate IVPB premix 2 g  2 g Intravenous Once Rai Yusuf M.D. 25 mL/hr at 11/30/20 0744 2 g at 11/30/20 0744   • amiodarone (Cordarone) tablet 400 mg  400 mg Enteral Tube TWICE DAILY Jeremy M Gonda, M.D.   400 mg at 11/30/20 0449   • phenylephrine (RAYO-SYNEPHRINE) 80 mg in  mL Infusion **NOTE CONCENTRATION**   0-300 mcg/min Intravenous Continuous Pablito Mills Jr., D.O. 52.5 mL/hr at 11/30/20 0831 280 mcg/min at 11/30/20 0831   • enoxaparin (LOVENOX) inj 100 mg  100 mg Subcutaneous BID Jeremy M Gonda, M.D.   100 mg at 11/30/20 0450   • midodrine (PROAMATINE) tablet 15 mg  15 mg Enteral Tube TID WITH MEALS Jeremy M Gonda, M.D.   15 mg at 11/30/20 0744   • lactulose 20 GM/30ML solution 30 mL  30 mL Enteral Tube TID PRN Jeremy M Gonda, M.D.       • furosemide (LASIX) injection 20 mg  20 mg Intravenous Q12HRS Jeremy M Gonda, M.D.   20 mg at 11/30/20 0449   • QUEtiapine (Seroquel) tablet 100 mg  100 mg Enteral  Tube Q8HRS Jeremy M Gonda, M.D.   100 mg at 11/30/20 0449   • acetaminophen (Tylenol) tablet 650 mg  650 mg Enteral Tube Q6HRS Jeremy M Gonda, M.D.   650 mg at 11/30/20 0449   • oxyCODONE immediate-release (ROXICODONE) tablet 5-10 mg  5-10 mg Enteral Tube Q4HRS PRN Jeremy M Gonda, M.D.   10 mg at 11/23/20 1128   • fentaNYL (SUBLIMAZE) injection 100 mcg  100 mcg Intravenous Q15 MIN PRN Jeremy M Gonda, M.D.   100 mcg at 11/26/20 2304    And   • fentaNYL (SUBLIMAZE) injection 200 mcg  200 mcg Intravenous Q15 MIN PRN Jeremy M Gonda, M.D.   200 mcg at 11/24/20 2015    And   • fentaNYL (SUBLIMAZE) 50 mcg/mL in 50mL (Continuous Infusion)   Intravenous Continuous Jeremy M Gonda, M.D. 4 mL/hr at 11/30/20 0505 200 mcg/hr at 11/30/20 0505   • propofol (DIPRIVAN) injection  0-80 mcg/kg/min Intravenous Continuous Jeremy M Gonda, M.D. 46.3 mL/hr at 11/30/20 0831 80 mcg/kg/min at 11/30/20 0831   • promethazine (PHENERGAN) tablet 12.5-25 mg  12.5-25 mg Enteral Tube Q4HRS PRN Patti Park M.D.       • Respiratory Therapy Consult   Nebulization Continuous RT Mahsa Reis M.D.       • ipratropium-albuterol (DUONEB) nebulizer solution  3 mL Nebulization Q2HRS PRN (RT) Mahsa Reis M.D.       • famotidine (PEPCID) tablet 20 mg  20 mg Enteral Tube Q12HRS Mahsa Reis M.D.   20 mg at 11/30/20 0449    Or   • famotidine (PEPCID) injection 20 mg  20 mg Intravenous Q12HRS Mahsa Reis M.D.   20 mg at 11/26/20 1738   • Pharmacy Consult: Enteral tube insertion - review meds/change route/product selection  1 Each Other PHARMACY TO DOSE Mahsa Reis M.D.       • senna-docusate (PERICOLACE or SENOKOT S) 8.6-50 MG per tablet 2 Tab  2 Tab Enteral Tube BID Mahsa Reis M.D.   2 Tab at 11/30/20 0449    And   • polyethylene glycol/lytes (MIRALAX) PACKET 1 Packet  1 Packet Enteral Tube QDAY PRN Mahsa Reis M.D.   1 Packet at 11/29/20 1807    And   • magnesium hydroxide (MILK OF MAGNESIA) suspension 30 mL  30 mL Enteral Tube  QDAY PRN Mahsa Reis M.D.   30 mL at 11/28/20 1654    And   • bisacodyl (DULCOLAX) suppository 10 mg  10 mg Rectal QDAY PRN Mahsa Reis M.D.   10 mg at 11/28/20 1654   • ondansetron (ZOFRAN ODT) dispertab 4 mg  4 mg Enteral Tube Q6HRS PRN Mahsa Reis M.D.       • promethazine (PHENERGAN) suppository 12.5-25 mg  12.5-25 mg Rectal Q4HRS PRN Pablito Mills Jr., D.O.       • prochlorperazine (COMPAZINE) injection 5-10 mg  5-10 mg Intravenous Q4HRS PRN Pablito Mills Jr., D.O.       • ondansetron (ZOFRAN) syringe/vial injection 4 mg  4 mg Intravenous Q6HRS PRN Pablito Mills Jr., D.O.       • MD Alert...ICU Electrolyte Replacement per Pharmacy   Other PHARMACY TO DOSE Adalberto Galeana M.D.           Fluids    Intake/Output Summary (Last 24 hours) at 11/30/2020 0927  Last data filed at 11/30/2020 0600  Gross per 24 hour   Intake 3705.68 ml   Output 2815 ml   Net 890.68 ml       Laboratory  Recent Labs     11/28/20  0238 11/29/20  0205 11/30/20  0346   ISTATAPH 7.350* 7.412 7.381*   ISTATAPCO2 66.0* 60.8* 64.0*   ISTATAPO2 60* 47* 54*   ISTATATCO2 38* 40* 40*   [id number] 88* 81* 86*   ISTATARTHCO3 36.4* 38.7* 37.9*   ISTATARTBE 9* 12* 11*   ISTATTEMP 99.1 F 97.1 F 98.4 F   ISTATFIO2 80 80 80   ISTATSPEC Arterial Arterial Arterial   ISTATAPHTC 7.346* 7.424 7.382*   [id number] 61* 44* 54*         Recent Labs     11/28/20  0543 11/29/20  0350 11/30/20  0338   SODIUM 135 135 135   POTASSIUM 4.2 4.0 3.8   CHLORIDE 95* 94* 95*   CO2 34* 36* 37*   BUN 21 19 18   CREATININE 0.34* 0.28* 0.29*   MAGNESIUM 2.2 2.0 1.9   PHOSPHORUS 2.1* 1.9* 2.4*   CALCIUM 8.1* 8.2* 8.2*     Recent Labs     11/28/20  0543 11/29/20  0350 11/30/20  0338   GLUCOSE 112* 136* 117*     Recent Labs     11/28/20  0543 11/29/20  0350 11/30/20  0338   WBC 12.7* 10.8 10.3   NEUTSPOLYS 84.20* 77.70* 64.90   LYMPHOCYTES 6.90* 8.10* 14.90*   MONOCYTES 2.40 3.10 1.80   EOSINOPHILS 5.10 7.70* 14.00*   BASOPHILS 0.30 0.50 0.90     Recent Labs      11/28/20  0543 11/29/20  0350 11/30/20  0338   RBC 3.49* 3.69* 3.59*   HEMOGLOBIN 10.3* 10.7* 10.5*   HEMATOCRIT 33.4* 35.2* 34.1*   PLATELETCT 296 274 251       Imaging  Chest x-ray personally reviewed and compared to prior film showing worsening diffuse bilateral infiltrates, endotracheal tube/gastric tube/left IJ central venous catheter in good position    Assessment/Plan  * Acute respiratory failure with hypoxia (HCC)- (present on admission)  Assessment & Plan  Intubated on 11/13 (ventilator day 17)  Continue full vent support, keep PEEP at 17, continue high mandatory minute ventilation  Attempt to titrate FiO2 to goal SaO2 greater than 85%  RT/O2 protocol  Continue sedation, analgesia without wean today given degree of suffering  Continue Lasix    Atrial fibrillation with RVR ([facility])  Assessment & Plan  New onset with associated shock 11/27 --> converted back to normal sinus rhythm 11/28  S/p unsuccessful electrical cardioversion x3  S/p IV digoxin load  S/p IV amiodarone boluses followed by drip --> transition drip to oral today  Optimize electrolytes  Continue therapeutic anticoagulation    Bacterial pneumonia  Assessment & Plan  Likely superinfection, 11/23 cultures negative  Continue cefepime x7 days (day 7 of 7)    Shock (HCC)  Assessment & Plan  Continue to titrate phenylephrine drip to maintain MAP greater than 60   Continue high dose of oral midodrine  Monitor blood pressure closely    Acute respiratory distress syndrome (ARDS) due to COVID-19 virus ([facility])  Assessment & Plan  In the severe fibrotic terminal stage of COVID-19 unfortunately  Continue lung protective ventilation strategies  Monitor daily P:F and compliance  Continue forced diuresis     COVID-19 virus infection- (present on admission)  Assessment & Plan  Continue with the following therapies while monitoring closely:  Decadron: Completed 10-day course 11/21  Remdesivir: Completed 5-day course  Diuresis: Continue Lasix twice  daily  Antitussives, supportive care, monitoring inflammatory markers and LFTs  Maintain strict isolation precautions  S/p 72 hours of proning protocols twice  Sedation as needed    Acute renal failure with tubular necrosis (HCC)  Assessment & Plan  Improved  Avoid nephrotoxins  Monitor creatinine, urine output, electrolytes closely  Continue diuresis with current dose    Slow transit constipation  Assessment & Plan  Initially improved after Relistor and bowel protocol, now recurring -->  Increase aggressiveness of bowel protocol  Aspiration precautions    Transaminitis- (present on admission)  Assessment & Plan  Likely 2/2 COVID-19  Avoid hepatotoxins  Monitor synthetic function    Hypophosphatemia  Assessment & Plan  Repletion and monitor closely    Hyponatremia  Assessment & Plan  Improved  Monitor with daily serum sodium level    Hypomagnesemia  Assessment & Plan  Repleted    Hypokalemia- (present on admission)  Assessment & Plan  Repleted    Updated plan:  Continue full ventilator support, family not ready to consider change in level of care  Overall prognosis very poor  Palliative care following and assisting    VTE:  Lovenox  Ulcer: H2 Antagonist  Lines: Central Line  Ongoing indication addressed and Sawyer Catheter  Ongoing indication addressed    I have performed a physical exam and reviewed and updated ROS and Plan today (11/30/2020). In review of yesterday's note (11/29/2020), there are no changes except as documented above.       Discussed patient condition and risk of morbidity and/or mortality with Family, RN, RT, Pharmacy, Code status disscussed, Charge nurse / hot rounds and Palliative care.  Critical care time = 40 minutes in directly providing and coordinating critical care and extensive data review.  No time overlap and excludes procedures.

## 2020-12-01 NOTE — RESPIRATORY CARE
Extubated and then reintubated pt w/ MD due to recurring cuff leak and was unable to pass sx catheter. New ETT is 8.0 secured @ 24 at the teeth. No complications.

## 2020-12-01 NOTE — PROGRESS NOTES
Critical Care Progress Note    Date of admission  11/12/2020    Chief Complaint  55 y.o. male admitted 11/12/2020 with COVID pneumonia and acute hypoxic respiratory failure.    Hospital Course  Mr. Costa is a 55 year old male with no past medical history who tested positive for COVID on 11/6/2020 and has progressive worsening of shortness of breath and now admitted to the ICU for acute hypoxic respiratory failure due to COVID pneumonia.  11/13 - remains on max HFNC with NRBM, remdesivir/decadron, starting lasix, intubated and proned/paralyzed  11/14 - full vent support, 16/70%, diuresing well with lasix, P/F ratio 178, compliance 35, cont proning protocol  11/15 - full vent support, 16/60%, diuresing well, P/F ratio 180, compliance 38, cont proning protocol tonight   11/16 - full vent support, 16/50%, diuresing well, P/F ratio at 210 with compliance of 38, hold proning tonight, stop paralytics  11/17 - full vent support, did not tolerate being off paralytics and holding proning-->p/f ratio 86, compliance of 39, PEEP 14 with FiO2 of 100%, proned/paralyzed  11/18 - full vent support, 14/70%, paralyzed/proned, p/f 111, compliance 44, stopped propofol and started versed  11/19 - full vent support, 14/60%, paralyzed/proned, p/f 137, compliance 47, versed  11/20 - full vent support, 14/60%, hold proning, continue paralytics, p/f 153, compliance 27  11/21 - full vent support, 12/100%, stop paralytics, cont heavy sedation, P/F 87, Compliance 27  11/23 -full vent support, sedation weaning, remains febrile  11/24 -awake and following, ongoing Vanco and cefepime, Levophed, full vent support  11/25 -full vent support on APRV, back on Levophed  11/27 -increasing need for full vent support, escalating vasopressors, new onset A. fib with RVR, GOC discussions  11/28 -full vent support with PEEP up to 17, Jordan-Synephrine drip, propofol/fentanyl  11/29 -full vent support, ongoing neodrip, tachypneic, completed antibiotics  11/30  -PEEP 10, 80%, intermittently following commands, Jordan-Synephrine to 80  12/1 - cuff leak and emergently re-intubated; vent day #19; PEEP 10, 100%    Interval Problem Update  Reviewed last 24 hour events:  Cuff leak today and emergently re-intubated  's, resume IV amio  No w/d, no followinig   Prop 80, fent  SAT -> dyschronus, high PAW  (followed yesterday with SAT, but desats)  Jordan 40 - 100  Tm = 101.8  moisés TF  I/O = 3.3/2.2  Lasix 20 BID  Full lovenox for AF, COVID  Midodrine  Stop Seroquel  Vent day 19: 35/440/10/100  7.27/85/52  CXR: inc bilat infs      Review of Systems  Review of Systems   Unable to perform ROS: Intubated        Vital Signs for last 24 hours   Temp:  [35.2 °C (95.4 °F)-38.8 °C (101.8 °F)] 35.2 °C (95.4 °F)  Pulse:  [] 92  Resp:  [31-45] 35  BP: ()/(51-78) 77/51  SpO2:  [79 %-94 %] 89 %    Respiratory Information for the last 24 hours  Vent Mode: APVCMV  Rate (breaths/min): 35  Vt Target (mL): 440  PEEP/CPAP: 10  MAP: 18  Control VTE (exp VT): 468     Physical Exam   Physical Exam  Vitals signs and nursing note reviewed.   Constitutional:       Appearance: He is overweight. He is ill-appearing.      Interventions: He is sedated and restrained.      Comments: Intubated, full ventilator support   HENT:      Head: Normocephalic and atraumatic.      Right Ear: External ear normal.      Left Ear: External ear normal.      Nose: Nose normal.      Comments: Nasal feeding tube in place     Mouth/Throat:      Mouth: Mucous membranes are dry.      Pharynx: Oropharynx is clear.      Comments: ETT in place  Eyes:      Conjunctiva/sclera: Conjunctivae normal.      Pupils: Pupils are equal, round, and reactive to light.   Neck:      Musculoskeletal: No muscular tenderness.      Comments: Left IJ central venous catheter without surrounding hematoma  Cardiovascular:      Rate and Rhythm: Regular rhythm. Tachycardia present. Occasional extrasystoles are present.     Chest Wall: PMI is not  displaced.      Pulses: Decreased pulses.           Radial pulses are 1+ on the right side and 1+ on the left side.      Heart sounds: Heart sounds are distant. No murmur.      Comments: Remains on vasopressor support  Pulmonary:      Breath sounds: No wheezing.      Comments: Intubated, diminished breath sounds  Abdominal:      General: Bowel sounds are normal.      Palpations: Abdomen is soft.   Genitourinary:     Comments: Sawyer catheter in place  Musculoskeletal:         General: No tenderness.      Right lower leg: Edema present.      Left lower leg: Edema present.   Skin:     General: Skin is warm and dry.      Capillary Refill: Capillary refill takes 2 to 3 seconds.      Coloration: Skin is pale.   Neurological:      Mental Status: He is lethargic.      Comments: Sedated, intermittently follows commands with SAT   Psychiatric:      Comments:  unable to assess given current clinical condition         Medications  Current Facility-Administered Medications   Medication Dose Route Frequency Provider Last Rate Last Admin   • QUEtiapine (Seroquel) tablet 50 mg  50 mg Enteral Tube Q8HRS Rai Yusuf M.D.   50 mg at 12/01/20 0529   • norepinephrine (Levophed) infusion 8 mg/250 mL (premix)  0-30 mcg/min Intravenous Continuous Rai Yusuf M.D.   Stopped at 11/30/20 1015   • amiodarone (Cordarone) tablet 400 mg  400 mg Enteral Tube TWICE DAILY Jeremy M Gonda, M.D.   400 mg at 12/01/20 0529   • phenylephrine (RAYO-SYNEPHRINE) 80 mg in  mL Infusion **NOTE CONCENTRATION**   0-300 mcg/min Intravenous Continuous Pablito Mills Jr., D.O. 18.8 mL/hr at 12/01/20 0926 100 mcg/min at 12/01/20 0926   • enoxaparin (LOVENOX) inj 100 mg  100 mg Subcutaneous BID Jeremy M Gonda, M.D.   100 mg at 12/01/20 0529   • midodrine (PROAMATINE) tablet 15 mg  15 mg Enteral Tube TID WITH MEALS Jeremy M Gonda, M.D.   15 mg at 11/30/20 1750   • lactulose 20 GM/30ML solution 30 mL  30 mL Enteral Tube TID PRN Jeremy M Gonda, M.D.       •  furosemide (LASIX) injection 20 mg  20 mg Intravenous Q12HRS Jeremy M Gonda, M.D.   20 mg at 12/01/20 0529   • acetaminophen (Tylenol) tablet 650 mg  650 mg Enteral Tube Q6HRS Jeremy M Gonda, M.D.   650 mg at 12/01/20 0529   • oxyCODONE immediate-release (ROXICODONE) tablet 5-10 mg  5-10 mg Enteral Tube Q4HRS PRN Jeremy M Gonda, M.D.   10 mg at 11/23/20 1128   • fentaNYL (SUBLIMAZE) injection 100 mcg  100 mcg Intravenous Q15 MIN PRN Jeremy M Gonda, M.D.   100 mcg at 11/26/20 2304    And   • fentaNYL (SUBLIMAZE) injection 200 mcg  200 mcg Intravenous Q15 MIN PRN Jeremy M Gonda, M.D.   200 mcg at 11/24/20 2015    And   • fentaNYL (SUBLIMAZE) 50 mcg/mL in 50mL (Continuous Infusion)   Intravenous Continuous Jeremy M Gonda, M.D. 6 mL/hr at 12/01/20 0506 300 mcg/hr at 12/01/20 0506   • propofol (DIPRIVAN) injection  0-80 mcg/kg/min Intravenous Continuous Jeremy M Gonda, M.D. 46.3 mL/hr at 12/01/20 0926 80 mcg/kg/min at 12/01/20 0926   • promethazine (PHENERGAN) tablet 12.5-25 mg  12.5-25 mg Enteral Tube Q4HRS PRN Patti Park M.D.       • Respiratory Therapy Consult   Nebulization Continuous RT Mahsa Reis M.D.       • ipratropium-albuterol (DUONEB) nebulizer solution  3 mL Nebulization Q2HRS PRN (RT) Mahsa Reis M.D.       • famotidine (PEPCID) tablet 20 mg  20 mg Enteral Tube Q12HRS Mahsa Reis M.D.   20 mg at 12/01/20 0529    Or   • famotidine (PEPCID) injection 20 mg  20 mg Intravenous Q12HRS Mahsa Reis M.D.   20 mg at 11/26/20 1738   • Pharmacy Consult: Enteral tube insertion - review meds/change route/product selection  1 Each Other PHARMACY TO DOSE Mahsa Reis M.D.       • senna-docusate (PERICOLACE or SENOKOT S) 8.6-50 MG per tablet 2 Tab  2 Tab Enteral Tube BID Mahsa Reis M.D.   2 Tab at 12/01/20 0600    And   • polyethylene glycol/lytes (MIRALAX) PACKET 1 Packet  1 Packet Enteral Tube QDAY PRN Mahsa Reis M.D.   1 Packet at 11/30/20 1751    And   • magnesium hydroxide (MILK OF  MAGNESIA) suspension 30 mL  30 mL Enteral Tube QDAY PRN Mahsa Reis M.D.   30 mL at 11/30/20 1751    And   • bisacodyl (DULCOLAX) suppository 10 mg  10 mg Rectal QDAY PRN Mahsa Reis M.D.   10 mg at 11/28/20 1654   • ondansetron (ZOFRAN ODT) dispertab 4 mg  4 mg Enteral Tube Q6HRS PRN Mahsa Reis M.D.       • promethazine (PHENERGAN) suppository 12.5-25 mg  12.5-25 mg Rectal Q4HRS PRN Pablito Mills Jr., D.O.       • prochlorperazine (COMPAZINE) injection 5-10 mg  5-10 mg Intravenous Q4HRS PRN Pablito Mills Jr., D.O.       • ondansetron (ZOFRAN) syringe/vial injection 4 mg  4 mg Intravenous Q6HRS PRN Pablito Mills Jr., D.O.       • MD Alert...ICU Electrolyte Replacement per Pharmacy   Other PHARMACY TO DOSE Adalberto Galeana M.D.           Fluids    Intake/Output Summary (Last 24 hours) at 12/1/2020 0947  Last data filed at 12/1/2020 0800  Gross per 24 hour   Intake 3488.58 ml   Output 2050 ml   Net 1438.58 ml       Laboratory  Recent Labs     11/29/20  0205 11/30/20  0346   ISTATAPH 7.412 7.381*   ISTATAPCO2 60.8* 64.0*   ISTATAPO2 47* 54*   ISTATATCO2 40* 40*   EZCYKEX6DWM 81* 86*   ISTATARTHCO3 38.7* 37.9*   ISTATARTBE 12* 11*   ISTATTEMP 97.1 F 98.4 F   ISTATFIO2 80 80   ISTATSPEC Arterial Arterial   ISTATAPHTC 7.424 7.382*   IUZHPFMC2VX 44* 54*         Recent Labs     11/29/20  0350 11/30/20  0338 12/01/20  0510   SODIUM 135 135 134*   POTASSIUM 4.0 3.8 4.0   CHLORIDE 94* 95* 93*   CO2 36* 37* 39*   BUN 19 18 17   CREATININE 0.28* 0.29* 0.21*   MAGNESIUM 2.0 1.9 2.2   PHOSPHORUS 1.9* 2.4* 2.7   CALCIUM 8.2* 8.2* 8.6     Recent Labs     11/29/20 0350 11/30/20 0338 11/30/20  1845 12/01/20  0510   PREALBUMIN  --   --  7.7*  --    GLUCOSE 136* 117*  --  138*     Recent Labs     11/29/20 0350 11/30/20 0338 12/01/20  0510   WBC 10.8 10.3 11.4*   NEUTSPOLYS 77.70* 64.90 55.30   LYMPHOCYTES 8.10* 14.90* 8.80*   MONOCYTES 3.10 1.80 1.80   EOSINOPHILS 7.70* 14.00* 5.30   BASOPHILS 0.50 0.90 0.00      Recent Labs     11/29/20  0350 11/30/20  0338 12/01/20  0510   RBC 3.69* 3.59* 3.77*   HEMOGLOBIN 10.7* 10.5* 11.3*   HEMATOCRIT 35.2* 34.1* 36.9*   PLATELETCT 274 251 227       Imaging  Chest x-ray personally reviewed and compared to prior film showing worsening diffuse bilateral infiltrates, endotracheal tube/gastric tube/left IJ central venous catheter in good position    Assessment/Plan  * Acute respiratory failure with hypoxia (HCC)- (present on admission)  Assessment & Plan  Intubated on 11/13 (ventilator day 17)  Continue full vent support, keep PEEP at 17, continue high mandatory minute ventilation  Attempt to titrate FiO2 to goal SaO2 greater than 85%  RT/O2 protocol  Continue sedation, analgesia without wean today given degree of suffering  Continue Lasix    Atrial fibrillation with RVR ([facility])  Assessment & Plan  New onset with associated shock 11/27 --> converted back to normal sinus rhythm 11/28  S/p unsuccessful electrical cardioversion x3  S/p IV digoxin load  S/p IV amiodarone boluses followed by drip --> transition drip to oral today  Optimize electrolytes  Continue therapeutic anticoagulation    Bacterial pneumonia  Assessment & Plan  Likely superinfection, 11/23 cultures negative  Continue cefepime x7 days (day 7 of 7)    Shock (HCC)  Assessment & Plan  Continue to titrate phenylephrine drip to maintain MAP greater than 60   Continue high dose of oral midodrine  Monitor blood pressure closely    Acute respiratory distress syndrome (ARDS) due to COVID-19 virus ([facility])  Assessment & Plan  In the severe fibrotic terminal stage of COVID-19 unfortunately  Continue lung protective ventilation strategies  Monitor daily P:F and compliance  Continue forced diuresis     COVID-19 virus infection- (present on admission)  Assessment & Plan  Continue with the following therapies while monitoring closely:  Decadron: Completed 10-day course 11/21  Remdesivir: Completed 5-day course  Diuresis: Continue Lasix twice  daily  Antitussives, supportive care, monitoring inflammatory markers and LFTs  Maintain strict isolation precautions  S/p 72 hours of proning protocols twice  Sedation as needed    Acute renal failure with tubular necrosis (HCC)  Assessment & Plan  Improved  Avoid nephrotoxins  Monitor creatinine, urine output, electrolytes closely  Continue diuresis with current dose    Slow transit constipation  Assessment & Plan  Initially improved after Relistor and bowel protocol, now recurring -->  Increase aggressiveness of bowel protocol  Aspiration precautions    Transaminitis- (present on admission)  Assessment & Plan  Likely 2/2 COVID-19  Avoid hepatotoxins  Monitor synthetic function    Hypophosphatemia  Assessment & Plan  Repletion and monitor closely    Hyponatremia  Assessment & Plan  Improved  Monitor with daily serum sodium level    Hypomagnesemia  Assessment & Plan  Repleted    Hypokalemia- (present on admission)  Assessment & Plan  Repleted    Updated plan:  Continue full ventilator support,  Re-intubated for cuff leak, dislodged ETT  diuresis  Overall prognosis very poor  Palliative care following and assisting    VTE:  Lovenox  Ulcer: H2 Antagonist  Lines: Central Line  Ongoing indication addressed and Sawyer Catheter  Ongoing indication addressed    I have performed a physical exam and reviewed and updated ROS and Plan today (12/1/2020). In review of yesterday's note (11/30/2020), there are no changes except as documented above.       Discussed patient condition and risk of morbidity and/or mortality with Family, RN, RT, Pharmacy, Code status disscussed, Charge nurse / hot rounds and Palliative care.  Critical care time = 35 minutes in directly providing and coordinating critical care and extensive data review.  No time overlap and excludes procedures.

## 2020-12-02 NOTE — PROGRESS NOTES
Critical Care Progress Note    Date of admission  11/12/2020    Chief Complaint  55 y.o. male admitted 11/12/2020 with COVID pneumonia and acute hypoxic respiratory failure.    Hospital Course  Mr. Costa is a 55 year old male with no past medical history who tested positive for COVID on 11/6/2020 and has progressive worsening of shortness of breath and now admitted to the ICU for acute hypoxic respiratory failure due to COVID pneumonia.  11/13 - remains on max HFNC with NRBM, remdesivir/decadron, starting lasix, intubated and proned/paralyzed  11/14 - full vent support, 16/70%, diuresing well with lasix, P/F ratio 178, compliance 35, cont proning protocol  11/15 - full vent support, 16/60%, diuresing well, P/F ratio 180, compliance 38, cont proning protocol tonight   11/16 - full vent support, 16/50%, diuresing well, P/F ratio at 210 with compliance of 38, hold proning tonight, stop paralytics  11/17 - full vent support, did not tolerate being off paralytics and holding proning-->p/f ratio 86, compliance of 39, PEEP 14 with FiO2 of 100%, proned/paralyzed  11/18 - full vent support, 14/70%, paralyzed/proned, p/f 111, compliance 44, stopped propofol and started versed  11/19 - full vent support, 14/60%, paralyzed/proned, p/f 137, compliance 47, versed  11/20 - full vent support, 14/60%, hold proning, continue paralytics, p/f 153, compliance 27  11/21 - full vent support, 12/100%, stop paralytics, cont heavy sedation, P/F 87, Compliance 27  11/23 -full vent support, sedation weaning, remains febrile  11/24 -awake and following, ongoing Vanco and cefepime, Levophed, full vent support  11/25 -full vent support on APRV, back on Levophed  11/27 -increasing need for full vent support, escalating vasopressors, new onset A. fib with RVR, GOC discussions  11/28 -full vent support with PEEP up to 17, Jordan-Synephrine drip, propofol/fentanyl  11/29 -full vent support, ongoing neodrip, tachypneic, completed antibiotics  11/30  -PEEP 10, 80%, intermittently following commands, Jordan-Synephrine to 80  12/1 - cuff leak and emergently re-intubated; vent day #19; PEEP 10, 100%  12/2 -maximal support, now worsening ABG, unable to effectively oxygenate or ventilate    Interval Problem Update  Reviewed last 24 hour events:  SR/ST  amio gtt day 2  SBP a  Afebrile  Heavily sedated for vent synchrony  Not following  I/O =   Vent day #20; 36/350/10/100  AB.2/98/41  CXR:   Full lovenox; AF, Covid  Lasix BID  Midodrine  Jordan synephrine 30, off norepi  CR up today  F/u TG    Review of Systems  Review of Systems   Unable to perform ROS: Intubated        Vital Signs for last 24 hours   Pulse:  [] 102  Resp:  [29-39] 34  BP: ()/(51-80) 122/74  SpO2:  [81 %-90 %] 84 %    Respiratory Information for the last 24 hours  Vent Mode: APVCMV  Rate (breaths/min): 36  Vt Target (mL): 350  PEEP/CPAP: 10  MAP: 18  Control VTE (exp VT): 379     Physical Exam   Physical Exam  Vitals signs and nursing note reviewed.   Constitutional:       Appearance: He is overweight. He is ill-appearing.      Interventions: He is sedated and restrained.      Comments: Intubated, full ventilator support   HENT:      Head: Normocephalic and atraumatic.      Right Ear: External ear normal.      Left Ear: External ear normal.      Nose: Nose normal.      Comments: Nasal feeding tube in place     Mouth/Throat:      Mouth: Mucous membranes are dry.      Pharynx: Oropharynx is clear.      Comments: ETT in place  Eyes:      Conjunctiva/sclera: Conjunctivae normal.      Pupils: Pupils are equal, round, and reactive to light.   Neck:      Musculoskeletal: No muscular tenderness.      Comments: Left IJ central venous catheter without surrounding hematoma  Cardiovascular:      Rate and Rhythm: Regular rhythm. Tachycardia present. Occasional extrasystoles are present.     Chest Wall: PMI is not displaced.      Pulses: Decreased pulses.           Radial pulses are 1+ on the right side  and 1+ on the left side.      Heart sounds: Heart sounds are distant. No murmur.      Comments: Remains on vasopressor support  Pulmonary:      Breath sounds: No wheezing.      Comments: Intubated, diminished breath sounds  Abdominal:      General: Bowel sounds are normal.      Palpations: Abdomen is soft.   Genitourinary:     Comments: Sawyer catheter in place  Musculoskeletal:         General: No tenderness.      Right lower leg: Edema present.      Left lower leg: Edema present.   Skin:     General: Skin is warm and dry.      Capillary Refill: Capillary refill takes 2 to 3 seconds.      Coloration: Skin is pale.   Neurological:      Mental Status: He is lethargic.      Comments: Heavily sedated, unresponsive currently   Psychiatric:      Comments:  unable to assess given current clinical condition         Medications  Current Facility-Administered Medications   Medication Dose Route Frequency Provider Last Rate Last Admin   • amiodarone (NEXTERONE) 360 mg/200 mL infusion  0.5-1 mg/min Intravenous Continuous Rai Yusuf M.D. 17 mL/hr at 12/02/20 0448 0.5 mg/min at 12/02/20 0448   • norepinephrine (Levophed) infusion 8 mg/250 mL (premix)  0-30 mcg/min Intravenous Continuous Rai Yusuf M.D.   Stopped at 11/30/20 1015   • amiodarone (Cordarone) tablet 400 mg  400 mg Enteral Tube TWICE DAILY Jeremy M Gonda, M.D.   Stopped at 12/02/20 0600   • phenylephrine (RAYO-SYNEPHRINE) 80 mg in  mL Infusion **NOTE CONCENTRATION**   0-300 mcg/min Intravenous Continuous Pablito Mills Jr., D.O. 7.5 mL/hr at 12/01/20 1812 40 mcg/min at 12/01/20 1812   • enoxaparin (LOVENOX) inj 100 mg  100 mg Subcutaneous BID Jeremy M Gonda, M.D.   100 mg at 12/02/20 0516   • midodrine (PROAMATINE) tablet 15 mg  15 mg Enteral Tube TID WITH MEALS Jeremy M Gonda, M.D.   15 mg at 12/02/20 0614   • lactulose 20 GM/30ML solution 30 mL  30 mL Enteral Tube TID PRN Jeremy M Gonda, M.D.   30 mL at 12/01/20 1751   • furosemide (LASIX) injection  20 mg  20 mg Intravenous Q12HRS Jeremy M Gonda, M.D.   20 mg at 12/02/20 0516   • acetaminophen (Tylenol) tablet 650 mg  650 mg Enteral Tube Q6HRS Jeremy M Gonda, M.D.   Stopped at 12/02/20 0014   • oxyCODONE immediate-release (ROXICODONE) tablet 5-10 mg  5-10 mg Enteral Tube Q4HRS PRN Jeremy M Gonda, M.D.   10 mg at 11/23/20 1128   • fentaNYL (SUBLIMAZE) injection 100 mcg  100 mcg Intravenous Q15 MIN PRN Jeremy M Gonda, M.D.   100 mcg at 11/26/20 2304    And   • fentaNYL (SUBLIMAZE) injection 200 mcg  200 mcg Intravenous Q15 MIN PRN Jeremy M Gonda, M.D.   200 mcg at 11/24/20 2015    And   • fentaNYL (SUBLIMAZE) 50 mcg/mL in 50mL (Continuous Infusion)   Intravenous Continuous Jeremy M Gonda, M.D. 6 mL/hr at 12/01/20 2211 New Bag at 12/02/20 0640   • propofol (DIPRIVAN) injection  0-80 mcg/kg/min Intravenous Continuous Jeremy M Gonda, M.D. 46.3 mL/hr at 12/02/20 0613 80 mcg/kg/min at 12/02/20 0613   • promethazine (PHENERGAN) tablet 12.5-25 mg  12.5-25 mg Enteral Tube Q4HRS PRN Patti Park M.D.       • Respiratory Therapy Consult   Nebulization Continuous RT Mahsa Reis M.D.       • ipratropium-albuterol (DUONEB) nebulizer solution  3 mL Nebulization Q2HRS PRN (RT) Mahsa Reis M.D.       • famotidine (PEPCID) tablet 20 mg  20 mg Enteral Tube Q12HRS Mahsa Reis M.D.   20 mg at 12/01/20 1750    Or   • famotidine (PEPCID) injection 20 mg  20 mg Intravenous Q12HRS Mahsa Reis M.D.   20 mg at 12/02/20 0516   • Pharmacy Consult: Enteral tube insertion - review meds/change route/product selection  1 Each Other PHARMACY TO DOSE Mahsa E Smiley, M.D.       • senna-docusate (PERICOLACE or SENOKOT S) 8.6-50 MG per tablet 2 Tab  2 Tab Enteral Tube BID Mahsa Reis M.D.   2 Tab at 12/02/20 0515    And   • polyethylene glycol/lytes (MIRALAX) PACKET 1 Packet  1 Packet Enteral Tube QDAY PRN Mahsa Reis M.D.   1 Packet at 12/01/20 1750    And   • magnesium hydroxide (MILK OF MAGNESIA) suspension 30 mL  30  mL Enteral Tube QDAY PRN Mahsa Reis M.D.   30 mL at 11/30/20 1751    And   • bisacodyl (DULCOLAX) suppository 10 mg  10 mg Rectal QDAY PRN Mahsa Reis M.D.   10 mg at 11/28/20 1654   • ondansetron (ZOFRAN ODT) dispertab 4 mg  4 mg Enteral Tube Q6HRS PRN Mahsa Reis M.D.       • promethazine (PHENERGAN) suppository 12.5-25 mg  12.5-25 mg Rectal Q4HRS PRN Pablito Mills Jr., D.O.       • prochlorperazine (COMPAZINE) injection 5-10 mg  5-10 mg Intravenous Q4HRS PRN Pablito Mills Jr., D.O.       • ondansetron (ZOFRAN) syringe/vial injection 4 mg  4 mg Intravenous Q6HRS PRN Pablito Mills Jr., D.O.       • MD Alert...ICU Electrolyte Replacement per Pharmacy   Other PHARMACY TO DOSE Adalberto Galeana M.D.           Fluids    Intake/Output Summary (Last 24 hours) at 12/2/2020 0828  Last data filed at 12/2/2020 0800  Gross per 24 hour   Intake 2615.82 ml   Output 1520 ml   Net 1095.82 ml       Laboratory  Recent Labs     12/01/20  1026 12/02/20  0121 12/02/20  0134   ISTATAPH 7.263* 7.199* 7.187*   ISTATAPCO2 89.9* >100.0* >100.0*   ISTATAPO2 56* 45* 44*   ISTATATCO2 43* 45* 42*   VKNRDEH2BPY 81* 65* 65*   ISTATARTHCO3 40.7* 41.4* 39.2*   ISTATARTBE 11* 10* 7*   ISTATTEMP 36.0 C 35.8 C 35.8 C   ISTATFIO2 100 100 100   ISTATSPEC Arterial Arterial Arterial   ISTATAPHTC 7.277* 7.215* 7.203*   SVPMOTES6IP 52* 41* 41*         Recent Labs     11/30/20  0338 12/01/20  0510 12/02/20  0530   SODIUM 135 134* 132*   POTASSIUM 3.8 4.0 4.8   CHLORIDE 95* 93* 89*   CO2 37* 39* 37*   BUN 18 17 31*   CREATININE 0.29* 0.21* 0.69   MAGNESIUM 1.9 2.2 2.5   PHOSPHORUS 2.4* 2.7 4.5   CALCIUM 8.2* 8.6 9.0     Recent Labs     11/30/20  0338 11/30/20  1845 12/01/20  0510 12/02/20  0530   PREALBUMIN  --  7.7*  --   --    GLUCOSE 117*  --  138* 142*     Recent Labs     11/30/20 0338 12/01/20  0510 12/02/20  0530   WBC 10.3 11.4* 13.0*   NEUTSPOLYS 64.90 55.30 66.10   LYMPHOCYTES 14.90* 8.80* 8.70*   MONOCYTES 1.80 1.80 2.60    EOSINOPHILS 14.00* 5.30 1.70   BASOPHILS 0.90 0.00 0.00     Recent Labs     11/30/20  0338 12/01/20  0510 12/02/20  0530   RBC 3.59* 3.77* 3.65*   HEMOGLOBIN 10.5* 11.3* 10.6*   HEMATOCRIT 34.1* 36.9* 36.4*   PLATELETCT 251 227 309       Imaging  Chest x-ray personally reviewed and compared to prior film showing worsening diffuse bilateral infiltrates, endotracheal tube/gastric tube/left IJ central venous catheter in good position    Assessment/Plan  * Acute respiratory failure with hypoxia (HCC)- (present on admission)  Assessment & Plan  Intubated on 11/13 (ventilator day 17)  Continue full vent support, keep PEEP at 17, continue high mandatory minute ventilation  Attempt to titrate FiO2 to goal SaO2 greater than 85%  RT/O2 protocol  Continue sedation, analgesia without wean today given degree of suffering  Continue Lasix    Atrial fibrillation with RVR (HCC)  Assessment & Plan  New onset with associated shock 11/27 --> converted back to normal sinus rhythm 11/28  S/p unsuccessful electrical cardioversion x3  S/p IV digoxin load  S/p IV amiodarone boluses followed by drip --> transition drip to oral today  Optimize electrolytes  Continue therapeutic anticoagulation    Bacterial pneumonia  Assessment & Plan  Likely superinfection, 11/23 cultures negative  Continue cefepime x7 days (day 7 of 7)    Shock (HCC)  Assessment & Plan  Continue to titrate phenylephrine drip to maintain MAP greater than 60   Continue high dose of oral midodrine  Monitor blood pressure closely    Acute respiratory distress syndrome (ARDS) due to COVID-19 virus (HCC)  Assessment & Plan  In the severe fibrotic terminal stage of COVID-19 unfortunately  Continue lung protective ventilation strategies  Monitor daily P:F and compliance  Continue forced diuresis     COVID-19 virus infection- (present on admission)  Assessment & Plan  Continue with the following therapies while monitoring closely:  Decadron: Completed 10-day course  11/21  Remdesivir: Completed 5-day course  Diuresis: Continue Lasix twice daily  Antitussives, supportive care, monitoring inflammatory markers and LFTs  Maintain strict isolation precautions  S/p 72 hours of proning protocols twice  Sedation as needed    Acute renal failure with tubular necrosis (HCC)  Assessment & Plan  Improved  Avoid nephrotoxins  Monitor creatinine, urine output, electrolytes closely  Continue diuresis with current dose    Slow transit constipation  Assessment & Plan  Initially improved after Relistor and bowel protocol, now recurring -->  Increase aggressiveness of bowel protocol  Aspiration precautions    Transaminitis- (present on admission)  Assessment & Plan  Likely 2/2 COVID-19  Avoid hepatotoxins  Monitor synthetic function    Hypophosphatemia  Assessment & Plan  Repletion and monitor closely    Hyponatremia  Assessment & Plan  Improved  Monitor with daily serum sodium level    Hypomagnesemia  Assessment & Plan  Repleted    Hypokalemia- (present on admission)  Assessment & Plan  Repleted    Updated plan:  High ventilator support; difficult to oxygenate or ventilate at this point  Mortality extremely high, family has been updated  Continue supportive care    VTE:  Lovenox  Ulcer: H2 Antagonist  Lines: Central Line  Ongoing indication addressed and Sawyer Catheter  Ongoing indication addressed    I have performed a physical exam and reviewed and updated ROS and Plan today (12/2/2020). In review of yesterday's note (12/1/2020), there are no changes except as documented above.       Discussed patient condition and risk of morbidity and/or mortality with Family, RN, RT, Pharmacy, Code status disscussed, Charge nurse / hot rounds and Palliative care.  Critical care time = 48 minutes in directly providing and coordinating critical care and extensive data review.  No time overlap and excludes procedures.

## 2020-12-03 NOTE — DISCHARGE PLANNING
Medical Social Work     SW received a call from the RN advising SW that the pt . The RN advised SW that the pt family put the pt on comfort care and are aware of the pt medical status. The RN requested SW called the pts family and provide them with the next steps. SW called and offered condolences to the pt wife Traci and family. SW provided emotional support and advised the family of the next steps.     Plan; SW will remain available for family support.

## 2020-12-03 NOTE — CARE PLAN
No acute events over the shift. Remains on sedation prop and fent. Unable to wean on 100% o2 and saturation remains in 70ds. Remains on low dose of pressors. Sinus tach. tube feeds remain at goal. Urine out put was inadequate. No bm. Getting family on board with comfort

## 2020-12-03 NOTE — DISCHARGE SUMMARY
Death Summary    Cause of Death  ARDS due to pneumonia due to SARS-CoV-2.    Comorbid Conditions at the Time of Death  Principal Problem:    Acute respiratory failure with hypoxia (HCC) POA: Yes  Active Problems:    COVID-19 virus infection POA: Yes    Acute respiratory distress syndrome (ARDS) due to COVID-19 virus (HCC) POA: Unknown    Shock (HCC) POA: No    Bacterial pneumonia POA: Unknown    Atrial fibrillation with RVR (HCC) POA: Unknown    Transaminitis POA: Yes    Slow transit constipation POA: Unknown    Acute renal failure with tubular necrosis (HCC) POA: Unknown    Hypokalemia POA: Yes    Hypomagnesemia POA: Unknown    Hyponatremia POA: Unknown    Hypophosphatemia POA: Unknown  Resolved Problems:    DNR (do not resuscitate) POA: Yes      History of Presenting Illness and Hospital Course  This is a 55 y.o. male admitted 2020 with respiratory failure, pneumonia and ARDS due to SARS-CoV-2.  He ultimately required intubation and full mechanical ventilatory support.  He was sedated and paralyzed and proning protocol was initiated.  He was treated aggressively and appropriately, however developed refractory shock and ultimately .  The interested reader is referred to the progress notes for additional details.    Alonso Dias MD  Pulmonary and Critical Care Medicine

## 2020-12-03 NOTE — PROGRESS NOTES
I was called for imminent arrest.  This patient is here with COVID pneumonia and very severe ARDS with extremely poor compliance.  He is on vent day 19 and already had a week-long treatment with proning.  Has has received all the appropriate COVID meds. On arrival patient was bradycardic in the 40s and sats in the 40s. I gave atropine and bagged the patient.  I also started levophed and epi drips. With bagging we were only able to achieve an SaO2 of 55%.  We switched back to the ventilator and I liberalized the tidal volume and increased PEEP to improve oxygenation at the expense of his plateau pressures.  His PCO2 this am was >100 so he is not a candidate for APRV.  Settle on Vt of 400 and PEEP of 18, resulting in plateau pressures of 60s.  With the bradycardia that was treated with atropine, I d/c'ed the propofol and will use versed pushes in it's place to maintain RASS -4 to -5. ubcostal view showed mild RV free wall dysfunction but grossly normal appearing TAPSE, preserved LV function, dilated and non-collapsing IVC.     I updated the patient's wife that he will likely die tonight.  She requested we do everything we can to keep him alive.  I will continue with full ventilator support to optimize his oxygenation as able and vasoactive medications to support BP and heart rate.  At this late stage of his severe respiratory illness chest compressions would be non-beneficial and would expose staff to aerosolizing of COVID virus, so I advised the nurse to push epi if we lose a pulse but not to perform chest compressions.  I did not discuss chest compressions with his wife.  Because chest compressions would be non-beneficial, there is simply no indication to perform them.     The patient remains critically ill on the ventilator and pressors, will likely die tonight.  Critical care time = 30 minutes in directly providing and coordinating critical care and extensive data review.  No time overlap and excludes  procedures.

## 2020-12-03 NOTE — PROGRESS NOTES
All belongings inventoried and placed in double red bag.  Will be taken to morgue with the patient.  Family is not at bedside

## 2020-12-03 NOTE — PROGRESS NOTES
Patient continues to need vasopressor for support.  Was able to titrate Levophed down to 8 mcg but had to increase within 10 minutes, and dose currently at 20mcg.  Remains off Fentanyl and Propofol.Epi infusing at 5mcg, will titrate up/down on Levophed first per physician.  Patient remains RASS -5.  Family called by this RN to give option to visit patient due to grave status

## 2020-12-03 NOTE — DISCHARGE PLANNING
Received call from family member Brie stating they've chosen mortuary and wanting to know next steps. Provided Brie w/ [facility] phone number for assistance w/ next steps [phone] and emailed Brie copy of Difficult Time packet: [email or web address]

## 2020-12-03 NOTE — PROGRESS NOTES
1955 patient began desatting to the 40s, SBP in 50s    1959 Dr. Reis and RT at bedside      2000 0.5mg of epi given and propofol and fentanyl gtt stopped see MAR    2003 RT began bagging patient    2004 0.5 atropine given    Norepi and epi gtt added per annika Arias and titrated per MD request    Family updated    2012 patient placed back on vent    2018 patient given 5 of versed per MAR